# Patient Record
Sex: FEMALE | Race: WHITE | Employment: OTHER | URBAN - METROPOLITAN AREA
[De-identification: names, ages, dates, MRNs, and addresses within clinical notes are randomized per-mention and may not be internally consistent; named-entity substitution may affect disease eponyms.]

---

## 2017-01-09 PROBLEM — I73.9 PAD (PERIPHERAL ARTERY DISEASE) (HCC): Status: ACTIVE | Noted: 2017-01-09

## 2017-02-14 ENCOUNTER — TELEPHONE (OUTPATIENT)
Dept: INTERNAL MEDICINE CLINIC | Facility: CLINIC | Age: 82
End: 2017-02-14

## 2017-02-14 NOTE — TELEPHONE ENCOUNTER
Pt called for refills and when I checked with nurse when checking chart it looked like    Pt may have refills at pharmacy - pt was advised to call pharmacy to see if she had refills left, since she had not yet done that     But Pt was upset with me and hun

## 2017-02-20 ENCOUNTER — TELEPHONE (OUTPATIENT)
Dept: INTERNAL MEDICINE CLINIC | Facility: CLINIC | Age: 82
End: 2017-02-20

## 2017-02-20 DIAGNOSIS — L29.9 ITCHING: Primary | ICD-10-CM

## 2017-02-20 NOTE — TELEPHONE ENCOUNTER
Pt states she uses [facility] [employer] order. Other refills were sent to local pharm. Pt takes Vit D 2000 units. I informed pt that she can pick this up otc. Pt verbalized understanding. Pt states she is taking Hydroxyzine 25mg, 2-3 tablets BID prn.      To

## 2017-02-20 NOTE — TELEPHONE ENCOUNTER
Pt. Is requesting refills she needs new Rx's Cilostazol 100 mg, Hydroxyzine 25 mg pt. Wants it increased because she is still itching, Vitamin D, Simvastatin 10 mg, Temazepam 15 mg,   Ph. # [phone]     Pt.  Want's Rx mailed to her house (address [facility]

## 2017-02-21 RX ORDER — CILOSTAZOL 100 MG/1
100 TABLET ORAL 2 TIMES DAILY
Qty: 180 TABLET | Refills: 3 | Status: SHIPPED | OUTPATIENT
Start: 2017-02-21 | End: 2017-02-22

## 2017-02-21 RX ORDER — TEMAZEPAM 15 MG/1
15 CAPSULE ORAL NIGHTLY PRN
Refills: 0
Start: 2017-02-21

## 2017-02-21 RX ORDER — SIMVASTATIN 10 MG
10 TABLET ORAL NIGHTLY
Qty: 90 TABLET | Refills: 3 | Status: SHIPPED | OUTPATIENT
Start: 2017-02-21 | End: 2017-02-22

## 2017-02-21 RX ORDER — HYDROXYZINE HYDROCHLORIDE 25 MG/1
25 TABLET, FILM COATED ORAL 3 TIMES DAILY PRN
Qty: 30 TABLET | Refills: 3 | Status: SHIPPED | OUTPATIENT
Start: 2017-02-21 | End: 2017-02-22

## 2017-02-21 NOTE — TELEPHONE ENCOUNTER
Pt. Called back stating she received a call from her local [facility] telling her she had scripts that were ready for . She wanted to be sure we knew that her scripts should go to [facility] mail order.   She is going to call her local [facility] and find out what they

## 2017-02-22 ENCOUNTER — OFFICE VISIT (OUTPATIENT)
Dept: INTERNAL MEDICINE CLINIC | Facility: CLINIC | Age: 82
End: 2017-02-22

## 2017-02-22 VITALS
DIASTOLIC BLOOD PRESSURE: 72 MMHG | TEMPERATURE: 97 F | OXYGEN SATURATION: 93 % | SYSTOLIC BLOOD PRESSURE: 108 MMHG | BODY MASS INDEX: 22.63 KG/M2 | HEART RATE: 100 BPM | WEIGHT: 123 LBS | HEIGHT: 62 IN

## 2017-02-22 DIAGNOSIS — F51.01 PRIMARY INSOMNIA: ICD-10-CM

## 2017-02-22 DIAGNOSIS — I73.9 PERIPHERAL ARTERIAL DISEASE (HCC): ICD-10-CM

## 2017-02-22 DIAGNOSIS — E55.9 VITAMIN D DEFICIENCY: ICD-10-CM

## 2017-02-22 DIAGNOSIS — L29.9 ITCHING: Primary | ICD-10-CM

## 2017-02-22 PROCEDURE — 99213 OFFICE O/P EST LOW 20 MIN: CPT | Performed by: INTERNAL MEDICINE

## 2017-02-22 PROCEDURE — G0463 HOSPITAL OUTPT CLINIC VISIT: HCPCS | Performed by: INTERNAL MEDICINE

## 2017-02-22 RX ORDER — TEMAZEPAM 15 MG/1
15 CAPSULE ORAL NIGHTLY PRN
Qty: 30 CAPSULE | Refills: 3 | Status: ON HOLD
Start: 2017-02-22 | End: 2017-03-03

## 2017-02-22 RX ORDER — CILOSTAZOL 100 MG/1
100 TABLET ORAL 2 TIMES DAILY
Qty: 120 TABLET | Refills: 3 | Status: SHIPPED | OUTPATIENT
Start: 2017-02-22 | End: 2018-03-02

## 2017-02-22 RX ORDER — MULTIVIT-MIN/IRON/FOLIC ACID/K 18-600-40
1 CAPSULE ORAL DAILY
COMMUNITY
End: 2017-02-22

## 2017-02-22 RX ORDER — MULTIVIT-MIN/IRON/FOLIC ACID/K 18-600-40
1 CAPSULE ORAL DAILY
Qty: 90 CAPSULE | Refills: 3 | Status: SHIPPED | OUTPATIENT
Start: 2017-02-22 | End: 2018-05-09 | Stop reason: DRUGHIGH

## 2017-02-22 RX ORDER — SIMVASTATIN 10 MG
10 TABLET ORAL NIGHTLY
Qty: 90 TABLET | Refills: 3 | Status: SHIPPED | OUTPATIENT
Start: 2017-02-22 | End: 2018-02-16

## 2017-02-22 RX ORDER — HYDROXYZINE HYDROCHLORIDE 25 MG/1
25 TABLET, FILM COATED ORAL 3 TIMES DAILY PRN
Qty: 30 TABLET | Refills: 3 | Status: SHIPPED | OUTPATIENT
Start: 2017-02-22 | End: 2018-12-05

## 2017-02-22 NOTE — PROGRESS NOTES
Kristen Figueroa is a 80year old female. Patient presents with:  Medication Request  Urinary: Foul odor       HPI:   Kristen Figueroa is a 80year old female who presents for: a follow up visit    Reports feeling well.  Does report a foul odor to her urin Comment L2-S1 decompressive laminectomy with bilat foraminotomies.  Dr. Ulysees Mcardle      Family History   Problem Relation Age of Onset   • Heart Disorder Father    • Other[Other] [OTHER] Mother      colon cancer   • Cancer Mother      breast c tablet; Refill: 3    3. Vitamin D deficiency  Vitamin D supplementation encouraged. Encouraged PT  - Cholecalciferol (VITAMIN D) 2000 units Oral Cap; Take 1 capsule (2,000 Units total) by mouth daily. Dispense: 90 capsule; Refill: 3    4.  Primary insomnia

## 2017-02-23 ENCOUNTER — LAB ENCOUNTER (OUTPATIENT)
Dept: LAB | Facility: HOSPITAL | Age: 82
End: 2017-02-23
Attending: INTERNAL MEDICINE
Payer: MEDICARE

## 2017-02-23 DIAGNOSIS — R35.0 URINE FREQUENCY: Primary | ICD-10-CM

## 2017-02-23 LAB
BILIRUB UR QL: NEGATIVE
COLOR UR: YELLOW
GLUCOSE UR-MCNC: NEGATIVE MG/DL
HGB UR QL STRIP.AUTO: NEGATIVE
NITRITE UR QL STRIP.AUTO: NEGATIVE
PH UR: 5 [PH] (ref 5–8)
PROT UR-MCNC: 30 MG/DL
RBC #/AREA URNS AUTO: 17 /HPF
SP GR UR STRIP: 1.02 (ref 1–1.03)
UROBILINOGEN UR STRIP-ACNC: <2
VIT C UR-MCNC: 40 MG/DL
WBC #/AREA URNS AUTO: 339 /HPF

## 2017-02-23 PROCEDURE — 87077 CULTURE AEROBIC IDENTIFY: CPT

## 2017-02-23 PROCEDURE — 87186 SC STD MICRODIL/AGAR DIL: CPT

## 2017-02-23 PROCEDURE — 81001 URINALYSIS AUTO W/SCOPE: CPT

## 2017-02-23 PROCEDURE — 87086 URINE CULTURE/COLONY COUNT: CPT

## 2017-02-23 RX ORDER — NITROFURANTOIN 25; 75 MG/1; MG/1
100 CAPSULE ORAL 2 TIMES DAILY
Qty: 14 CAPSULE | Refills: 0 | Status: ON HOLD | OUTPATIENT
Start: 2017-02-23 | End: 2017-03-03

## 2017-02-23 RX ORDER — NITROFURANTOIN 25; 75 MG/1; MG/1
100 CAPSULE ORAL 2 TIMES DAILY
Qty: 14 CAPSULE | Refills: 0 | Status: SHIPPED | OUTPATIENT
Start: 2017-02-23 | End: 2017-02-23

## 2017-02-23 NOTE — TELEPHONE ENCOUNTER
Patient called back, states that she spoke to her son and they do want the Rx at [facility] because it is more convenient for her son to pick-up. [facility] and [facility] sent there and then called the Rx into [facility].

## 2017-02-23 NOTE — TELEPHONE ENCOUNTER
Please call patient and patient's son to notify that urine test shows infection. I would like her to take macrobid twice a day for 7 days (i have pended the Rx, please ask where they would like this sent).

## 2017-02-23 NOTE — TELEPHONE ENCOUNTER
[facility] I did speak to patients son Neo Medina and let him know that patients urine test shows infection and that  is prescribing macrobid for patient to take twice daily for 7 days.  Son asked that I send the prescription to the

## 2017-02-27 ENCOUNTER — TELEPHONE (OUTPATIENT)
Dept: INTERNAL MEDICINE CLINIC | Facility: CLINIC | Age: 82
End: 2017-02-27

## 2017-02-27 RX ORDER — CIPROFLOXACIN 250 MG/1
250 TABLET, FILM COATED ORAL 2 TIMES DAILY
Qty: 10 TABLET | Refills: 0 | Status: ON HOLD | OUTPATIENT
Start: 2017-02-27 | End: 2017-03-03

## 2017-02-27 RX ORDER — CIPROFLOXACIN 250 MG/1
250 TABLET, FILM COATED ORAL 2 TIMES DAILY
Qty: 10 TABLET | Refills: 0 | Status: SHIPPED | OUTPATIENT
Start: 2017-02-27 | End: 2017-02-27

## 2017-03-01 ENCOUNTER — APPOINTMENT (OUTPATIENT)
Dept: CT IMAGING | Facility: HOSPITAL | Age: 82
DRG: 189 | End: 2017-03-01
Attending: EMERGENCY MEDICINE
Payer: MEDICARE

## 2017-03-01 ENCOUNTER — APPOINTMENT (OUTPATIENT)
Dept: GENERAL RADIOLOGY | Facility: HOSPITAL | Age: 82
DRG: 189 | End: 2017-03-01
Attending: EMERGENCY MEDICINE
Payer: MEDICARE

## 2017-03-01 ENCOUNTER — HOSPITAL ENCOUNTER (INPATIENT)
Facility: HOSPITAL | Age: 82
LOS: 1 days | Discharge: HOME HEALTH | DRG: 189 | End: 2017-03-03
Attending: EMERGENCY MEDICINE | Admitting: INTERNAL MEDICINE
Payer: MEDICARE

## 2017-03-01 DIAGNOSIS — R06.00 DYSPNEA, UNSPECIFIED TYPE: ICD-10-CM

## 2017-03-01 DIAGNOSIS — R09.02 HYPOXIA: Primary | ICD-10-CM

## 2017-03-01 DIAGNOSIS — F51.01 PRIMARY INSOMNIA: ICD-10-CM

## 2017-03-01 LAB
ANION GAP SERPL CALC-SCNC: 10 MMOL/L (ref 0–18)
BASOPHILS # BLD: 0.1 K/UL (ref 0–0.2)
BASOPHILS NFR BLD: 1 %
BUN SERPL-MCNC: 11 MG/DL (ref 8–20)
BUN/CREAT SERPL: 10.8 (ref 10–20)
CALCIUM SERPL-MCNC: 9.4 MG/DL (ref 8.5–10.5)
CHLORIDE SERPL-SCNC: 108 MMOL/L (ref 95–110)
CO2 SERPL-SCNC: 25 MMOL/L (ref 22–32)
CREAT SERPL-MCNC: 1.02 MG/DL (ref 0.5–1.5)
D DIMER PPP FEU-MCNC: 0.84 MCG/ML (ref ?–0.82)
EOSINOPHIL # BLD: 0.6 K/UL (ref 0–0.7)
EOSINOPHIL NFR BLD: 7 %
ERYTHROCYTE [DISTWIDTH] IN BLOOD BY AUTOMATED COUNT: 13.5 % (ref 11–15)
GLUCOSE SERPL-MCNC: 122 MG/DL (ref 70–99)
HCT VFR BLD AUTO: 48.5 % (ref 35–48)
HGB BLD-MCNC: 16.1 G/DL (ref 12–16)
LYMPHOCYTES # BLD: 0.6 K/UL (ref 1–4)
LYMPHOCYTES NFR BLD: 6 %
MCH RBC QN AUTO: 31.8 PG (ref 27–32)
MCHC RBC AUTO-ENTMCNC: 33.2 G/DL (ref 32–37)
MCV RBC AUTO: 95.9 FL (ref 80–100)
MONOCYTES # BLD: 0.5 K/UL (ref 0–1)
MONOCYTES NFR BLD: 6 %
NEUTROPHILS # BLD AUTO: 7.8 K/UL (ref 1.8–7.7)
NEUTROPHILS NFR BLD: 81 %
OSMOLALITY UR CALC.SUM OF ELEC: 297 MOSM/KG (ref 275–295)
PLATELET # BLD AUTO: 137 K/UL (ref 140–400)
PMV BLD AUTO: 10.1 FL (ref 7.4–10.3)
POTASSIUM SERPL-SCNC: 3.7 MMOL/L (ref 3.3–5.1)
RBC # BLD AUTO: 5.06 M/UL (ref 3.7–5.4)
SODIUM SERPL-SCNC: 143 MMOL/L (ref 136–144)
TROPONIN I SERPL-MCNC: 0 NG/ML (ref ?–0.03)
WBC # BLD AUTO: 9.7 K/UL (ref 4–11)

## 2017-03-01 PROCEDURE — 71020 XR CHEST PA + LAT CHEST (CPT=71020): CPT

## 2017-03-01 PROCEDURE — 71260 CT THORAX DX C+: CPT

## 2017-03-02 PROBLEM — R09.02 HYPOXIA: Status: ACTIVE | Noted: 2017-03-02

## 2017-03-02 PROBLEM — R06.00 DYSPNEA, UNSPECIFIED TYPE: Status: ACTIVE | Noted: 2017-03-02

## 2017-03-02 LAB
ADENOVIRUS PCR:: NEGATIVE
B PERT DNA SPEC QL NAA+PROBE: NEGATIVE
C PNEUM DNA SPEC QL NAA+PROBE: NEGATIVE
CORONAVIRUS 229E PCR:: NEGATIVE
CORONAVIRUS HKU1 PCR:: NEGATIVE
CORONAVIRUS NL63 PCR:: NEGATIVE
CORONAVIRUS OC43 PCR:: NEGATIVE
FLUAV H1 2009 PAND RNA SPEC QL NAA+PROBE: NEGATIVE
FLUAV H1 RNA SPEC QL NAA+PROBE: NEGATIVE
FLUAV H3 RNA SPEC QL NAA+PROBE: NEGATIVE
FLUAV RNA SPEC QL NAA+PROBE: NEGATIVE
FLUBV RNA SPEC QL NAA+PROBE: NEGATIVE
METAPNEUMOVIRUS PCR:: NEGATIVE
MYCOPLASMA PNEUMONIA PCR:: NEGATIVE
PARAINFLUENZA 1 PCR:: NEGATIVE
PARAINFLUENZA 2 PCR:: NEGATIVE
PARAINFLUENZA 3 PCR:: NEGATIVE
PARAINFLUENZA 4 PCR:: NEGATIVE
POTASSIUM SERPL-SCNC: 3.7 MMOL/L (ref 3.3–5.1)
RESPIRATORY PANEL NEG:: NEGATIVE
RHINOVIRUS/ENTERO PCR:: NEGATIVE
RSV RNA SPEC QL NAA+PROBE: NEGATIVE

## 2017-03-02 PROCEDURE — 99222 1ST HOSP IP/OBS MODERATE 55: CPT | Performed by: INTERNAL MEDICINE

## 2017-03-02 RX ORDER — TEMAZEPAM 15 MG/1
15 CAPSULE ORAL NIGHTLY PRN
Status: DISCONTINUED | OUTPATIENT
Start: 2017-03-02 | End: 2017-03-03

## 2017-03-02 RX ORDER — HEPARIN SODIUM 5000 [USP'U]/ML
5000 INJECTION, SOLUTION INTRAVENOUS; SUBCUTANEOUS EVERY 12 HOURS SCHEDULED
Status: DISCONTINUED | OUTPATIENT
Start: 2017-03-02 | End: 2017-03-03

## 2017-03-02 RX ORDER — ATORVASTATIN CALCIUM 10 MG/1
5 TABLET, FILM COATED ORAL NIGHTLY
Status: DISCONTINUED | OUTPATIENT
Start: 2017-03-02 | End: 2017-03-03

## 2017-03-02 RX ORDER — ASPIRIN 81 MG/1
81 TABLET, CHEWABLE ORAL DAILY
Status: DISCONTINUED | OUTPATIENT
Start: 2017-03-02 | End: 2017-03-03

## 2017-03-02 RX ORDER — HYDROXYZINE HYDROCHLORIDE 25 MG/1
25 TABLET, FILM COATED ORAL 3 TIMES DAILY PRN
Status: DISCONTINUED | OUTPATIENT
Start: 2017-03-02 | End: 2017-03-03

## 2017-03-02 RX ORDER — SODIUM CHLORIDE 9 MG/ML
INJECTION, SOLUTION INTRAVENOUS CONTINUOUS
Status: DISCONTINUED | OUTPATIENT
Start: 2017-03-02 | End: 2017-03-02

## 2017-03-02 RX ORDER — POTASSIUM CHLORIDE 20 MEQ/1
40 TABLET, EXTENDED RELEASE ORAL ONCE
Status: COMPLETED | OUTPATIENT
Start: 2017-03-02 | End: 2017-03-02

## 2017-03-02 RX ORDER — CILOSTAZOL 100 MG/1
100 TABLET ORAL 2 TIMES DAILY
Status: DISCONTINUED | OUTPATIENT
Start: 2017-03-02 | End: 2017-03-03

## 2017-03-02 RX ORDER — IPRATROPIUM BROMIDE AND ALBUTEROL SULFATE 2.5; .5 MG/3ML; MG/3ML
3 SOLUTION RESPIRATORY (INHALATION) EVERY 6 HOURS PRN
Status: DISCONTINUED | OUTPATIENT
Start: 2017-03-02 | End: 2017-03-03

## 2017-03-02 NOTE — H&P
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  The patient is followed by Dr. Claudetta Sexton. 4.   Dyslipidemia. Continue statin 5 mg daily. 5.   Osteoporosis. Continue vitamin D daily. 6.   FEN. The patient is not to have SCDs due to her peripheral arterial disease.

## 2017-03-02 NOTE — DISCHARGE PLANNING
SAL met with the pt for initial assessment. Pt had recently been living at [facility] independent apartment in [address] [place] [address] to be near the son for a short time. She intends to return to [place] upon discharge.  She does not have a plane ticket yet, but will fly out

## 2017-03-02 NOTE — PHYSICAL THERAPY NOTE
PHYSICAL THERAPY EVALUATION - INPATIENT     Room Number: 551/551-A  Evaluation Date: 3/2/2017  Type of Evaluation: Initial  Physician Order: PT Eval and Treat    Presenting Problem: hypoxia  Reason for Therapy: Mobility Dysfunction and Discharge Planni ASSESSMENT    Lower extremity ROM is within functional limits BLE WNL    Lower extremity strength is within functional limits BLE WNL    BALANCE  Static Sitting: Good  Dynamic Sitting: Good  Static Standing: Good  Dynamic Standing: Fair +    ACTIVITY Austin Wang approved participation in physical therapy. Patient currently independent in bed mobility. Patient is able to sit to/from stand with rolling walker with SBA. Patient is able to ambulate 10ft with rolling walker with SBA. Patient able to toilet with SBA.  Pa

## 2017-03-02 NOTE — PLAN OF CARE
Patients O2 sat on room air 94% eart Rate 99 while at rest, when ambulating 94% on room air heart rate 103 and 96% on 2 L while ambulating heart rate 96.

## 2017-03-02 NOTE — ED PROVIDER NOTES
Patient Seen in: [facility] AND [facility] Emergency Department    History   Patient presents with:  Fever Sepsis (infectious)    Stated Complaint: sob, fever    HPI    51-year-old female with history of arthritis, lupus, hyperlipidemia, prior deep vein thrombos nightly as needed for Sleep.   cilostazol 100 MG Oral Tab,  Take 1 tablet (100 mg total) by mouth 2 (two) times daily. Misc.  Devices ([facility]) Does not apply Misc,  Rolling walker       Family History   Problem Relation Age of Onset   • noted.  Psychiatric: patient is oriented X 3, there is no agitation    DIFFERENTIAL DIAGNOSIS: After history and physical exam differential diagnosis was considered for pneumonia, less likely congestive heart failure or other cardiac issue        ED Course embolism. Normal caliber thoracic aorta. 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 Mild emphysema in the upper lobes.  No focal consolidation or pleural effus

## 2017-03-02 NOTE — PROGRESS NOTES
simvastatin (ZOCOR) tab 10 mg  is Non-Formulary Medication &  Auto-Substituted to Atorvastatin 5 mg daily Per P&T PROTOCOL

## 2017-03-02 NOTE — PLAN OF CARE
Patient/Family Goals    • Patient/Family Long Term Goal Progressing        Pt admitted to room 551. Pt originally from [place]. Here at [facility] w complaints of chest pain, SOB. D dimer 0.84, Chest CT negative. Medications reconciled with Dr. Iona Aase.  Heparin f

## 2017-03-02 NOTE — DISCHARGE PLANNING
YARELI received for discussion of rehab vs home health. Pt previously refused the discussion of home health or rehab. Per MD suggestion, SW placed call to the son Berenice Antonio who states that the pt's apartment at [facility] will end the lease in Mid-April.  She is abl

## 2017-03-03 VITALS
DIASTOLIC BLOOD PRESSURE: 72 MMHG | SYSTOLIC BLOOD PRESSURE: 124 MMHG | HEART RATE: 96 BPM | OXYGEN SATURATION: 95 % | WEIGHT: 135 LBS | TEMPERATURE: 98 F | RESPIRATION RATE: 18 BRPM | BODY MASS INDEX: 25 KG/M2

## 2017-03-03 LAB — POTASSIUM SERPL-SCNC: 4.2 MMOL/L (ref 3.3–5.1)

## 2017-03-03 PROCEDURE — 99239 HOSP IP/OBS DSCHRG MGMT >30: CPT | Performed by: INTERNAL MEDICINE

## 2017-03-03 RX ORDER — TEMAZEPAM 15 MG/1
15 CAPSULE ORAL NIGHTLY PRN
Qty: 30 CAPSULE | Refills: 0 | Status: SHIPPED | OUTPATIENT
Start: 2017-03-03 | End: 2017-05-30

## 2017-03-03 RX ORDER — AZITHROMYCIN 250 MG/1
TABLET, FILM COATED ORAL
Qty: 3 TABLET | Refills: 0 | Status: SHIPPED | OUTPATIENT
Start: 2017-03-03 | End: 2017-03-08

## 2017-03-03 RX ORDER — 0.9 % SODIUM CHLORIDE 0.9 %
VIAL (ML) INJECTION
Status: DISCONTINUED
Start: 2017-03-03 | End: 2017-03-03

## 2017-03-03 RX ORDER — ALBUTEROL SULFATE 90 UG/1
1 AEROSOL, METERED RESPIRATORY (INHALATION) EVERY 4 HOURS PRN
Qty: 1 INHALER | Refills: 0 | Status: SHIPPED | OUTPATIENT
Start: 2017-03-03 | End: 2017-05-30 | Stop reason: ALTCHOICE

## 2017-03-03 RX ORDER — AZITHROMYCIN 250 MG/1
500 TABLET, FILM COATED ORAL
Status: DISCONTINUED | OUTPATIENT
Start: 2017-03-03 | End: 2017-03-03

## 2017-03-03 NOTE — PROGRESS NOTES
[facility]    Progress Note    Yash Corey Patient Status:  Inpatient    1934 MRN [id number]   Location [facility] 5SW/SE Attending Madeleine Lynch DO   Hosp Day # 2 PCP Romi Quintanilla DO       SUBJECTIVE:      OBJECTIVE:  B right lung base. 4. Atelectasis or scar inferior lingula. 5. Mild centrilobular emphysema. 6. 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  7. Mild nonspecific right hilar and mediastinal lymph node enlargement-

## 2017-03-03 NOTE — PLAN OF CARE
Patient/Family Goals    • Patient/Family Long Term Goal Progressing    • Patient/Family Short Term Goal Progressing          Patient's 02 sat >94% on room air. Patient ambulated halls using walker without dyspnea on exertion.  IV fluids discontinued per pat

## 2017-03-03 NOTE — PROGRESS NOTES
Zithromax (azithromycin) 250 mg PO q24h was ordered for Adryan Edwards by Dr. Alivia Meadows. Body mass index is 24.69 kg/(m^2).   Wt Readings from Last 6 Encounters:  03/03/17 : 135 lb  02/22/17 : 123 lb  01/09/17 : 130 lb  10/28/16 : 130 lb  12/02/15 : 128 lb

## 2017-03-03 NOTE — DISCHARGE PLANNING
RN states dc order has been obtained.  Pt has been accepted at [facility] and orders with Face to Face encounter will be sent to finalize the referral. Pt's son requested a Medicar to be arranged to get the pt back to [facility] today 3/3 at 11:45am. Pt is

## 2017-03-06 ENCOUNTER — TELEPHONE (OUTPATIENT)
Dept: INTERNAL MEDICINE CLINIC | Facility: CLINIC | Age: 82
End: 2017-03-06

## 2017-03-06 ENCOUNTER — PATIENT OUTREACH (OUTPATIENT)
Dept: INTERNAL MEDICINE CLINIC | Facility: CLINIC | Age: 82
End: 2017-03-06

## 2017-03-06 DIAGNOSIS — R19.7 DIARRHEA, UNSPECIFIED TYPE: Primary | ICD-10-CM

## 2017-03-06 NOTE — PROGRESS NOTES
LMTCB message left on answering machine for pt to call us back to discuss transition of care  Initial Post Discharge Follow Up   Discharge Date: 3/3/17  Contact Date: 3/6/2017    Consent Verification:  Assessment Completed With: Patient  HIPAA Verified?   Tatiana Finn Do you ever stop taking your medicine because you feel worse after taking it? Yes    8. Which issues keep you from taking your medicine as prescribed?    as a nurse I am knowledgable     9.  Does the doctor who prescribed the medicine know about the side e

## 2017-03-06 NOTE — TELEPHONE ENCOUNTER
I called pt for her TCM follow up at which time she told me she has had terrible diarrhea since discharge. 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 Would like your suggestion on new antibiotic.  I did make her a f/u appointment yet this week as she

## 2017-03-06 NOTE — TELEPHONE ENCOUNTER
Per Dr. Jagdish Dias, if pt has not finished abx, pt can stop abx. Order C.diff. Relayed Dr. Ab Mitchell message to pt--verbalized understanding. Pt has hosp f/u appt with Dr. Jagdish Dias on Wednesday.  Pt states she cannot drive to  stool collection kit sooner and will pick

## 2017-03-08 ENCOUNTER — OFFICE VISIT (OUTPATIENT)
Dept: INTERNAL MEDICINE CLINIC | Facility: CLINIC | Age: 82
End: 2017-03-08

## 2017-03-08 VITALS
TEMPERATURE: 97 F | DIASTOLIC BLOOD PRESSURE: 78 MMHG | SYSTOLIC BLOOD PRESSURE: 126 MMHG | OXYGEN SATURATION: 92 % | HEIGHT: 62 IN | HEART RATE: 84 BPM | RESPIRATION RATE: 18 BRPM

## 2017-03-08 DIAGNOSIS — J44.9 CHRONIC OBSTRUCTIVE PULMONARY DISEASE, UNSPECIFIED COPD TYPE (HCC): Primary | ICD-10-CM

## 2017-03-08 DIAGNOSIS — N39.46 MIXED STRESS AND URGE URINARY INCONTINENCE: ICD-10-CM

## 2017-03-08 PROCEDURE — 99496 TRANSJ CARE MGMT HIGH F2F 7D: CPT | Performed by: INTERNAL MEDICINE

## 2017-03-08 NOTE — PROGRESS NOTES
HPI:    Jennifer Stoll is a 80year old female here today for hospital follow up.    She was discharged from Inpatient hospital, [facility]  to Assisted living   Admission Date: 3/1/17   Discharge Date: 3/3/17  Hospital Discharge Diagnosis: Acut without pain or cyanosis. She was discharge home with azithromycin and albuterol inhaler prn. She is advised not to allow BP measurement in the L arm. She is doing well. Still has a mild cough but denies sob or wheezing.      Also reports increased urinary her mother. She  reports that she quit smoking about 3 years ago. She has never used smokeless tobacco. She reports that she drinks alcohol. She reports that she does not use illicit drugs.      ROS:   GENERAL: weight stable, energy stable, no sweating  H Peripheral vascular disease  Continue aspirin and pletal. Continue ambulation with physical therapy. To follow up with Dr. Chava Marin.      Meds & Refills for this Visit:  No prescriptions requested or ordered in this encounter       Imaging & Consults:  OP REF

## 2017-03-21 PROBLEM — I77.1 SUBCLAVIAN ARTERY STENOSIS, LEFT (HCC): Status: ACTIVE | Noted: 2017-03-21

## 2017-03-21 PROBLEM — M48.07 SPINAL STENOSIS OF LUMBOSACRAL REGION: Status: ACTIVE | Noted: 2017-03-21

## 2017-03-30 RX ORDER — TEMAZEPAM 15 MG/1
15 CAPSULE ORAL NIGHTLY PRN
Qty: 30 CAPSULE | Refills: 2 | Status: SHIPPED
Start: 2017-03-30 | End: 2017-05-30

## 2017-04-28 ENCOUNTER — TELEPHONE (OUTPATIENT)
Dept: INTERNAL MEDICINE CLINIC | Facility: CLINIC | Age: 82
End: 2017-04-28

## 2017-04-28 NOTE — TELEPHONE ENCOUNTER
Pt requesting refill for Temazepam 15MG  Pt is out of medication  Put uses [facility] for this [facility]ill   Tasked to [employer]

## 2017-04-28 NOTE — TELEPHONE ENCOUNTER
Spoke with patient's son, he will drop off form and I will fill it out. Discussed that she should no longer be allowed to drive.

## 2017-04-28 NOTE — TELEPHONE ENCOUNTER
Per med module Temazepam Rx indicates Faxed on 3/30/17 with 2 additional refills but I cannot find an encounter/message linked to that being done. Not sure is Rx was faxed.   please advise

## 2017-04-28 NOTE — TELEPHONE ENCOUNTER
Pt requesting handicap placard application  It is very hard for her to get around with walker  Please call with any questions  Tasked to nursing

## 2017-05-30 ENCOUNTER — TELEPHONE (OUTPATIENT)
Dept: INTERNAL MEDICINE CLINIC | Facility: CLINIC | Age: 82
End: 2017-05-30

## 2017-05-30 ENCOUNTER — HOSPITAL ENCOUNTER (OUTPATIENT)
Dept: ULTRASOUND IMAGING | Facility: HOSPITAL | Age: 82
Discharge: HOME | End: 2017-05-30
Attending: INTERNAL MEDICINE | Admitting: INTERNAL MEDICINE
Payer: MEDICARE

## 2017-05-30 ENCOUNTER — OFFICE VISIT (OUTPATIENT)
Dept: INTERNAL MEDICINE CLINIC | Facility: CLINIC | Age: 82
End: 2017-05-30

## 2017-05-30 VITALS
DIASTOLIC BLOOD PRESSURE: 80 MMHG | BODY MASS INDEX: 25 KG/M2 | WEIGHT: 135 LBS | TEMPERATURE: 98 F | HEART RATE: 90 BPM | SYSTOLIC BLOOD PRESSURE: 148 MMHG | OXYGEN SATURATION: 96 %

## 2017-05-30 DIAGNOSIS — J44.9 CHRONIC OBSTRUCTIVE PULMONARY DISEASE, UNSPECIFIED COPD TYPE (HCC): ICD-10-CM

## 2017-05-30 DIAGNOSIS — M79.89 LEG SWELLING: Primary | ICD-10-CM

## 2017-05-30 DIAGNOSIS — M79.89 LEG SWELLING: ICD-10-CM

## 2017-05-30 PROCEDURE — G0463 HOSPITAL OUTPT CLINIC VISIT: HCPCS | Performed by: INTERNAL MEDICINE

## 2017-05-30 PROCEDURE — 93010 ELECTROCARDIOGRAM REPORT: CPT | Performed by: INTERNAL MEDICINE

## 2017-05-30 PROCEDURE — 93970 EXTREMITY STUDY: CPT | Performed by: INTERNAL MEDICINE

## 2017-05-30 PROCEDURE — 93005 ELECTROCARDIOGRAM TRACING: CPT | Performed by: INTERNAL MEDICINE

## 2017-05-30 PROCEDURE — 99214 OFFICE O/P EST MOD 30 MIN: CPT | Performed by: INTERNAL MEDICINE

## 2017-05-30 NOTE — TELEPHONE ENCOUNTER
Call transferred from . Patient called to report bilateral leg swelling. Patient reports L worse than R. Denies pain. States the swelling is new and she has never had this before except when she was pregnant. Patient sees Dr. Mahnaz Longoria for PAD.  With

## 2017-05-30 NOTE — PROGRESS NOTES
Elba Mack is a 80year old female who presents for     Here with her son Jessica Cedeno.     bilateral leg swelling. Pt notes her legs are swelling from knees down for 4-5 days. Later says her thighs are also swollen. Not painful. No CP or SOB.  No SOB wit Doesn't smoke currently   Cardiac: No chest pain   Gastrointestinal: says her abd wall has swelling too. No abd pain. Has hx PAD. callled her vascular doctor today who recom come here as swelling not related to PAD.   : feels she is voiding less capsule (2,000 Units total) by mouth daily. Disp: 90 capsule Rfl: 3   aspirin 81 MG Oral Tab Take 1 tablet (81 mg total) by mouth daily. Disp: 90 tablet Rfl: 3   Misc.  Devices ([facility]) Does not apply Misc Rolling walker Disp: 1 each Rfl:

## 2017-05-31 ENCOUNTER — LAB ENCOUNTER (OUTPATIENT)
Dept: LAB | Facility: HOSPITAL | Age: 82
End: 2017-05-31
Attending: INTERNAL MEDICINE
Payer: MEDICARE

## 2017-05-31 ENCOUNTER — TELEPHONE (OUTPATIENT)
Dept: INTERNAL MEDICINE CLINIC | Facility: CLINIC | Age: 82
End: 2017-05-31

## 2017-05-31 DIAGNOSIS — M79.89 LEG SWELLING: ICD-10-CM

## 2017-05-31 PROCEDURE — 80048 BASIC METABOLIC PNL TOTAL CA: CPT

## 2017-05-31 PROCEDURE — 36415 COLL VENOUS BLD VENIPUNCTURE: CPT

## 2017-05-31 PROCEDURE — 85025 COMPLETE CBC W/AUTO DIFF WBC: CPT

## 2017-05-31 PROCEDURE — 83880 ASSAY OF NATRIURETIC PEPTIDE: CPT

## 2017-05-31 NOTE — TELEPHONE ENCOUNTER
To nursing,   please tell patient's son Dory Rojas cell phone [phone]--OSE done 5/31/17–CBC BMP and BNP–results are okay. No findings to support congestive heart or kidney failure. Follow-up with Dr. Jesi Mckeon in the office this week. Thanks.   Routed to nurs

## 2017-05-31 NOTE — TELEPHONE ENCOUNTER
I called and spoke to son Lindsay January phone [phone]. I discussed venous Doppler both legs today shows no DVT. 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 (Patient has known PAD).   EKG results done today when I saw her in the office--result is

## 2017-06-01 ENCOUNTER — OFFICE VISIT (OUTPATIENT)
Dept: INTERNAL MEDICINE CLINIC | Facility: CLINIC | Age: 82
End: 2017-06-01

## 2017-06-01 VITALS
HEART RATE: 96 BPM | BODY MASS INDEX: 24.84 KG/M2 | DIASTOLIC BLOOD PRESSURE: 80 MMHG | TEMPERATURE: 98 F | OXYGEN SATURATION: 96 % | WEIGHT: 135 LBS | SYSTOLIC BLOOD PRESSURE: 148 MMHG | HEIGHT: 62 IN

## 2017-06-01 DIAGNOSIS — G47.00 INSOMNIA, UNSPECIFIED TYPE: ICD-10-CM

## 2017-06-01 DIAGNOSIS — F32.A DEPRESSION, UNSPECIFIED DEPRESSION TYPE: ICD-10-CM

## 2017-06-01 DIAGNOSIS — I73.9 PAD (PERIPHERAL ARTERY DISEASE) (HCC): ICD-10-CM

## 2017-06-01 DIAGNOSIS — R60.0 BILATERAL LOWER EXTREMITY EDEMA: Primary | ICD-10-CM

## 2017-06-01 PROCEDURE — 99213 OFFICE O/P EST LOW 20 MIN: CPT | Performed by: INTERNAL MEDICINE

## 2017-06-01 PROCEDURE — G0463 HOSPITAL OUTPT CLINIC VISIT: HCPCS | Performed by: INTERNAL MEDICINE

## 2017-06-01 NOTE — TELEPHONE ENCOUNTER
Dr. Alli Moreno' message relayed to pt's son, Viviana Oro, who verbalized understanding. Appt made for today at 2:20PM with Dr. Devonte Vega

## 2017-06-01 NOTE — PROGRESS NOTES
Jennifer Stoll is a 80year old female. Patient presents with:   Follow - Up: BLE edema x 7 days, edema better today, fatigue increasing       HPI:   Jennifer Stoll is a 80year old female who presents for: follow up on BLE edema    She was seen by  ([facility] [address] [address] 3/1/14     right leg   • Compression fracture of L1 lumbar vertebra (HCC)    • Lumbar spinal stenosis    • Visual impairment           Past Surgical History    APPENDECTOMY      HYSTERECTOMY  1970    BACK SURGERY      Comment L3-S2 at [facility] so we can get a stress test.     3) depression  Discussed trying antidepressants but patient would like to hold off at this time. She will try to find some part time volunteering or work. 4) insomnia  Try melatonin.  Trazodone was making her confused/out

## 2017-06-02 PROBLEM — I73.9 CLAUDICATION IN PERIPHERAL VASCULAR DISEASE (HCC): Status: ACTIVE | Noted: 2017-06-02

## 2017-06-08 ENCOUNTER — HOSPITAL ENCOUNTER (OUTPATIENT)
Dept: CT IMAGING | Facility: HOSPITAL | Age: 82
Discharge: HOME | End: 2017-06-08
Attending: SURGERY
Payer: MEDICARE

## 2017-06-08 DIAGNOSIS — I73.9 CLAUDICATION IN PERIPHERAL VASCULAR DISEASE (HCC): ICD-10-CM

## 2017-06-08 PROCEDURE — 82565 ASSAY OF CREATININE: CPT

## 2017-06-08 PROCEDURE — 75635 CT ANGIO ABDOMINAL ARTERIES: CPT | Performed by: SURGERY

## 2017-06-26 NOTE — PROGRESS NOTES
Called pt and discussed the results of the CT angiogram of the abd/pelvis/lower extremities. This showed no iliac disease and bilateral popliteal artery occlusions.   I mentioned to the patient that I strongly feel that her treatment should focus more on a

## 2017-07-27 ENCOUNTER — APPOINTMENT (OUTPATIENT)
Dept: LAB | Age: 82
End: 2017-07-27
Attending: INTERNAL MEDICINE
Payer: MEDICARE

## 2017-07-27 ENCOUNTER — OFFICE VISIT (OUTPATIENT)
Dept: INTERNAL MEDICINE CLINIC | Facility: CLINIC | Age: 82
End: 2017-07-27

## 2017-07-27 VITALS
WEIGHT: 125 LBS | DIASTOLIC BLOOD PRESSURE: 78 MMHG | TEMPERATURE: 98 F | OXYGEN SATURATION: 96 % | SYSTOLIC BLOOD PRESSURE: 110 MMHG | BODY MASS INDEX: 23 KG/M2 | HEIGHT: 62 IN | HEART RATE: 98 BPM

## 2017-07-27 DIAGNOSIS — R53.83 FATIGUE, UNSPECIFIED TYPE: ICD-10-CM

## 2017-07-27 DIAGNOSIS — I73.9 PAD (PERIPHERAL ARTERY DISEASE) (HCC): Primary | ICD-10-CM

## 2017-07-27 DIAGNOSIS — M51.9 LUMBAR DISC DISEASE: ICD-10-CM

## 2017-07-27 LAB — TSH SERPL-ACNC: 1.3 UIU/ML (ref 0.45–5.33)

## 2017-07-27 PROCEDURE — G0463 HOSPITAL OUTPT CLINIC VISIT: HCPCS | Performed by: INTERNAL MEDICINE

## 2017-07-27 PROCEDURE — 36415 COLL VENOUS BLD VENIPUNCTURE: CPT

## 2017-07-27 PROCEDURE — 99213 OFFICE O/P EST LOW 20 MIN: CPT | Performed by: INTERNAL MEDICINE

## 2017-07-27 PROCEDURE — 84443 ASSAY THYROID STIM HORMONE: CPT

## 2017-07-27 RX ORDER — NEOMYCIN SULFATE, POLYMYXIN B SULFATE, HYDROCORTISONE 3.5; 10000; 1 MG/ML; [USP'U]/ML; MG/ML
SOLUTION/ DROPS AURICULAR (OTIC)
Refills: 1 | COMMUNITY
Start: 2017-06-05 | End: 2018-05-09 | Stop reason: ALTCHOICE

## 2017-07-27 NOTE — PROGRESS NOTES
Arline Ram is a 80year old female. Patient presents with:  Checkup: Would like referral for PT pain in BLE       HPI:   Arline Rma is a 80year old female who presents for: follow up claudication    Long history of back pain and BLE claudicat [facility])    • DVT (deep venous thrombosis) ([facility] [address] 3/1/14    right leg   • Hyperlipidemia    • Lumbar spinal stenosis    • Lupus (HCC)    • Macular degeneration    • Osteoporosis    • Osteoporosis screening 03-   • Visual impairment       Past [facility] PT, however patient refused services intermittently.  We will try out patient PT at [facility] which is close distance for her and see if she improves; if not, we can consider second opinions with vascular and orthopedics.   - PHYSICAL THERAPY EXTERNA

## 2017-07-28 ENCOUNTER — TELEPHONE (OUTPATIENT)
Dept: INTERNAL MEDICINE CLINIC | Facility: CLINIC | Age: 82
End: 2017-07-28

## 2017-07-28 NOTE — TELEPHONE ENCOUNTER
Left message on patients voicemail stating thyroid test was normal. Patient to call back office for any further questions.

## 2017-08-02 ENCOUNTER — TELEPHONE (OUTPATIENT)
Dept: INTERNAL MEDICINE CLINIC | Facility: CLINIC | Age: 82
End: 2017-08-02

## 2017-08-02 NOTE — TELEPHONE ENCOUNTER
Pt is calling in regards to her physical therapy order     It has the wrong procedure code on it     plz call pt and discuss  [phone]

## 2017-08-02 NOTE — TELEPHONE ENCOUNTER
Please advise - called patient who states that on her order for Physical Therapy the procedure code is wrong - it only should have 5 numbers - to DR. Femi Pastor

## 2017-08-03 NOTE — TELEPHONE ENCOUNTER
Advised patient that the # she is looking at is not a CPT code. Patient states she was trying to call medicare to get it approved. Advised patient to kishan with [facility] and they can check with Medicare to see if PT is covered.

## 2017-10-20 ENCOUNTER — TELEPHONE (OUTPATIENT)
Dept: INTERNAL MEDICINE CLINIC | Facility: CLINIC | Age: 82
End: 2017-10-20

## 2017-10-20 NOTE — TELEPHONE ENCOUNTER
Spoke with patient's son and he states that patient has not been sleeping well for a while. She tends to sleep more during the day because of this. Son feels that sleep deprivation is causing confusion. Son also reports dizziness.  He says patient does not

## 2017-10-20 NOTE — TELEPHONE ENCOUNTER
Scheduled appt for pt to be seen on Monday/Oct 23 with Dr William Castillo  Pt has been unable to sleep/also experiencing some dizzyness      Pt's son, Pasquale Wild, requesting call back from nurse today [phone]

## 2017-10-20 NOTE — TELEPHONE ENCOUNTER
Relayed MD's message to son---verbalized understanding  Son states pt has appt with Urologist later next week. Son reiterated Jami's message below regarding dizziness, weakness, difficulty walking. Pt eats egg beaters and broth on a daily basis.  Son Melissa Francis

## 2017-10-20 NOTE — TELEPHONE ENCOUNTER
Would like her to be evaluated; dizziness, confusion could be lack of sleep but would want to make sure no infection (urinary, respiratory). Make sure BPs are ok.

## 2017-10-23 ENCOUNTER — LAB ENCOUNTER (OUTPATIENT)
Dept: LAB | Age: 82
End: 2017-10-23
Attending: INTERNAL MEDICINE
Payer: MEDICARE

## 2017-10-23 DIAGNOSIS — G62.9 PERIPHERAL POLYNEUROPATHY: ICD-10-CM

## 2017-10-23 DIAGNOSIS — R53.83 FATIGUE, UNSPECIFIED TYPE: ICD-10-CM

## 2017-10-23 DIAGNOSIS — D64.9 ANEMIA, UNSPECIFIED TYPE: ICD-10-CM

## 2017-10-23 PROCEDURE — 82607 VITAMIN B-12: CPT

## 2017-10-23 PROCEDURE — 85025 COMPLETE CBC W/AUTO DIFF WBC: CPT

## 2017-10-23 PROCEDURE — 84466 ASSAY OF TRANSFERRIN: CPT

## 2017-10-23 PROCEDURE — 36415 COLL VENOUS BLD VENIPUNCTURE: CPT

## 2017-10-23 PROCEDURE — 83540 ASSAY OF IRON: CPT

## 2017-10-24 ENCOUNTER — TELEPHONE (OUTPATIENT)
Dept: INTERNAL MEDICINE CLINIC | Facility: CLINIC | Age: 82
End: 2017-10-24

## 2017-10-24 NOTE — PROGRESS NOTES
Monica Leos is a 80year old female. Patient presents with:  Dizziness: on and off dizziness for past few weeks. Not sleeping well.       HPI:   Monica Leos is a 80year old female who presents for: several issues    1) reports feeling \"foggy\" units Oral Cap Take 1 capsule (2,000 Units total) by mouth daily. Disp: 90 capsule Rfl: 3   aspirin 81 MG Oral Tab Take 1 tablet (81 mg total) by mouth daily.  Disp: 90 tablet Rfl: 3      Past Medical History:   Diagnosis Date   • Arthritis    • Compression ttp of both heels. ASSESSMENT AND PLAN:     1. Anxiety  Start elavil 10mg nightly. Call with an update in 2 weeks. - Amitriptyline HCl 10 MG Oral Tab; Take 1 tablet (10 mg total) by mouth nightly. Dispense: 30 tablet; Refill: 2    2.  Peripheral po

## 2017-10-24 NOTE — TELEPHONE ENCOUNTER
Please notify patient's son that her blood tests were normal. Also that I sent in a prescription for a gel that she can use on both her heels to help with the heel pain.

## 2017-11-07 ENCOUNTER — TELEPHONE (OUTPATIENT)
Dept: INTERNAL MEDICINE CLINIC | Facility: CLINIC | Age: 82
End: 2017-11-07

## 2017-11-07 NOTE — TELEPHONE ENCOUNTER
Spoke with patient's son, Sagar Montiel. He reports patient has been in PT x 2-3 mos, and her mobility has declined in that time. Reports he brought patient to his [employer] for a senior event today and had to put her in a WC d/t leg weakness.  Reports her legs \

## 2017-11-07 NOTE — TELEPHONE ENCOUNTER
Pt's son Fitz Nix called  Would like to speak with on call doctor today regarding his mom's walking which has gotten worse    Please call Fitz Nix at [phone]

## 2017-11-08 ENCOUNTER — TELEPHONE (OUTPATIENT)
Dept: INTERNAL MEDICINE CLINIC | Facility: CLINIC | Age: 82
End: 2017-11-08

## 2017-11-08 ENCOUNTER — OFFICE VISIT (OUTPATIENT)
Dept: INTERNAL MEDICINE CLINIC | Facility: CLINIC | Age: 82
End: 2017-11-08

## 2017-11-08 VITALS — SYSTOLIC BLOOD PRESSURE: 122 MMHG | DIASTOLIC BLOOD PRESSURE: 70 MMHG | HEART RATE: 72 BPM | TEMPERATURE: 98 F

## 2017-11-08 DIAGNOSIS — N28.9 KIDNEY LESION, NATIVE, LEFT: Primary | ICD-10-CM

## 2017-11-08 PROCEDURE — 99213 OFFICE O/P EST LOW 20 MIN: CPT | Performed by: INTERNAL MEDICINE

## 2017-11-08 PROCEDURE — G0463 HOSPITAL OUTPT CLINIC VISIT: HCPCS | Performed by: INTERNAL MEDICINE

## 2017-11-08 RX ORDER — METHYLPREDNISOLONE 4 MG/1
TABLET ORAL
Qty: 1 KIT | Refills: 0 | Status: SHIPPED | OUTPATIENT
Start: 2017-11-08 | End: 2017-12-01 | Stop reason: ALTCHOICE

## 2017-11-08 RX ORDER — AMITRIPTYLINE HYDROCHLORIDE 10 MG/1
10 TABLET, FILM COATED ORAL NIGHTLY
Qty: 30 TABLET | Refills: 0 | Status: SHIPPED | OUTPATIENT
Start: 2017-11-08 | End: 2018-02-02

## 2017-11-08 RX ORDER — PREDNISONE 20 MG/1
40 TABLET ORAL DAILY
Qty: 10 TABLET | Refills: 0 | Status: SHIPPED | OUTPATIENT
Start: 2017-11-08 | End: 2017-11-08

## 2017-11-08 NOTE — TELEPHONE ENCOUNTER
Dr. Christopher Duffy,    Would you be able to get this patient in soon for possible EMG? She has chronic back pain/arthritis, also has PAD, but came in with about 1 week of painless weakness in the L quadriceps.  I gave her medrol dose pack, and she is already doing

## 2017-11-08 NOTE — TELEPHONE ENCOUNTER
[phone]  Need clarification on 2 Rx sent in today for predisone and medrol.  Pt was just seen today  To clinical

## 2017-11-08 NOTE — TELEPHONE ENCOUNTER
As FYI - called pharmacy who states they got 2 RX for medrol dose pack and Prednisone 20 mg - saw that Prednisone so told pharmacist that it was cancelled -verbalized understanding  is this correct? To DR. Stef Badillo

## 2017-11-08 NOTE — PROGRESS NOTES
Merline Daughters is a 80year old female. Patient presents with:  Checkup: leg weakness       HPI:   Merline Daughters is a 80year old female who presents for: leg weakness    Her son noticed weakness starting 10/29 at [employer] where patient required a whee total) by mouth daily. Disp: 90 tablet Rfl: 3   Amitriptyline HCl 10 MG Oral Tab Take 1 tablet (10 mg total) by mouth nightly.  Disp: 30 tablet Rfl: 2      Past Medical History:   Diagnosis Date   • Arthritis    • Compression fracture of L1 lumbar vertebra RLE. Unsteadiness, and is grabbing for the wall. Neg romberg's sign. Neg pronator drift. Normal rapid hand and heel to shin bilaterally. ASSESSMENT AND PLAN:     1.  Quadriceps weakness  Patient does have appt with Dr. Ely Salcedo in December; he was contact

## 2017-11-29 NOTE — TELEPHONE ENCOUNTER
Called patient, could not leave message. I did speak to her son Rajendra-need to proceed with seeing neurology.

## 2017-11-29 NOTE — TELEPHONE ENCOUNTER
Pt is asking if she can speak to Dr. William Castillo in regards to what doctor spoke to her about and just wants to go over some things with dr. Chantelle Aguero call pt at

## 2017-12-01 ENCOUNTER — OFFICE VISIT (OUTPATIENT)
Dept: NEUROLOGY | Facility: CLINIC | Age: 82
End: 2017-12-01

## 2017-12-01 ENCOUNTER — TELEPHONE (OUTPATIENT)
Dept: NEUROLOGY | Facility: CLINIC | Age: 82
End: 2017-12-01

## 2017-12-01 VITALS
BODY MASS INDEX: 23 KG/M2 | HEART RATE: 84 BPM | RESPIRATION RATE: 16 BRPM | SYSTOLIC BLOOD PRESSURE: 116 MMHG | HEIGHT: 62 IN | WEIGHT: 125 LBS | DIASTOLIC BLOOD PRESSURE: 80 MMHG

## 2017-12-01 DIAGNOSIS — R41.89 COGNITIVE IMPAIRMENT: ICD-10-CM

## 2017-12-01 DIAGNOSIS — R29.898 LEFT LEG WEAKNESS: Primary | ICD-10-CM

## 2017-12-01 PROCEDURE — 99214 OFFICE O/P EST MOD 30 MIN: CPT | Performed by: OTHER

## 2017-12-01 NOTE — TELEPHONE ENCOUNTER
L/m advisingPt.  insurance was verified and MRI brain wo is a covered benefit and does not require authorization. Can proceed with scheduling appt.

## 2017-12-03 ENCOUNTER — APPOINTMENT (OUTPATIENT)
Dept: GENERAL RADIOLOGY | Facility: HOSPITAL | Age: 82
End: 2017-12-03
Attending: EMERGENCY MEDICINE
Payer: MEDICARE

## 2017-12-03 ENCOUNTER — HOSPITAL ENCOUNTER (EMERGENCY)
Facility: HOSPITAL | Age: 82
Discharge: HOME | End: 2017-12-03
Attending: EMERGENCY MEDICINE
Payer: MEDICARE

## 2017-12-03 VITALS
OXYGEN SATURATION: 96 % | TEMPERATURE: 98 F | HEART RATE: 85 BPM | WEIGHT: 125 LBS | BODY MASS INDEX: 23 KG/M2 | SYSTOLIC BLOOD PRESSURE: 142 MMHG | RESPIRATION RATE: 18 BRPM | DIASTOLIC BLOOD PRESSURE: 76 MMHG

## 2017-12-03 DIAGNOSIS — S90.31XA CONTUSION OF RIGHT FOOT, INITIAL ENCOUNTER: Primary | ICD-10-CM

## 2017-12-03 PROCEDURE — 99283 EMERGENCY DEPT VISIT LOW MDM: CPT

## 2017-12-03 PROCEDURE — 73630 X-RAY EXAM OF FOOT: CPT | Performed by: EMERGENCY MEDICINE

## 2017-12-03 RX ORDER — IBUPROFEN 600 MG/1
600 TABLET ORAL ONCE
Status: DISCONTINUED | OUTPATIENT
Start: 2017-12-03 | End: 2017-12-03

## 2017-12-03 RX ORDER — HYDROCODONE BITARTRATE AND ACETAMINOPHEN 5; 325 MG/1; MG/1
1 TABLET ORAL ONCE
Status: COMPLETED | OUTPATIENT
Start: 2017-12-03 | End: 2017-12-03

## 2017-12-03 RX ORDER — IBUPROFEN 400 MG/1
400 TABLET ORAL ONCE
Status: COMPLETED | OUTPATIENT
Start: 2017-12-03 | End: 2017-12-03

## 2017-12-03 NOTE — ED NOTES
Patient states she dropped something on her right foot but does not remember what, this happened yesterday. Today she is unable to bear weight on it. States pain is a 10/10 and has not taken anything for pain today.

## 2017-12-03 NOTE — ED INITIAL ASSESSMENT (HPI)
Triage:  Patient dropped something heavy on her R foot yesterday. Now having pain. States she is concerned she broke her foot.

## 2017-12-03 NOTE — PROGRESS NOTES
Neurology Outpatient Consult Note    Jeferson Barba : 1934   Referring Physician: Dr. Bri Herr  HPI:     Jeferson Barba is a 80year old female who is being seen in neurologic evaluation.     Patient is being seen in evaluation for several co daily. Disp:  Rfl:    Amitriptyline HCl 10 MG Oral Tab Take 1 tablet (10 mg total) by mouth nightly. Disp: 30 tablet Rfl: 0   Oxybutynin Chloride ER 5 MG Oral Tablet 24 Hr Take 5 mg by mouth daily.    Disp:  Rfl:    Diclofenac Sodium 1 % Transdermal Gel Larisa cancer   • Cancer Mother      breast cancer   • Colon Cancer Other      Aunt had colon cancer   • Cancer Sister      breast   • Colon Cancer Sister 68      Social History:  Social History    Marital status:               Spouse name: legs  Reflexes: DTRs 2+ in bilateral biceps, brachioradialis, 1+ in patella  Coordination / gait: no finger-nose-finger dysmetria, markedly impaired gait with stride length and decreased foot clearance; patient walks with a walker    IMAGING / STUDIES:  re

## 2017-12-04 NOTE — ED PROVIDER NOTES
Patient Seen in: [facility] Emergency Department    History   Patient presents with: Foot Pain      HPI    Patient presents after dropping something on to her right foot yesterday, she thinks it was a can of Coke.   Now presents due to pain and sw Yes    Comment:Soda        ROS  Pertinent Positives: Arthralgia  All other organ systems are reviewed and are negative. Constitutional and vital signs reviewed.       Social History and Family History elements reviewed from today, pertinent positives to contusion, foot fracture    Medical Record Review: I personally reviewed available prior medical records for any recent pertinent discharge summaries, testing, and procedures and reviewed those reports. Complicating Factors:  The patient already has has

## 2018-01-17 ENCOUNTER — HOSPITAL ENCOUNTER (OUTPATIENT)
Dept: MRI IMAGING | Facility: HOSPITAL | Age: 83
Discharge: HOME | End: 2018-01-17
Attending: Other
Payer: MEDICARE

## 2018-01-17 ENCOUNTER — HOSPITAL ENCOUNTER (OUTPATIENT)
Dept: ULTRASOUND IMAGING | Facility: HOSPITAL | Age: 83
Discharge: HOME | End: 2018-01-17
Attending: INTERNAL MEDICINE
Payer: MEDICARE

## 2018-01-17 ENCOUNTER — TELEPHONE (OUTPATIENT)
Dept: NEUROLOGY | Facility: CLINIC | Age: 83
End: 2018-01-17

## 2018-01-17 DIAGNOSIS — R41.89 COGNITIVE IMPAIRMENT: ICD-10-CM

## 2018-01-17 DIAGNOSIS — N28.9 KIDNEY LESION, NATIVE, LEFT: ICD-10-CM

## 2018-01-17 DIAGNOSIS — N28.89 LEFT RENAL MASS: Primary | ICD-10-CM

## 2018-01-17 PROCEDURE — 70551 MRI BRAIN STEM W/O DYE: CPT | Performed by: OTHER

## 2018-01-17 PROCEDURE — 76770 US EXAM ABDO BACK WALL COMP: CPT | Performed by: INTERNAL MEDICINE

## 2018-01-19 NOTE — TELEPHONE ENCOUNTER
Spoke with patient's son regarding MRI brain and US kidney results. He will bring patient to office to discuss these results.

## 2018-01-31 ENCOUNTER — HOSPITAL ENCOUNTER (OUTPATIENT)
Dept: MRI IMAGING | Facility: HOSPITAL | Age: 83
Discharge: HOME | End: 2018-01-31
Attending: INTERNAL MEDICINE
Payer: MEDICARE

## 2018-01-31 DIAGNOSIS — N28.89 LEFT RENAL MASS: ICD-10-CM

## 2018-01-31 LAB — CREAT BLD-MCNC: 1 MG/DL (ref 0.5–1.5)

## 2018-01-31 PROCEDURE — 74183 MRI ABD W/O CNTR FLWD CNTR: CPT | Performed by: INTERNAL MEDICINE

## 2018-01-31 PROCEDURE — A9575 INJ GADOTERATE MEGLUMI 0.1ML: HCPCS | Performed by: INTERNAL MEDICINE

## 2018-01-31 PROCEDURE — 82565 ASSAY OF CREATININE: CPT

## 2018-02-02 ENCOUNTER — TELEPHONE (OUTPATIENT)
Dept: INTERNAL MEDICINE CLINIC | Facility: CLINIC | Age: 83
End: 2018-02-02

## 2018-02-02 ENCOUNTER — OFFICE VISIT (OUTPATIENT)
Dept: INTERNAL MEDICINE CLINIC | Facility: CLINIC | Age: 83
End: 2018-02-02

## 2018-02-02 VITALS
SYSTOLIC BLOOD PRESSURE: 130 MMHG | OXYGEN SATURATION: 96 % | DIASTOLIC BLOOD PRESSURE: 80 MMHG | WEIGHT: 138 LBS | BODY MASS INDEX: 25 KG/M2 | HEART RATE: 104 BPM | TEMPERATURE: 98 F

## 2018-02-02 DIAGNOSIS — N28.9 RENAL LESION: Primary | ICD-10-CM

## 2018-02-02 PROCEDURE — G0463 HOSPITAL OUTPT CLINIC VISIT: HCPCS | Performed by: INTERNAL MEDICINE

## 2018-02-02 PROCEDURE — 99214 OFFICE O/P EST MOD 30 MIN: CPT | Performed by: INTERNAL MEDICINE

## 2018-02-02 NOTE — PROGRESS NOTES
Noni Orta is a 80year old female. Patient presents with:  Results: Pt is here to discuss MRI results      HPI:   Noni Orta is a 80year old female who presents for: follow up MRI results.      Ms. Danny Groves had a renal US showing anechoic cyst Prescriptions:  Apoaequorin (PREVAGEN OR) Take 1 tablet by mouth daily. Disp:  Rfl:    BIOTIN OR Take 1 tablet by mouth daily. Disp:  Rfl:    Amitriptyline HCl 10 MG Oral Tab Take 1 tablet (10 mg total) by mouth nightly.  Disp: 30 tablet Rfl: 0   Oxybutynin Josr Royal Mother      colon cancer   • Cancer Mother      breast cancer   • Colon Cancer Other      Aunt had colon cancer   • Cancer Sister      breast   • Colon Cancer Sister 68      Social History:   Smoking status: Former Smoker

## 2018-02-02 NOTE — TELEPHONE ENCOUNTER
Patient was told to schedule with [facility]. They are asking for paperwork showing the reason to be faxed to:  [phone]. Patient has appt.  2/15 with Dr. Destinee Rivera

## 2018-02-02 NOTE — TELEPHONE ENCOUNTER
Please advise on what to fax to  18 [facility] Cardiologist thanks - to DR. Ama Hendricks

## 2018-02-05 ENCOUNTER — TELEPHONE (OUTPATIENT)
Dept: INTERNAL MEDICINE CLINIC | Facility: CLINIC | Age: 83
End: 2018-02-05

## 2018-02-05 NOTE — TELEPHONE ENCOUNTER
Pt's son said that that Dr. Juana Armendariz will not be able to see pt within 2 weeks.       Tasked to Nursing

## 2018-02-05 NOTE — TELEPHONE ENCOUNTER
Please notify patient's son that I spoke with Dr. Conner [place] office; he should expect to receive a phone call from them to schedule today or tomorrow. Please call on Wednesday if he still has not received a response.

## 2018-02-06 ENCOUNTER — TELEPHONE (OUTPATIENT)
Dept: INTERNAL MEDICINE CLINIC | Facility: CLINIC | Age: 83
End: 2018-02-06

## 2018-02-06 RX ORDER — ALENDRONATE SODIUM 70 MG/1
70 TABLET ORAL WEEKLY
Qty: 12 TABLET | Refills: 3 | Status: SHIPPED | OUTPATIENT
Start: 2018-02-06 | End: 2018-05-09

## 2018-02-06 RX ORDER — AMITRIPTYLINE HYDROCHLORIDE 10 MG/1
10 TABLET, FILM COATED ORAL NIGHTLY
Qty: 30 TABLET | Refills: 3 | Status: SHIPPED | OUTPATIENT
Start: 2018-02-06 | End: 2018-02-13

## 2018-02-06 NOTE — TELEPHONE ENCOUNTER
Please notify patient and her son Bettina Han that we completed 1 year off of fosamax and with her osteoporosis, I would like her to resume fosamax. I sent in a new prescription.      Also ask if he was able to get an appt with Dr. Chapincito Ramos or one of her colle

## 2018-02-07 NOTE — TELEPHONE ENCOUNTER
As FYI to DR. RAUSCH - called patients son and relayed DR. RAUSCH message - verbalized understanding . Patient has corey 2/8 with one of DR. Cage Hinton colleagues

## 2018-02-08 ENCOUNTER — LAB ENCOUNTER (OUTPATIENT)
Dept: LAB | Facility: HOSPITAL | Age: 83
End: 2018-02-08
Attending: UROLOGY
Payer: MEDICARE

## 2018-02-08 ENCOUNTER — HOSPITAL ENCOUNTER (OUTPATIENT)
Dept: GENERAL RADIOLOGY | Facility: HOSPITAL | Age: 83
Discharge: HOME | End: 2018-02-08
Attending: UROLOGY
Payer: MEDICARE

## 2018-02-08 DIAGNOSIS — N28.89 URETERAL FISTULA: ICD-10-CM

## 2018-02-08 DIAGNOSIS — N28.89 URETERAL FISTULA: Primary | ICD-10-CM

## 2018-02-08 LAB
ALBUMIN SERPL BCP-MCNC: 4 G/DL (ref 3.5–4.8)
ALBUMIN/GLOB SERPL: 1.5 {RATIO} (ref 1–2)
ALP SERPL-CCNC: 50 U/L (ref 32–100)
ALT SERPL-CCNC: 29 U/L (ref 14–54)
ANION GAP SERPL CALC-SCNC: 10 MMOL/L (ref 0–18)
AST SERPL-CCNC: 30 U/L (ref 15–41)
BASOPHILS # BLD: 0.1 K/UL (ref 0–0.2)
BASOPHILS NFR BLD: 1 %
BILIRUB SERPL-MCNC: 0.6 MG/DL (ref 0.3–1.2)
BUN SERPL-MCNC: 22 MG/DL (ref 8–20)
BUN/CREAT SERPL: 19.1 (ref 10–20)
CALCIUM SERPL-MCNC: 10.2 MG/DL (ref 8.5–10.5)
CHLORIDE SERPL-SCNC: 103 MMOL/L (ref 95–110)
CO2 SERPL-SCNC: 27 MMOL/L (ref 22–32)
CREAT SERPL-MCNC: 1.15 MG/DL (ref 0.5–1.5)
EOSINOPHIL # BLD: 0.2 K/UL (ref 0–0.7)
EOSINOPHIL NFR BLD: 3 %
ERYTHROCYTE [DISTWIDTH] IN BLOOD BY AUTOMATED COUNT: 13.8 % (ref 11–15)
GLOBULIN PLAS-MCNC: 2.7 G/DL (ref 2.5–3.7)
GLUCOSE SERPL-MCNC: 98 MG/DL (ref 70–99)
HCT VFR BLD AUTO: 45.1 % (ref 35–48)
HGB BLD-MCNC: 15.2 G/DL (ref 12–16)
LYMPHOCYTES # BLD: 1.4 K/UL (ref 1–4)
LYMPHOCYTES NFR BLD: 20 %
MCH RBC QN AUTO: 32.5 PG (ref 27–32)
MCHC RBC AUTO-ENTMCNC: 33.7 G/DL (ref 32–37)
MCV RBC AUTO: 96.5 FL (ref 80–100)
MONOCYTES # BLD: 0.5 K/UL (ref 0–1)
MONOCYTES NFR BLD: 7 %
NEUTROPHILS # BLD AUTO: 4.8 K/UL (ref 1.8–7.7)
NEUTROPHILS NFR BLD: 68 %
OSMOLALITY UR CALC.SUM OF ELEC: 293 MOSM/KG (ref 275–295)
PATIENT FASTING: YES
PLATELET # BLD AUTO: 156 K/UL (ref 140–400)
PMV BLD AUTO: 9.3 FL (ref 7.4–10.3)
POTASSIUM SERPL-SCNC: 4.7 MMOL/L (ref 3.3–5.1)
PROT SERPL-MCNC: 6.7 G/DL (ref 5.9–8.4)
RBC # BLD AUTO: 4.67 M/UL (ref 3.7–5.4)
SODIUM SERPL-SCNC: 140 MMOL/L (ref 136–144)
WBC # BLD AUTO: 7 K/UL (ref 4–11)

## 2018-02-08 PROCEDURE — 36415 COLL VENOUS BLD VENIPUNCTURE: CPT

## 2018-02-08 PROCEDURE — 85025 COMPLETE CBC W/AUTO DIFF WBC: CPT

## 2018-02-08 PROCEDURE — 71046 X-RAY EXAM CHEST 2 VIEWS: CPT | Performed by: UROLOGY

## 2018-02-08 PROCEDURE — 80053 COMPREHEN METABOLIC PANEL: CPT

## 2018-02-13 RX ORDER — AMITRIPTYLINE HYDROCHLORIDE 10 MG/1
10 TABLET, FILM COATED ORAL NIGHTLY
Qty: 90 TABLET | Refills: 1 | Status: SHIPPED | OUTPATIENT
Start: 2018-02-13 | End: 2018-02-14

## 2018-02-14 ENCOUNTER — TELEPHONE (OUTPATIENT)
Dept: INTERNAL MEDICINE CLINIC | Facility: CLINIC | Age: 83
End: 2018-02-14

## 2018-02-14 DIAGNOSIS — E78.5 DYSLIPIDEMIA: Primary | ICD-10-CM

## 2018-02-14 RX ORDER — AMITRIPTYLINE HYDROCHLORIDE 10 MG/1
10 TABLET, FILM COATED ORAL NIGHTLY
Qty: 7 TABLET | Refills: 0 | Status: SHIPPED | OUTPATIENT
Start: 2018-02-14 | End: 2018-05-09

## 2018-02-14 RX ORDER — AMITRIPTYLINE HYDROCHLORIDE 10 MG/1
10 TABLET, FILM COATED ORAL NIGHTLY
Qty: 90 TABLET | Refills: 1 | Status: SHIPPED | OUTPATIENT
Start: 2018-02-14 | End: 2018-02-14

## 2018-02-14 NOTE — TELEPHONE ENCOUNTER
Pt requesting refill for:  Amitriptyline HCL 10MG, if qty 90 w/3 refills [facility]  if only 30 day can be filled pt [facility] [place]  This has been helping patient   Pt is low on medication  Tasked to [employer]

## 2018-02-14 NOTE — TELEPHONE ENCOUNTER
Please notify patient that I placed cholesterol order-she needs to fast for about 10 hours before doing this test.

## 2018-02-14 NOTE — TELEPHONE ENCOUNTER
Please call pt with most recent cholesterol readings from last two labs  She likes to modify diet based on results  Tasked to nursing

## 2018-02-14 NOTE — TELEPHONE ENCOUNTER
Called patient. Notified her that Dr William Castillo had refilled this medication for her yesterday to [facility] in [place] for 90 tablets with 1 refill. Patient asked me to send it [facility].  I notified her that I would do that and cancel the prescripti

## 2018-02-14 NOTE — TELEPHONE ENCOUNTER
Pt looking for her last 2 chol results    Pt last lipid level was 11-18 2016--results given   Was surprised that it was that long ago and would like to have a chol order so she can get one done this week

## 2018-02-15 ENCOUNTER — PRIOR ORIGINAL RECORDS (OUTPATIENT)
Dept: OTHER | Age: 83
End: 2018-02-15

## 2018-02-15 ENCOUNTER — APPOINTMENT (OUTPATIENT)
Dept: LAB | Age: 83
End: 2018-02-15
Attending: INTERNAL MEDICINE
Payer: MEDICARE

## 2018-02-15 ENCOUNTER — TELEPHONE (OUTPATIENT)
Dept: INTERNAL MEDICINE CLINIC | Facility: CLINIC | Age: 83
End: 2018-02-15

## 2018-02-15 DIAGNOSIS — I73.9 PERIPHERAL ARTERIAL DISEASE (HCC): ICD-10-CM

## 2018-02-15 DIAGNOSIS — I73.9 CLAUDICATION IN PERIPHERAL VASCULAR DISEASE (HCC): ICD-10-CM

## 2018-02-15 DIAGNOSIS — E78.5 DYSLIPIDEMIA: ICD-10-CM

## 2018-02-15 LAB
BUN SERPL-MCNC: 20 MG/DL (ref 8–20)
CHOLEST SERPL-MCNC: 235 MG/DL (ref 110–200)
CREAT SERPL-MCNC: 1.01 MG/DL (ref 0.5–1.5)
HDLC SERPL-MCNC: 101 MG/DL
LDLC SERPL CALC-MCNC: 112 MG/DL (ref 0–99)
NONHDLC SERPL-MCNC: 134 MG/DL
TRIGL SERPL-MCNC: 108 MG/DL (ref 1–149)

## 2018-02-15 PROCEDURE — 82565 ASSAY OF CREATININE: CPT

## 2018-02-15 PROCEDURE — 80061 LIPID PANEL: CPT

## 2018-02-15 PROCEDURE — 36415 COLL VENOUS BLD VENIPUNCTURE: CPT

## 2018-02-15 PROCEDURE — 84520 ASSAY OF UREA NITROGEN: CPT

## 2018-02-15 NOTE — TELEPHONE ENCOUNTER
Pt. Is calling to see why she received a Rx for Alendronate through her mail order pt. States she hasn't taken that medication in two years ph.  # [phone]   Routed to clinical

## 2018-02-15 NOTE — TELEPHONE ENCOUNTER
LMTCB    See 2/6/18 encounter. Dr. Jv Lafleur sent it in because she wanted patient to resume medication d/t osteoporosis.

## 2018-02-16 RX ORDER — SIMVASTATIN 20 MG
20 TABLET ORAL NIGHTLY
Qty: 90 TABLET | Refills: 3 | Status: SHIPPED | OUTPATIENT
Start: 2018-02-16 | End: 2018-12-05

## 2018-02-16 NOTE — TELEPHONE ENCOUNTER
Spoke with patient's son Caden Anderson about cholesterol. HDL is great, LDL up from 101 to 112. Will increase zocor to 20mg daily. Sent rx for 1 year supply. He will make sure mom is taking fosamax weekly.

## 2018-02-21 RX ORDER — SIMVASTATIN 10 MG
TABLET ORAL
Qty: 90 TABLET | Refills: 3 | OUTPATIENT
Start: 2018-02-21

## 2018-03-02 ENCOUNTER — TELEPHONE (OUTPATIENT)
Dept: INTERNAL MEDICINE CLINIC | Facility: CLINIC | Age: 83
End: 2018-03-02

## 2018-03-02 DIAGNOSIS — I73.9 PERIPHERAL ARTERIAL DISEASE (HCC): ICD-10-CM

## 2018-03-02 RX ORDER — CILOSTAZOL 100 MG/1
100 TABLET ORAL 2 TIMES DAILY
Qty: 180 TABLET | Refills: 3 | Status: SHIPPED | OUTPATIENT
Start: 2018-03-02 | End: 2018-12-05

## 2018-03-02 NOTE — TELEPHONE ENCOUNTER
[facility] mail order requesting refill for Tilostavol 100 mg.  Franky 30 [phone]  Routed to Rx.

## 2018-04-30 ENCOUNTER — TELEPHONE (OUTPATIENT)
Dept: INTERNAL MEDICINE CLINIC | Facility: CLINIC | Age: 83
End: 2018-04-30

## 2018-05-01 ENCOUNTER — TELEPHONE (OUTPATIENT)
Dept: INTERNAL MEDICINE CLINIC | Facility: CLINIC | Age: 83
End: 2018-05-01

## 2018-05-01 NOTE — TELEPHONE ENCOUNTER
Called patient and relayed Dr Palacios New message to her. She verbalized understanding and was grateful for the message.

## 2018-05-01 NOTE — TELEPHONE ENCOUNTER
Physician admission orders faxed to [facility]. Fax confirmation received. Sent to billing.

## 2018-05-04 ENCOUNTER — TELEPHONE (OUTPATIENT)
Dept: INTERNAL MEDICINE CLINIC | Facility: CLINIC | Age: 83
End: 2018-05-04

## 2018-05-04 NOTE — TELEPHONE ENCOUNTER
Fay from [facility] called to speak with Dr. Fabien Holloway. She needs to verify 1 of the medications and also has a question about a diagnosis. Radha Garland can be reached at [phone].

## 2018-05-04 NOTE — TELEPHONE ENCOUNTER
Spoke with Marcial Christian; mom is a [facility] now. Will do pt/ot there. He has noticed that she is slowing down with her ability to walk. Feels like she is dragging her feet.      She odes have multiple compression fractures; consider seeing Dr. Kim Jeter to see

## 2018-05-09 ENCOUNTER — TELEPHONE (OUTPATIENT)
Dept: INTERNAL MEDICINE CLINIC | Facility: CLINIC | Age: 83
End: 2018-05-09

## 2018-05-09 ENCOUNTER — OFFICE VISIT (OUTPATIENT)
Dept: INTERNAL MEDICINE CLINIC | Facility: CLINIC | Age: 83
End: 2018-05-09

## 2018-05-09 VITALS
TEMPERATURE: 98 F | WEIGHT: 140 LBS | OXYGEN SATURATION: 94 % | SYSTOLIC BLOOD PRESSURE: 152 MMHG | HEIGHT: 62 IN | HEART RATE: 92 BPM | DIASTOLIC BLOOD PRESSURE: 90 MMHG | BODY MASS INDEX: 25.76 KG/M2

## 2018-05-09 DIAGNOSIS — R60.0 LOWER EXTREMITY EDEMA: Primary | ICD-10-CM

## 2018-05-09 DIAGNOSIS — G31.84 MILD COGNITIVE IMPAIRMENT WITH MEMORY LOSS: ICD-10-CM

## 2018-05-09 PROCEDURE — 99214 OFFICE O/P EST MOD 30 MIN: CPT | Performed by: INTERNAL MEDICINE

## 2018-05-09 PROCEDURE — G0463 HOSPITAL OUTPT CLINIC VISIT: HCPCS | Performed by: INTERNAL MEDICINE

## 2018-05-09 RX ORDER — MULTIVIT-MIN/IRON FUM/FOLIC AC 7.5 MG-4
1 TABLET ORAL DAILY
COMMUNITY
End: 2018-12-05

## 2018-05-09 RX ORDER — FUROSEMIDE 20 MG/1
20 TABLET ORAL DAILY
Qty: 30 TABLET | Refills: 0 | Status: SHIPPED | OUTPATIENT
Start: 2018-05-09 | End: 2018-06-19

## 2018-05-09 RX ORDER — FUROSEMIDE 20 MG/1
20 TABLET ORAL DAILY
Qty: 30 TABLET | Refills: 0 | Status: SHIPPED | OUTPATIENT
Start: 2018-05-09 | End: 2018-06-21

## 2018-05-09 RX ORDER — AMITRIPTYLINE HYDROCHLORIDE 10 MG/1
10 TABLET, FILM COATED ORAL NIGHTLY
Qty: 90 TABLET | Refills: 3 | Status: SHIPPED | OUTPATIENT
Start: 2018-05-09 | End: 2018-12-05

## 2018-05-09 RX ORDER — ALENDRONATE SODIUM 70 MG/1
70 TABLET ORAL WEEKLY
Qty: 12 TABLET | Refills: 3 | Status: SHIPPED | OUTPATIENT
Start: 2018-05-09 | End: 2018-12-05

## 2018-05-09 RX ORDER — VIT A/VIT C/VIT E/ZINC/COPPER 7160-113
1 TABLET, DELAYED RELEASE (ENTERIC COATED) ORAL DAILY
COMMUNITY
End: 2018-12-05

## 2018-05-09 NOTE — TELEPHONE ENCOUNTER
Ok please call US to make sure appt is on Monday. Please notify patient's son that I sent in a prescription for the water pill (furosemide) to the [facility] on [address] as well as the mail order. She can take it once a day in the morning.    Encourage her to Aviir

## 2018-05-09 NOTE — TELEPHONE ENCOUNTER
Pt is asking if  can schedule her ultrasound of leg for this upcoming Mon. Her son is her caregiver and that's the only day he can take her.

## 2018-05-09 NOTE — TELEPHONE ENCOUNTER
LMTCB with US   - need to make sure patient has corey on 5/14   LMTCB with son -see DR. MIRACLE pino

## 2018-05-09 NOTE — PROGRESS NOTES
Clau Cole is a 80year old female. Patient presents with:  Edema: to feet and ankles bilaterally. onset: a couples days ago.  Pain with walking for long period of time, h/o claudication  Medication Request: pended refill request      HPI:   [place] Apoaequorin (PREVAGEN OR) Take 1 tablet by mouth daily. Disp:  Rfl:    Oxybutynin Chloride ER 5 MG Oral Tablet 24 Hr Take 5 mg by mouth daily.    Disp:  Rfl:       Past Medical History:   Diagnosis Date   • Arthritis    • Compression fracture of L1 lumbar or supraclavicular LAD  LUNGS: clear to auscultation bilaterally, no wheezing or rhonchi  CARDIO: RRR, normal S1S2, no gallops or murmurs  EXT: b/l ankle and pedal edema; no calf tenderness, neg imelda's sign. +1 DP and PT pulses bilaterally.        ASSESSME

## 2018-05-09 NOTE — TELEPHONE ENCOUNTER
Left message on son's phone to discuss plan. If he would like me to change to a different day that would be fine, but would prefer that they get this done this week.

## 2018-05-09 NOTE — TELEPHONE ENCOUNTER
Relayed MD's message to patient---verbalized understanding  Son states US is scheduled May 14th at 715pm as confirmed in [address] appointments

## 2018-05-11 ENCOUNTER — TELEPHONE (OUTPATIENT)
Dept: INTERNAL MEDICINE CLINIC | Facility: CLINIC | Age: 83
End: 2018-05-11

## 2018-05-11 RX ORDER — POTASSIUM CHLORIDE 750 MG/1
10 TABLET, EXTENDED RELEASE ORAL DAILY
Qty: 30 TABLET | Refills: 1 | Status: SHIPPED | OUTPATIENT
Start: 2018-05-11 | End: 2018-06-19

## 2018-05-11 NOTE — TELEPHONE ENCOUNTER
Spoke with Paige Bush (nurse at [employer]); [phone]. She will arrange for venous dopplers to be done at [facility]. Please notify patient and patient's son that [address] will be done either today or Monday at patient's facility.      Please call a

## 2018-05-11 NOTE — TELEPHONE ENCOUNTER
Please call pt  Dr Alivia Meadows wanted her to schedule ultra sound, earliest pt can have done at [address] [facility]note 2[facility] is next Monday.   Pt found that she could have ultra sound at [address] at her residence center   This can be arranged with her nurse Keron Cummins  at her residence/

## 2018-05-11 NOTE — TELEPHONE ENCOUNTER
Attempted to reach patient. Relayed Dr Chitra Patel message on VM per hipaa. Attempted to reach son. [address] [facility] scheduling [phone] and spoke to Son. She cancelled U/S at [address] [place] [address] for Monday.

## 2018-05-11 NOTE — TELEPHONE ENCOUNTER
Son Rossy Amrita called back. I relayed Dr Ameena Oliver message to him. He verbalized understanding and was grateful for the message.

## 2018-05-11 NOTE — TELEPHONE ENCOUNTER
Please call pt, was she to receive RX for Potassium?   Not sure if that was intended after her appt the other day  Please call to advise  Pt would use [facility] for this RX  Tasked to nursing

## 2018-05-15 ENCOUNTER — TELEPHONE (OUTPATIENT)
Dept: INTERNAL MEDICINE CLINIC | Facility: CLINIC | Age: 83
End: 2018-05-15

## 2018-05-15 NOTE — TELEPHONE ENCOUNTER
Please notify patient and son that patient's venous doppler was negative for blood clot. How is the swelling in the legs?

## 2018-05-15 NOTE — TELEPHONE ENCOUNTER
To Dr. Екатерина Sanderson - your message relayed to pt who verbalized understanding. Pt states swelling has gone way down, not swollen at all. Pt states son is at [employer] and she will relay this message to him.

## 2018-05-16 ENCOUNTER — MED REC SCAN ONLY (OUTPATIENT)
Dept: INTERNAL MEDICINE CLINIC | Facility: CLINIC | Age: 83
End: 2018-05-16

## 2018-05-16 ENCOUNTER — TELEPHONE (OUTPATIENT)
Dept: INTERNAL MEDICINE CLINIC | Facility: CLINIC | Age: 83
End: 2018-05-16

## 2018-05-16 NOTE — TELEPHONE ENCOUNTER
Received Home Health Certification and Plan of Care from [facility] along with PT eval and treat. Dr Rowena Ponce reviewed and signed. Faxed back to [facility] [phone] with confirmation received. Forms placed in scanning.

## 2018-05-25 ENCOUNTER — TELEPHONE (OUTPATIENT)
Dept: INTERNAL MEDICINE CLINIC | Facility: CLINIC | Age: 83
End: 2018-05-25

## 2018-05-25 NOTE — TELEPHONE ENCOUNTER
Received fax from [facility] for for bilateral venous doppler signed by Dr Jesi Mckeon and faxed back to [phone] with confirmation received. Signed orders sent to scanning.

## 2018-06-19 DIAGNOSIS — Z51.81 MEDICATION MONITORING ENCOUNTER: Primary | ICD-10-CM

## 2018-06-19 RX ORDER — POTASSIUM CHLORIDE 750 MG/1
10 TABLET, EXTENDED RELEASE ORAL DAILY
Qty: 30 TABLET | Refills: 0 | Status: SHIPPED | OUTPATIENT
Start: 2018-06-19 | End: 2018-09-24

## 2018-06-19 RX ORDER — FUROSEMIDE 20 MG/1
20 TABLET ORAL DAILY
Qty: 30 TABLET | Refills: 0 | Status: SHIPPED | OUTPATIENT
Start: 2018-06-19 | End: 2019-05-31

## 2018-06-19 NOTE — TELEPHONE ENCOUNTER
Patient has extreme swollen ankles - left is worse - can not see ankle anymore. Been going on for 3 days. Patient wants a refill for:      Lasix 10mg - daily      Potassium Supplement 20 MEQ - daily    She took her last pill this morning.       Call lima

## 2018-06-19 NOTE — TELEPHONE ENCOUNTER
To Dr. Lynne Elena - see below LMTCB - refill above pended meds? See 5/15/18 encounter - bilateral venous dopplers negative.

## 2018-06-19 NOTE — TELEPHONE ENCOUNTER
To Dr. Maribell Rodríguez - Pt states ankles are much worse - this AM could not put shoes on. Pt has had legs elevated - lasix is the only thing that brings legs down. Pt unable to come to office - Would like Rx sent [facility] - friend will  for pt.   Katey Mcdaniel

## 2018-06-22 RX ORDER — FUROSEMIDE 20 MG/1
TABLET ORAL
Qty: 90 TABLET | Refills: 3 | Status: ON HOLD | OUTPATIENT
Start: 2018-06-22 | End: 2018-06-30

## 2018-06-29 ENCOUNTER — APPOINTMENT (OUTPATIENT)
Dept: CT IMAGING | Facility: HOSPITAL | Age: 83
DRG: 191 | End: 2018-06-29
Attending: EMERGENCY MEDICINE
Payer: MEDICARE

## 2018-06-29 ENCOUNTER — HOSPITAL ENCOUNTER (INPATIENT)
Facility: HOSPITAL | Age: 83
LOS: 2 days | Discharge: HOME | DRG: 191 | End: 2018-07-02
Attending: EMERGENCY MEDICINE | Admitting: INTERNAL MEDICINE
Payer: MEDICARE

## 2018-06-29 ENCOUNTER — APPOINTMENT (OUTPATIENT)
Dept: GENERAL RADIOLOGY | Facility: HOSPITAL | Age: 83
DRG: 191 | End: 2018-06-29
Attending: EMERGENCY MEDICINE
Payer: MEDICARE

## 2018-06-29 ENCOUNTER — PRIOR ORIGINAL RECORDS (OUTPATIENT)
Dept: OTHER | Age: 83
End: 2018-06-29

## 2018-06-29 DIAGNOSIS — R06.89 RESPIRATORY INSUFFICIENCY: ICD-10-CM

## 2018-06-29 DIAGNOSIS — R09.02 HYPOXIA: Primary | ICD-10-CM

## 2018-06-29 LAB
ANION GAP SERPL CALC-SCNC: 10 MMOL/L (ref 0–18)
BASE EXCESS BLD CALC-SCNC: 1.3 MMOL/L (ref ?–2)
BASOPHILS # BLD: 0 K/UL (ref 0–0.2)
BASOPHILS NFR BLD: 0 %
BNP SERPL-MCNC: 26 PG/ML (ref 0–100)
BUN SERPL-MCNC: 18 MG/DL (ref 8–20)
BUN/CREAT SERPL: 19.1 (ref 10–20)
CALCIUM SERPL-MCNC: 9.1 MG/DL (ref 8.5–10.5)
CHLORIDE SERPL-SCNC: 101 MMOL/L (ref 95–110)
CO2 SERPL-SCNC: 25 MMOL/L (ref 22–32)
CREAT SERPL-MCNC: 0.94 MG/DL (ref 0.5–1.5)
D DIMER PPP FEU-MCNC: 1.71 MCG/ML (ref ?–0.84)
EOSINOPHIL # BLD: 0.9 K/UL (ref 0–0.7)
EOSINOPHIL NFR BLD: 8 %
ERYTHROCYTE [DISTWIDTH] IN BLOOD BY AUTOMATED COUNT: 13.9 % (ref 11–15)
GLUCOSE SERPL-MCNC: 126 MG/DL (ref 70–99)
HCO3 BLDV-SCNC: 27.3 MEQ/L (ref 22–26)
HCT VFR BLD AUTO: 42 % (ref 35–48)
HGB BLD-MCNC: 13.9 G/DL (ref 12–16)
LYMPHOCYTES # BLD: 1.5 K/UL (ref 1–4)
LYMPHOCYTES NFR BLD: 14 %
MAGNESIUM SERPL-MCNC: 1.9 MG/DL (ref 1.8–2.5)
MCH RBC QN AUTO: 31.7 PG (ref 27–32)
MCHC RBC AUTO-ENTMCNC: 33.1 G/DL (ref 32–37)
MCV RBC AUTO: 95.7 FL (ref 80–100)
MONOCYTES # BLD: 0.5 K/UL (ref 0–1)
MONOCYTES NFR BLD: 5 %
NEUTROPHILS # BLD AUTO: 7.9 K/UL (ref 1.8–7.7)
NEUTROPHILS NFR BLD: 71 %
NEUTS BAND NFR BLD: 2 %
O2 CT BLD-SCNC: 8.1 VOL% (ref 15–23)
OSMOLALITY UR CALC.SUM OF ELEC: 285 MOSM/KG (ref 275–295)
OXYGEN LITERS/MINUTE: 2 L/MIN
PCO2 BLDV: 51 MM HG (ref 38–50)
PH BLDV: 7.35 [PH] (ref 7.32–7.43)
PLATELET # BLD AUTO: 141 K/UL (ref 140–400)
PMV BLD AUTO: 8.7 FL (ref 7.4–10.3)
PO2 BLDV: 29 MM HG (ref 35–40)
PO2 SATN ADJ TO 0.5 BLD: 33 MMHG (ref 24–28)
POTASSIUM SERPL-SCNC: 4.9 MMOL/L (ref 3.3–5.1)
PROCALCITONIN SERPL-MCNC: 0.09 NG/ML (ref ?–0.11)
PUNCTURE CHARGE: NO
RBC # BLD AUTO: 4.39 M/UL (ref 3.7–5.4)
SAO2 % BLDV: 41.4 % (ref 60–85)
SODIUM SERPL-SCNC: 136 MMOL/L (ref 136–144)
TROPONIN I SERPL-MCNC: 0 NG/ML (ref ?–0.03)
WBC # BLD AUTO: 10.8 K/UL (ref 4–11)

## 2018-06-29 PROCEDURE — 71045 X-RAY EXAM CHEST 1 VIEW: CPT | Performed by: EMERGENCY MEDICINE

## 2018-06-29 RX ORDER — METHYLPREDNISOLONE SODIUM SUCCINATE 40 MG/ML
40 INJECTION, POWDER, LYOPHILIZED, FOR SOLUTION INTRAMUSCULAR; INTRAVENOUS ONCE
Status: COMPLETED | OUTPATIENT
Start: 2018-06-29 | End: 2018-06-29

## 2018-06-29 RX ORDER — IPRATROPIUM BROMIDE AND ALBUTEROL SULFATE 2.5; .5 MG/3ML; MG/3ML
3 SOLUTION RESPIRATORY (INHALATION) ONCE
Status: COMPLETED | OUTPATIENT
Start: 2018-06-29 | End: 2018-06-29

## 2018-06-30 ENCOUNTER — APPOINTMENT (OUTPATIENT)
Dept: CT IMAGING | Facility: HOSPITAL | Age: 83
DRG: 191 | End: 2018-06-30
Attending: EMERGENCY MEDICINE
Payer: MEDICARE

## 2018-06-30 PROBLEM — R06.89 RESPIRATORY INSUFFICIENCY: Status: ACTIVE | Noted: 2018-06-30

## 2018-06-30 PROBLEM — J44.1 COPD EXACERBATION (HCC): Status: ACTIVE | Noted: 2018-06-30

## 2018-06-30 LAB
ADENOVIRUS PCR:: NEGATIVE
B PERT DNA SPEC QL NAA+PROBE: NEGATIVE
BILIRUB UR QL: NEGATIVE
C PNEUM DNA SPEC QL NAA+PROBE: NEGATIVE
CLARITY UR: CLEAR
COLOR UR: YELLOW
CORONAVIRUS 229E PCR:: NEGATIVE
CORONAVIRUS HKU1 PCR:: NEGATIVE
CORONAVIRUS NL63 PCR:: NEGATIVE
CORONAVIRUS OC43 PCR:: NEGATIVE
FLUAV RNA SPEC QL NAA+PROBE: NEGATIVE
FLUBV RNA SPEC QL NAA+PROBE: NEGATIVE
GLUCOSE UR-MCNC: NEGATIVE MG/DL
HGB UR QL STRIP.AUTO: NEGATIVE
KETONES UR-MCNC: NEGATIVE MG/DL
LACTATE SERPL-SCNC: 2 MMOL/L (ref 0.5–2.2)
LEUKOCYTE ESTERASE UR QL STRIP.AUTO: NEGATIVE
METAPNEUMOVIRUS PCR:: NEGATIVE
MYCOPLASMA PNEUMONIA PCR:: NEGATIVE
NITRITE UR QL STRIP.AUTO: NEGATIVE
PARAINFLUENZA 1 PCR:: NEGATIVE
PARAINFLUENZA 2 PCR:: NEGATIVE
PARAINFLUENZA 3 PCR:: NEGATIVE
PARAINFLUENZA 4 PCR:: NEGATIVE
PH UR: 5 [PH] (ref 5–8)
PROT UR-MCNC: NEGATIVE MG/DL
RHINOVIRUS/ENTERO PCR:: NEGATIVE
RSV RNA SPEC QL NAA+PROBE: NEGATIVE
UROBILINOGEN UR STRIP-ACNC: <2
VIT C UR-MCNC: NEGATIVE MG/DL

## 2018-06-30 PROCEDURE — 99223 1ST HOSP IP/OBS HIGH 75: CPT | Performed by: INTERNAL MEDICINE

## 2018-06-30 PROCEDURE — 71260 CT THORAX DX C+: CPT | Performed by: EMERGENCY MEDICINE

## 2018-06-30 RX ORDER — ALENDRONATE SODIUM 70 MG/1
70 TABLET ORAL WEEKLY
Status: DISCONTINUED | OUTPATIENT
Start: 2018-07-05 | End: 2018-07-02

## 2018-06-30 RX ORDER — SIMVASTATIN 20 MG
20 TABLET ORAL NIGHTLY
Status: DISCONTINUED | OUTPATIENT
Start: 2018-06-30 | End: 2018-06-30

## 2018-06-30 RX ORDER — ASPIRIN 81 MG/1
81 TABLET ORAL DAILY
Status: DISCONTINUED | OUTPATIENT
Start: 2018-06-30 | End: 2018-07-02

## 2018-06-30 RX ORDER — 0.9 % SODIUM CHLORIDE 0.9 %
VIAL (ML) INJECTION
Status: COMPLETED
Start: 2018-06-30 | End: 2018-06-30

## 2018-06-30 RX ORDER — OYSTER SHELL CALCIUM WITH VITAMIN D 500; 200 MG/1; [IU]/1
1 TABLET, FILM COATED ORAL 2 TIMES DAILY
Status: DISCONTINUED | OUTPATIENT
Start: 2018-06-30 | End: 2018-07-02

## 2018-06-30 RX ORDER — ACETAMINOPHEN 325 MG/1
650 TABLET ORAL EVERY 6 HOURS PRN
Status: DISCONTINUED | OUTPATIENT
Start: 2018-06-30 | End: 2018-07-02

## 2018-06-30 RX ORDER — VITS A,C,E/LUTEIN/MINERALS 300MCG-200
1 TABLET ORAL DAILY
Status: DISCONTINUED | OUTPATIENT
Start: 2018-06-30 | End: 2018-07-02

## 2018-06-30 RX ORDER — ATORVASTATIN CALCIUM 10 MG/1
10 TABLET, FILM COATED ORAL NIGHTLY
Status: DISCONTINUED | OUTPATIENT
Start: 2018-06-30 | End: 2018-07-02

## 2018-06-30 RX ORDER — CILOSTAZOL 100 MG/1
100 TABLET ORAL 2 TIMES DAILY
Status: DISCONTINUED | OUTPATIENT
Start: 2018-06-30 | End: 2018-07-02

## 2018-06-30 RX ORDER — IPRATROPIUM BROMIDE AND ALBUTEROL SULFATE 2.5; .5 MG/3ML; MG/3ML
3 SOLUTION RESPIRATORY (INHALATION) EVERY 6 HOURS
Status: DISCONTINUED | OUTPATIENT
Start: 2018-06-30 | End: 2018-07-01

## 2018-06-30 RX ORDER — FUROSEMIDE 20 MG/1
20 TABLET ORAL DAILY
Status: DISCONTINUED | OUTPATIENT
Start: 2018-06-30 | End: 2018-07-02

## 2018-06-30 RX ORDER — MULTIPLE VITAMINS W/ MINERALS TAB 9MG-400MCG
1 TAB ORAL DAILY
Status: DISCONTINUED | OUTPATIENT
Start: 2018-06-30 | End: 2018-07-02

## 2018-06-30 RX ORDER — OXYBUTYNIN CHLORIDE 5 MG/1
5 TABLET, EXTENDED RELEASE ORAL DAILY
Status: DISCONTINUED | OUTPATIENT
Start: 2018-06-30 | End: 2018-07-02

## 2018-06-30 RX ORDER — POTASSIUM CHLORIDE 750 MG/1
10 TABLET, EXTENDED RELEASE ORAL DAILY
Status: DISCONTINUED | OUTPATIENT
Start: 2018-06-30 | End: 2018-07-02

## 2018-06-30 RX ORDER — METHYLPREDNISOLONE SODIUM SUCCINATE 40 MG/ML
40 INJECTION, POWDER, LYOPHILIZED, FOR SOLUTION INTRAMUSCULAR; INTRAVENOUS EVERY 12 HOURS
Status: DISCONTINUED | OUTPATIENT
Start: 2018-06-30 | End: 2018-07-02

## 2018-06-30 RX ORDER — AMITRIPTYLINE HYDROCHLORIDE 10 MG/1
10 TABLET, FILM COATED ORAL NIGHTLY
Status: DISCONTINUED | OUTPATIENT
Start: 2018-06-30 | End: 2018-07-02

## 2018-06-30 RX ORDER — HEPARIN SODIUM 5000 [USP'U]/ML
5000 INJECTION, SOLUTION INTRAVENOUS; SUBCUTANEOUS EVERY 12 HOURS SCHEDULED
Status: DISCONTINUED | OUTPATIENT
Start: 2018-06-30 | End: 2018-07-02

## 2018-06-30 NOTE — H&P
[address] Patient Status:  Inpatient    1934 MRN [id number]   Location [facility] 5SW/SE Attending Kiera Linder MD   Hosp Day # 0 PCP Ifeanyi Henriquez DO     Date:  2018  Date vascular disease - the patient states she started Pletal in 2014; follows vascular surgeon Dr Misty Carrera [facility]; CTA 6/2017 shows moderate stenosis in distal R common femoral artery, complete occlusion of both popliteal arteries.   12. Status post L2-S1 decompress (Tab) 70 MG Oral Take 1 tablet (70 mg total) by mouth once a week. Amitriptyline HCl (Tab) 10 MG Oral Take 1 tablet (10 mg total) by mouth nightly. Apoaequorin  Oral Take 1 tablet by mouth daily.       Aspirin (Tab) 81 MG Oral Take 1 tablet (81 mg Cerebrovascular accident. The patient has a brother with coronary artery disease,   another brother with coronary artery disease status post CABG. The   patient has a sister with history of breast cancer and an aunt with a history of colon cancer. light touch and proprioception intact  Skin: no rash or ulcerations            Laboratory Data:       Lab Results  Component Value Date    06/29/2018    06/29/2018   K 4.9 06/29/2018    06/29/2018   CO2 25 06/29/2018   BUN 18 06/29/2018 COPD  CT chest shows mild centrilobular COPD; she has significant smoking history, though she quit in 2013; has 45 pack-year history; CXR shows COPD with interstitial scarring/fibrosis. Bibasilar atelectasis or scarring. Old granulomatous disease.   D- dime 2.2x1. 8x1.6cm, suspicious for malignancy. Evaluated by urologist, Dr Falguni Gaitan. Surgery has been deferred per patient.     Left Subclavian stenosis  CT chest shows occluded L subclavian artery, unchanged from  3/2017    History of DVT  She had a DVT in 2014,

## 2018-06-30 NOTE — PLAN OF CARE
Problem: Patient/Family Goals  Goal: Patient/Family Long Term Goal  Patient's Long Term Goal: Discharge back to home    Interventions:  - Administer meds as ordered  - Monitor lab works  - See additional Care Plan goals for specific interventions    Outcom injury  - Provide assistive devices as appropriate  - Consider OT/PT consult to assist with strengthening/mobility  - Encourage toileting schedule   Outcome: Progressing      Problem: DISCHARGE PLANNING  Goal: Discharge to home or other facility with appro

## 2018-06-30 NOTE — ED PROVIDER NOTES
Patient Seen in: [facility] AND [facility] Emergency Department    History   Patient presents with:  Dyspnea EDA SOB (respiratory)    Stated Complaint: EDA     HPI    81 yo F with PMH renal CA, (?) RLE DVT, SLE, HTN, HL, PVD on ASA/cilostazol presenting with one d tablet by mouth daily. Amitriptyline HCl 10 MG Oral Tab,  Take 1 tablet (10 mg total) by mouth nightly. Alendronate Sodium 70 MG Oral Tab,  Take 1 tablet (70 mg total) by mouth once a week.    cilostazol 100 MG Oral Tab,  Take 1 tablet (100 mg total) by otherwise stated in HPI.     Physical Exam   ED Triage Vitals [06/29/18 2132]  BP: (!) 168/110  Pulse: 105  Resp: 24  Temp: 100 °F (37.8 °C)  Temp src: Temporal  SpO2: 94 %  O2 Device: Nasal cannula    Current:BP (!) 168/110   Pulse 105   Temp 100 °F (37.8 -----------         ------                     CBC W/ DIFFERENTIAL[[id number]]          Abnormal            Final result                 Please view results for these tests on the individual orders.    LACTIC ACID, PLASMA   URINALYSIS WITH Interpretation: Pulse Readings from Last 1 Encounters:  06/29/18 : 105  , sinus,      Evaluation for borderline fever associated with nonproductive cough/pleurisy in patient with (?) DVT and pleurisy/wheezing and dry crackles noted.  Will obtain labs/VBG an

## 2018-06-30 NOTE — ED INITIAL ASSESSMENT (HPI)
Pt reports to ED via [facility] with complaints of SOB, left sided lung pain, and cough starting today. Pt on room air satting 89-91%. Pt denies fever or resp hist. Pt just was diagnosed with R kidney cancer.

## 2018-07-01 LAB
ANION GAP SERPL CALC-SCNC: 4 MMOL/L (ref 0–18)
BASOPHILS # BLD: 0 K/UL (ref 0–0.2)
BASOPHILS NFR BLD: 0 %
BUN SERPL-MCNC: 16 MG/DL (ref 8–20)
BUN/CREAT SERPL: 17.6 (ref 10–20)
CALCIUM SERPL-MCNC: 9 MG/DL (ref 8.5–10.5)
CHLORIDE SERPL-SCNC: 108 MMOL/L (ref 95–110)
CO2 SERPL-SCNC: 26 MMOL/L (ref 22–32)
CREAT SERPL-MCNC: 0.91 MG/DL (ref 0.5–1.5)
EOSINOPHIL # BLD: 0 K/UL (ref 0–0.7)
EOSINOPHIL NFR BLD: 0 %
ERYTHROCYTE [DISTWIDTH] IN BLOOD BY AUTOMATED COUNT: 13.6 % (ref 11–15)
GLUCOSE SERPL-MCNC: 136 MG/DL (ref 70–99)
HCT VFR BLD AUTO: 36.7 % (ref 35–48)
HGB BLD-MCNC: 12.1 G/DL (ref 12–16)
LYMPHOCYTES # BLD: 0.8 K/UL (ref 1–4)
LYMPHOCYTES NFR BLD: 7 %
MCH RBC QN AUTO: 31.6 PG (ref 27–32)
MCHC RBC AUTO-ENTMCNC: 33.1 G/DL (ref 32–37)
MCV RBC AUTO: 95.6 FL (ref 80–100)
MONOCYTES # BLD: 0.4 K/UL (ref 0–1)
MONOCYTES NFR BLD: 3 %
NEUTROPHILS # BLD AUTO: 11.1 K/UL (ref 1.8–7.7)
NEUTROPHILS NFR BLD: 90 %
OSMOLALITY UR CALC.SUM OF ELEC: 289 MOSM/KG (ref 275–295)
PLATELET # BLD AUTO: 170 K/UL (ref 140–400)
PMV BLD AUTO: 8.8 FL (ref 7.4–10.3)
POTASSIUM SERPL-SCNC: 4.3 MMOL/L (ref 3.3–5.1)
RBC # BLD AUTO: 3.84 M/UL (ref 3.7–5.4)
SODIUM SERPL-SCNC: 138 MMOL/L (ref 136–144)
WBC # BLD AUTO: 12.4 K/UL (ref 4–11)

## 2018-07-01 PROCEDURE — 99232 SBSQ HOSP IP/OBS MODERATE 35: CPT | Performed by: INTERNAL MEDICINE

## 2018-07-01 RX ORDER — IPRATROPIUM BROMIDE AND ALBUTEROL SULFATE 2.5; .5 MG/3ML; MG/3ML
3 SOLUTION RESPIRATORY (INHALATION)
Status: DISCONTINUED | OUTPATIENT
Start: 2018-07-01 | End: 2018-07-02

## 2018-07-01 RX ORDER — 0.9 % SODIUM CHLORIDE 0.9 %
VIAL (ML) INJECTION
Status: COMPLETED
Start: 2018-07-01 | End: 2018-07-01

## 2018-07-01 RX ORDER — IPRATROPIUM BROMIDE AND ALBUTEROL SULFATE 2.5; .5 MG/3ML; MG/3ML
3 SOLUTION RESPIRATORY (INHALATION) EVERY 6 HOURS PRN
Status: DISCONTINUED | OUTPATIENT
Start: 2018-07-01 | End: 2018-07-02

## 2018-07-01 NOTE — PROGRESS NOTES
[facility]    Progress Note    Merline Daughters Patient Status:  Inpatient    1934 MRN [id number]   Location [facility] 5SW/SE Attending Ta Leigh MD   Hosp Day # 1 PCP Jessica Justin DO         Assessment and Plan: 2014.      Renal mass   renal US in Jan 2018 showing anechoic cyst measuring 1.8 x 1.5 x 1.5cm cyst at the upper pole of the R kidney and 1.1x0.9x1.1cm lesion with intermediate echogenicity at the upper pole of the L kidney.   Subsequent MRI abdomen in Jan Medications:  ipratropium-albuterol (DUONEB) nebulizer solution 3 mL 3 mL Nebulization TID   ipratropium-albuterol (DUONEB) nebulizer solution 3 mL 3 mL Nebulization Q6H PRN   multivitamin with minerals (ADULT) tab 1 tablet 1 tablet Oral Daily   [START ON input(s): URINE, CULTI, BLDSMR in the last 168 hours. Imaging:  Xr Chest Ap Portable  (cpt=71045)    Result Date: 6/30/2018  CONCLUSION:  1. COPD with interstitial scarring/fibrosis. Bibasilar atelectasis or scarring. Old granulomatous disease.  2. No

## 2018-07-02 ENCOUNTER — TELEPHONE (OUTPATIENT)
Dept: INTERNAL MEDICINE CLINIC | Facility: CLINIC | Age: 83
End: 2018-07-02

## 2018-07-02 ENCOUNTER — TELEPHONE (OUTPATIENT)
Dept: MEDSURG UNIT | Facility: HOSPITAL | Age: 83
End: 2018-07-02

## 2018-07-02 VITALS
SYSTOLIC BLOOD PRESSURE: 125 MMHG | WEIGHT: 135 LBS | DIASTOLIC BLOOD PRESSURE: 62 MMHG | OXYGEN SATURATION: 96 % | HEIGHT: 62 IN | TEMPERATURE: 97 F | RESPIRATION RATE: 18 BRPM | BODY MASS INDEX: 24.84 KG/M2 | HEART RATE: 108 BPM

## 2018-07-02 LAB
ANION GAP SERPL CALC-SCNC: 10 MMOL/L (ref 0–18)
BASOPHILS # BLD: 0 K/UL (ref 0–0.2)
BASOPHILS NFR BLD: 0 %
BUN SERPL-MCNC: 24 MG/DL (ref 8–20)
BUN/CREAT SERPL: 24.2 (ref 10–20)
CALCIUM SERPL-MCNC: 9.4 MG/DL (ref 8.5–10.5)
CHLORIDE SERPL-SCNC: 105 MMOL/L (ref 95–110)
CO2 SERPL-SCNC: 25 MMOL/L (ref 22–32)
CREAT SERPL-MCNC: 0.99 MG/DL (ref 0.5–1.5)
EOSINOPHIL # BLD: 0 K/UL (ref 0–0.7)
EOSINOPHIL NFR BLD: 0 %
ERYTHROCYTE [DISTWIDTH] IN BLOOD BY AUTOMATED COUNT: 13.9 % (ref 11–15)
GLUCOSE SERPL-MCNC: 127 MG/DL (ref 70–99)
HCT VFR BLD AUTO: 36.8 % (ref 35–48)
HGB BLD-MCNC: 12.3 G/DL (ref 12–16)
LYMPHOCYTES # BLD: 0.8 K/UL (ref 1–4)
LYMPHOCYTES NFR BLD: 7 %
MCH RBC QN AUTO: 32.1 PG (ref 27–32)
MCHC RBC AUTO-ENTMCNC: 33.5 G/DL (ref 32–37)
MCV RBC AUTO: 95.7 FL (ref 80–100)
MONOCYTES # BLD: 0.4 K/UL (ref 0–1)
MONOCYTES NFR BLD: 4 %
NEUTROPHILS # BLD AUTO: 10.3 K/UL (ref 1.8–7.7)
NEUTROPHILS NFR BLD: 89 %
OSMOLALITY UR CALC.SUM OF ELEC: 296 MOSM/KG (ref 275–295)
PLATELET # BLD AUTO: 178 K/UL (ref 140–400)
PMV BLD AUTO: 8.2 FL (ref 7.4–10.3)
POTASSIUM SERPL-SCNC: 4.6 MMOL/L (ref 3.3–5.1)
RBC # BLD AUTO: 3.84 M/UL (ref 3.7–5.4)
SODIUM SERPL-SCNC: 140 MMOL/L (ref 136–144)
WBC # BLD AUTO: 11.6 K/UL (ref 4–11)

## 2018-07-02 PROCEDURE — 99238 HOSP IP/OBS DSCHRG MGMT 30/<: CPT | Performed by: INTERNAL MEDICINE

## 2018-07-02 RX ORDER — PREDNISONE 20 MG/1
TABLET ORAL
Qty: 3 TABLET | Refills: 0 | Status: SHIPPED | OUTPATIENT
Start: 2018-07-02 | End: 2018-07-03

## 2018-07-02 NOTE — RESPIRATORY THERAPY NOTE
Completed COPD eduction and MDI education. Went over breathing techniques. Very interested in attending Pulmonary Rehab when she is able.

## 2018-07-02 NOTE — PROGRESS NOTES
[place] - [facility]    Progress Note    Kusum Peer Patient Status:  Inpatient    1934 MRN [id number]   Location [facility] 5SW/SE Attending Michael Burrows MD   Hosp Day # 2 PCP Laurie Soto DO       SUBJECTIVE:  States she Jeny Larson MD on 6/30/2018 at 5:53     Approved by (CST): Jeny Larson MD on 6/30/2018 at 5:57          Ct Chest Pain/pe (iv Only) Em    Result Date: 6/30/2018  CONCLUSION:  1.  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     Osteoporosis   the patient had bone scan with T-score of minus 3.1; on alendronate 70 mg po qWeek      Multiple vertebra

## 2018-07-02 NOTE — PLAN OF CARE
DISCHARGE PLANNING    • Discharge to home or other facility with appropriate resources Completed        Patient Centered Care    • Patient preferences are identified and integrated in the patient's plan of care Completed        Patient/Family Goals    • Pa

## 2018-07-03 ENCOUNTER — TELEPHONE (OUTPATIENT)
Dept: MEDSURG UNIT | Facility: HOSPITAL | Age: 83
End: 2018-07-03

## 2018-07-03 ENCOUNTER — PATIENT OUTREACH (OUTPATIENT)
Dept: CASE MANAGEMENT | Age: 83
End: 2018-07-03

## 2018-07-03 ENCOUNTER — TELEPHONE (OUTPATIENT)
Dept: INTERNAL MEDICINE CLINIC | Facility: CLINIC | Age: 83
End: 2018-07-03

## 2018-07-03 RX ORDER — PREDNISONE 20 MG/1
TABLET ORAL
Qty: 3 TABLET | Refills: 0 | Status: SHIPPED | OUTPATIENT
Start: 2018-07-03 | End: 2018-07-09 | Stop reason: ALTCHOICE

## 2018-07-03 NOTE — TELEPHONE ENCOUNTER
Yesterday a script for Prednisone was called in this should have gone to [facility], on [address], please resend   Tasked to nursing high

## 2018-07-03 NOTE — PROGRESS NOTES
Haseeb Gomez [phone] for post hospital follow up, [facility] contact information provided. TCM book by date 7/16/2018.

## 2018-07-03 NOTE — TELEPHONE ENCOUNTER
Called patients son per hipaa and relayed message that RX was sent to local pharmacy - verbalized understanding .  RX sent

## 2018-07-05 NOTE — DISCHARGE SUMMARY
[facility] AND [facility]  Discharge Summary    Yash Corey Patient Status:  Inpatient    1934 MRN [id number]   Location [facility] 5SW/SE Attending No att. providers found   Hosp Day # 2 PCP Romi Quintanilla DO     Date of Admission: 2018    D congestion. She denies angina or chest pain. She denies leg swelling. She was brought to ED by paramedics for further evaluation. CXR shows COPD with interstitial scarring/fibrosis. Bibasilar atelectasis or scarring. Old granulomatous disease.   D- dimer patient had bone scan with T-score of minus 3.1; on alendronate 70 mg po qWeek      Multiple vertebral compression fractures  Status post T8 vertebroplasty in 2005.     Carotid artery stenosis  carotid Doppler 12/2015 showing mild bilateral carotid disease mouth daily. , Normal, Disp-30 tablet, R-0    Cholecalciferol (VITAMIN D) 1000 units Oral Tab  Take 1 tablet by mouth daily. , Historical    !! Multiple Vitamins-Minerals (ICAPS AREDS FORMULA) Oral Tab  Take 1 tablet by mouth daily. , Historical    !!  Multipl

## 2018-07-05 NOTE — PROGRESS NOTES
S/w patient, HIPAA verified. Patient declined to answer any questions stating that she will go over everything with her Pulmonologist and Dr. Michelle Pierre. Declined to complete Med reconciliation as well.  Instructed her to contact the office should there be any ne

## 2018-07-06 ENCOUNTER — TELEPHONE (OUTPATIENT)
Dept: CARDIOLOGY UNIT | Facility: HOSPITAL | Age: 83
End: 2018-07-06

## 2018-07-09 ENCOUNTER — OFFICE VISIT (OUTPATIENT)
Dept: CARDIOLOGY CLINIC | Facility: HOSPITAL | Age: 83
End: 2018-07-09
Attending: CLINICAL NURSE SPECIALIST
Payer: MEDICARE

## 2018-07-09 ENCOUNTER — OFFICE VISIT (OUTPATIENT)
Dept: INTERNAL MEDICINE CLINIC | Facility: CLINIC | Age: 83
End: 2018-07-09

## 2018-07-09 VITALS
HEART RATE: 101 BPM | DIASTOLIC BLOOD PRESSURE: 67 MMHG | OXYGEN SATURATION: 94 % | SYSTOLIC BLOOD PRESSURE: 104 MMHG | WEIGHT: 141.69 LBS | BODY MASS INDEX: 26 KG/M2

## 2018-07-09 VITALS
BODY MASS INDEX: 26 KG/M2 | HEART RATE: 92 BPM | WEIGHT: 141 LBS | TEMPERATURE: 98 F | SYSTOLIC BLOOD PRESSURE: 106 MMHG | DIASTOLIC BLOOD PRESSURE: 58 MMHG

## 2018-07-09 DIAGNOSIS — N28.89 RENAL MASS: ICD-10-CM

## 2018-07-09 DIAGNOSIS — J44.1 COPD EXACERBATION (HCC): ICD-10-CM

## 2018-07-09 DIAGNOSIS — J44.9 CHRONIC OBSTRUCTIVE PULMONARY DISEASE, UNSPECIFIED COPD TYPE (HCC): Primary | ICD-10-CM

## 2018-07-09 DIAGNOSIS — I50.9 HEART FAILURE, UNSPECIFIED (HCC): Primary | ICD-10-CM

## 2018-07-09 LAB
BASOPHILS # BLD: 0.2 K/UL (ref 0–0.2)
BASOPHILS NFR BLD: 2 %
EOSINOPHIL # BLD: 0.4 K/UL (ref 0–0.7)
EOSINOPHIL NFR BLD: 4 %
ERYTHROCYTE [DISTWIDTH] IN BLOOD BY AUTOMATED COUNT: 14.1 % (ref 11–15)
HCT VFR BLD AUTO: 42.2 % (ref 35–48)
HGB BLD-MCNC: 14.1 G/DL (ref 12–16)
LYMPHOCYTES # BLD: 1.9 K/UL (ref 1–4)
LYMPHOCYTES NFR BLD: 21 %
MCH RBC QN AUTO: 32.1 PG (ref 27–32)
MCHC RBC AUTO-ENTMCNC: 33.5 G/DL (ref 32–37)
MCV RBC AUTO: 96 FL (ref 80–100)
MONOCYTES # BLD: 0.2 K/UL (ref 0–1)
MONOCYTES NFR BLD: 2 %
NEUTROPHILS # BLD AUTO: 6.3 K/UL (ref 1.8–7.7)
NEUTROPHILS NFR BLD: 65 %
NEUTS BAND NFR BLD: 6 %
PLATELET # BLD AUTO: 206 K/UL (ref 140–400)
PMV BLD AUTO: 7.6 FL (ref 7.4–10.3)
RBC # BLD AUTO: 4.4 M/UL (ref 3.7–5.4)
WBC # BLD AUTO: 8.9 K/UL (ref 4–11)

## 2018-07-09 PROCEDURE — 99495 TRANSJ CARE MGMT MOD F2F 14D: CPT | Performed by: INTERNAL MEDICINE

## 2018-07-09 PROCEDURE — 85025 COMPLETE CBC W/AUTO DIFF WBC: CPT | Performed by: CLINICAL NURSE SPECIALIST

## 2018-07-09 PROCEDURE — 85007 BL SMEAR W/DIFF WBC COUNT: CPT | Performed by: CLINICAL NURSE SPECIALIST

## 2018-07-09 PROCEDURE — 85027 COMPLETE CBC AUTOMATED: CPT | Performed by: CLINICAL NURSE SPECIALIST

## 2018-07-09 PROCEDURE — 99214 OFFICE O/P EST MOD 30 MIN: CPT | Performed by: CLINICAL NURSE SPECIALIST

## 2018-07-09 PROCEDURE — 99211 OFF/OP EST MAY X REQ PHY/QHP: CPT | Performed by: CLINICAL NURSE SPECIALIST

## 2018-07-09 PROCEDURE — 1111F DSCHRG MED/CURRENT MED MERGE: CPT | Performed by: INTERNAL MEDICINE

## 2018-07-09 RX ORDER — BUDESONIDE AND FORMOTEROL FUMARATE DIHYDRATE 80; 4.5 UG/1; UG/1
2 AEROSOL RESPIRATORY (INHALATION) 2 TIMES DAILY
Qty: 3 INHALER | Refills: 5 | Status: SHIPPED | OUTPATIENT
Start: 2018-07-09 | End: 2018-08-24

## 2018-07-09 NOTE — PROGRESS NOTES
HPI:    Gisell Ren is a 80year old female here today for hospital follow up.    She was discharged from Inpatient hospital, [facility]  to [address] [place] [address] Date: 6/29/18  Discharge Date:7/2/18  Hospital Discharge Diagnosis:    Low back pain first order subsegmental pulmonary artery branches, mucous plugging, particularly in the left lower lobe, diffuse bronchitis, and mild centrilobular emphysema.  She has been given Duonebs QID and prn, Solumedrol 40 mg IV q12hrs, and empiric ceftriaxone 1 g total) by mouth daily. No current facility-administered medications on file prior to visit. HISTORY: reconciled and reviewed with patient  She  has a past medical history of Arthritis; Cancer ([facility] [address]  Compression fracture of L1 lumbar vertebra ([facility] [address]. PERRLA, EOMI, conjunctiva are clear  NECK: supple, no adenopathy, no bruits  CHEST: no chest tenderness  LUNGS: clear to auscultation  CARDIO: RRR without murmur  GI: good BS's, no masses, HSM or tenderness  MUSCULOSKELETAL: back is not tender, FROM of the

## 2018-07-09 NOTE — PATIENT INSTRUCTIONS
Avoid irritant such as cleaning aerosols, cigarette smoke, and avoid prolonged time spent outside during humid days    Less than 2000 mg sodium/salt diet.  Common high sodium foods include frozen dinners, soups (not homemade), some cereal, vegetable juice,

## 2018-07-09 NOTE — PROGRESS NOTES
[address] Patient Status:  Outpatient    1934 MRN [id number]   [place], [place]         Christian Bassett is a 80year old female who presents to clinic for COPD.      Sari Morales enlarged, symmetric, no tenderness/mass/nodules  Lungs: clear to auscultation bilaterally  Chest wall: no tenderness  Heart: S1, S2 normal, no murmur, click, rub or gallop, regular rate and rhythm  Abdomen: soft, non-tender; bowel sounds normal; no masses,

## 2018-08-12 ENCOUNTER — HOSPITAL ENCOUNTER (OUTPATIENT)
Dept: MRI IMAGING | Facility: HOSPITAL | Age: 83
Discharge: HOME | End: 2018-08-12
Attending: INTERNAL MEDICINE
Payer: MEDICARE

## 2018-08-12 DIAGNOSIS — N28.89 RENAL MASS: ICD-10-CM

## 2018-08-12 PROCEDURE — A9576 INJ PROHANCE MULTIPACK: HCPCS | Performed by: INTERNAL MEDICINE

## 2018-08-12 PROCEDURE — 74183 MRI ABD W/O CNTR FLWD CNTR: CPT | Performed by: INTERNAL MEDICINE

## 2018-08-13 ENCOUNTER — HOSPITAL ENCOUNTER (OUTPATIENT)
Dept: RESPIRATORY THERAPY | Facility: HOSPITAL | Age: 83
Discharge: HOME | End: 2018-08-13
Attending: INTERNAL MEDICINE
Payer: MEDICARE

## 2018-08-13 DIAGNOSIS — J44.9 CHRONIC OBSTRUCTIVE PULMONARY DISEASE, UNSPECIFIED COPD TYPE (HCC): ICD-10-CM

## 2018-08-13 PROCEDURE — 94726 PLETHYSMOGRAPHY LUNG VOLUMES: CPT | Performed by: INTERNAL MEDICINE

## 2018-08-13 PROCEDURE — 94729 DIFFUSING CAPACITY: CPT | Performed by: INTERNAL MEDICINE

## 2018-08-13 PROCEDURE — 94060 EVALUATION OF WHEEZING: CPT | Performed by: INTERNAL MEDICINE

## 2018-08-21 NOTE — ADDENDUM NOTE
Encounter addended by: Xavier Vora DO on: 8/21/2018  2:50 PM<BR>    Actions taken: Sign clinical note, Charge Capture section accepted

## 2018-08-21 NOTE — PROCEDURES
Pulmonary Function Test     Arline Ram Patient Status:  Outpatient    1934 MRN [id number]   Date of Exam 18 PCP Chris Ortiz,            Spirometry   FEV1:1.31 77%  FVC:1.95 87%  FEV1/FVC:0.67        Flow Volume Loop   Obstruction seen

## 2018-08-22 ENCOUNTER — TELEPHONE (OUTPATIENT)
Dept: INTERNAL MEDICINE CLINIC | Facility: CLINIC | Age: 83
End: 2018-08-22

## 2018-08-22 DIAGNOSIS — J44.9 CHRONIC OBSTRUCTIVE PULMONARY DISEASE, UNSPECIFIED COPD TYPE (HCC): ICD-10-CM

## 2018-08-22 NOTE — TELEPHONE ENCOUNTER
Patient son Mavis Sofia calling for results of MRI Kidney 8/12 and Pulmonary test 8/13.     Mavis Sofia at [phone]  Routed to clinical.

## 2018-08-24 RX ORDER — BUDESONIDE AND FORMOTEROL FUMARATE DIHYDRATE 80; 4.5 UG/1; UG/1
2 AEROSOL RESPIRATORY (INHALATION) 2 TIMES DAILY
Qty: 3 INHALER | Refills: 5 | Status: SHIPPED | OUTPATIENT
Start: 2018-08-24 | End: 2019-05-10

## 2018-08-24 NOTE — TELEPHONE ENCOUNTER
Discussed PFT results and well as MRI results. Sent in a new prescription for symbicort. Faxed results to Dr. Marian Beach for review.

## 2018-09-10 ENCOUNTER — TELEPHONE (OUTPATIENT)
Dept: INTERNAL MEDICINE CLINIC | Facility: CLINIC | Age: 83
End: 2018-09-10

## 2018-09-10 NOTE — TELEPHONE ENCOUNTER
Pt. Called stating she is in desperate need of sleeping pills. She states the amitriptiline is not working. Please send to her mail order [facility] pharmacy. Pt. Can be reached at [phone].

## 2018-09-10 NOTE — TELEPHONE ENCOUNTER
Please call and confirm that Dr. Kelsy Orr received Mrs. Yolanda Dean MRI results regarding her kidney mass; if not please fax these results to his attention.

## 2018-09-11 NOTE — TELEPHONE ENCOUNTER
To Dr. Rissa Beach - your message relayed to pt who verbalized understanding. She will double the dose of amitryptylline and let us know results. Pt saw Dr. Annabella Herrera/urology - pt will have surgery with her at some point.   Dr. Erick Acosta/dermatologist corey

## 2018-09-12 NOTE — TELEPHONE ENCOUNTER
Called DR. Lina Baires office and spoke with Rosette Lubin who sates that he received MRI results - she will bring it to his attention

## 2018-09-18 ENCOUNTER — TELEPHONE (OUTPATIENT)
Dept: INTERNAL MEDICINE CLINIC | Facility: CLINIC | Age: 83
End: 2018-09-18

## 2018-09-18 NOTE — TELEPHONE ENCOUNTER
Patient said it was okay to wait till Wednesday for this. Patient is still having difficulty sleeping. She has increased the dosage on her Amitripylline as instucted.     Today she didn't fall asleep till 9:00 AM this morning (for last night) & woke

## 2018-09-18 NOTE — TELEPHONE ENCOUNTER
Patient needs a refill on her Klor 10meq - daily - #90      Send to [address] [place] [address].

## 2018-09-20 RX ORDER — POTASSIUM CHLORIDE 750 MG/1
10 TABLET, EXTENDED RELEASE ORAL DAILY
Qty: 30 TABLET | Refills: 0 | OUTPATIENT
Start: 2018-09-20

## 2018-09-24 RX ORDER — TRAZODONE HYDROCHLORIDE 50 MG/1
50 TABLET ORAL NIGHTLY PRN
Qty: 30 TABLET | Refills: 1 | Status: SHIPPED | OUTPATIENT
Start: 2018-09-24 | End: 2018-11-24

## 2018-09-24 RX ORDER — POTASSIUM CHLORIDE 750 MG/1
10 TABLET, EXTENDED RELEASE ORAL DAILY
Qty: 90 TABLET | Refills: 3 | Status: SHIPPED | OUTPATIENT
Start: 2018-09-24 | End: 2019-07-30

## 2018-09-24 NOTE — TELEPHONE ENCOUNTER
I relayed Dr. Stu Mata message to patient and she verbalized understanding. She asked that nursing call her son because she does not drive and she cannot  the new script. I relayed the information to her son and he verbalized understanding.

## 2018-09-24 NOTE — TELEPHONE ENCOUNTER
Would stop the elavil. Would try trazodone instead [facility] Please have her let me know if this works.

## 2018-11-16 ENCOUNTER — TELEPHONE (OUTPATIENT)
Dept: INTERNAL MEDICINE CLINIC | Facility: CLINIC | Age: 83
End: 2018-11-16

## 2018-11-16 NOTE — TELEPHONE ENCOUNTER
Spoke to son Charles Teague who wasn't aware of this; he will let me know. Recommend follow up within the next month if I remain her PCP; otherwise will coordinate transfer of records.

## 2018-11-26 RX ORDER — TRAZODONE HYDROCHLORIDE 50 MG/1
TABLET ORAL
Qty: 30 TABLET | Refills: 1 | Status: SHIPPED | OUTPATIENT
Start: 2018-11-26 | End: 2018-12-05

## 2018-11-29 NOTE — TELEPHONE ENCOUNTER
To ----please call Hollie Young and set up Pre OP visit with Dr Kelvin Solis for next week    SHARITA Solis

## 2018-11-29 NOTE — TELEPHONE ENCOUNTER
Trina Mcdonnell is calling to inform Dr. Dia Torres that she will continue to be his mom's pcp. Pt. is scheduled to have surgery on 12/19 for her bladder to stop the leaking.  He wants to know if she needs to be seen for clearance paperwork will be faxed over from Dr. Izaiah Silva

## 2018-12-05 ENCOUNTER — OFFICE VISIT (OUTPATIENT)
Dept: INTERNAL MEDICINE CLINIC | Facility: CLINIC | Age: 83
End: 2018-12-05
Payer: MEDICARE

## 2018-12-05 VITALS
WEIGHT: 144 LBS | TEMPERATURE: 98 F | RESPIRATION RATE: 16 BRPM | OXYGEN SATURATION: 94 % | DIASTOLIC BLOOD PRESSURE: 56 MMHG | SYSTOLIC BLOOD PRESSURE: 124 MMHG | HEIGHT: 62 IN | BODY MASS INDEX: 26.5 KG/M2 | HEART RATE: 100 BPM

## 2018-12-05 DIAGNOSIS — E78.5 DYSLIPIDEMIA: ICD-10-CM

## 2018-12-05 DIAGNOSIS — I73.9 PERIPHERAL ARTERIAL DISEASE (HCC): ICD-10-CM

## 2018-12-05 DIAGNOSIS — R94.31 ABNORMAL EKG: ICD-10-CM

## 2018-12-05 DIAGNOSIS — Z23 NEED FOR VACCINATION: ICD-10-CM

## 2018-12-05 DIAGNOSIS — L29.9 ITCHING: ICD-10-CM

## 2018-12-05 DIAGNOSIS — Z01.818 PRE-OP EXAM: Primary | ICD-10-CM

## 2018-12-05 DIAGNOSIS — R05.9 COUGH: ICD-10-CM

## 2018-12-05 DIAGNOSIS — G31.84 MILD COGNITIVE IMPAIRMENT WITH MEMORY LOSS: ICD-10-CM

## 2018-12-05 DIAGNOSIS — N28.89 RENAL MASS: ICD-10-CM

## 2018-12-05 DIAGNOSIS — J44.9 CHRONIC OBSTRUCTIVE PULMONARY DISEASE, UNSPECIFIED COPD TYPE (HCC): ICD-10-CM

## 2018-12-05 PROCEDURE — 93010 ELECTROCARDIOGRAM REPORT: CPT | Performed by: INTERNAL MEDICINE

## 2018-12-05 PROCEDURE — G0463 HOSPITAL OUTPT CLINIC VISIT: HCPCS | Performed by: INTERNAL MEDICINE

## 2018-12-05 PROCEDURE — 99214 OFFICE O/P EST MOD 30 MIN: CPT | Performed by: INTERNAL MEDICINE

## 2018-12-05 PROCEDURE — 93005 ELECTROCARDIOGRAM TRACING: CPT | Performed by: INTERNAL MEDICINE

## 2018-12-05 RX ORDER — HYDROXYZINE HYDROCHLORIDE 25 MG/1
25 TABLET, FILM COATED ORAL 3 TIMES DAILY PRN
Qty: 30 TABLET | Refills: 3 | Status: SHIPPED | OUTPATIENT
Start: 2018-12-05 | End: 2019-02-22

## 2018-12-05 RX ORDER — CILOSTAZOL 100 MG/1
100 TABLET ORAL 2 TIMES DAILY
Qty: 180 TABLET | Refills: 3 | Status: ON HOLD | OUTPATIENT
Start: 2018-12-05 | End: 2019-05-02

## 2018-12-05 RX ORDER — SIMVASTATIN 20 MG
20 TABLET ORAL NIGHTLY
Qty: 90 TABLET | Refills: 3 | Status: SHIPPED | OUTPATIENT
Start: 2018-12-05 | End: 2019-03-27 | Stop reason: ALTCHOICE

## 2018-12-05 RX ORDER — ALENDRONATE SODIUM 70 MG/1
70 TABLET ORAL WEEKLY
Qty: 12 TABLET | Refills: 3 | Status: SHIPPED | OUTPATIENT
Start: 2018-12-05 | End: 2019-07-30

## 2018-12-05 NOTE — PROGRESS NOTES
Clau Cole is a 80year old female. Patient presents with:  Pre-Op Exam: 12/19/18 for bladder surgery      HPI:   Clau Cole is a 80year old female who presents for preoperative physical for midurethral sling and cystoscopy with Dr. Cheryl Ferguson kyphoplasty. She is on fosamax, vitamin D and calcium. Recently she has had more memory issues. She was evaluated by neurology. MRI brain shows chronic microvascular disease. She is a retired nurse, used to work at [employer].  She currently lives in a Diagnosis Date   • Arthritis    • Cancer [facility])     Kidney   • Compression fracture of L1 lumbar vertebra (HCC)    • DVT (deep venous thrombosis) ([facility] [address] 3/1/14    right leg   • Hyperlipidemia    • Lumbar spinal stenosis    • Lupus    • Macular degeneration midurethral sling with Dr. Spencer Lau on 12/19/18. EKG showing sinus rhythm with anterior T wave inversions which are new. I recommended patient be evaluated by cardiology for further risk stratification.  This was communicated to Dr. Spencer Lau, and for now proc shows chronic microvascular disease.              Jennie Malcolm DO

## 2018-12-11 ENCOUNTER — PRIOR ORIGINAL RECORDS (OUTPATIENT)
Dept: OTHER | Age: 83
End: 2018-12-11

## 2018-12-11 ENCOUNTER — HOSPITAL ENCOUNTER (OUTPATIENT)
Dept: GENERAL RADIOLOGY | Facility: HOSPITAL | Age: 83
Discharge: HOME | End: 2018-12-11
Attending: INTERNAL MEDICINE
Payer: MEDICARE

## 2018-12-11 ENCOUNTER — HOSPITAL ENCOUNTER (OUTPATIENT)
Dept: CV DIAGNOSTICS | Facility: HOSPITAL | Age: 83
Discharge: HOME | End: 2018-12-11
Attending: INTERNAL MEDICINE
Payer: MEDICARE

## 2018-12-11 ENCOUNTER — LAB ENCOUNTER (OUTPATIENT)
Dept: LAB | Facility: HOSPITAL | Age: 83
End: 2018-12-11
Attending: INTERNAL MEDICINE
Payer: MEDICARE

## 2018-12-11 DIAGNOSIS — R05.9 COUGH: ICD-10-CM

## 2018-12-11 DIAGNOSIS — Z01.818 PRE-OP EXAM: ICD-10-CM

## 2018-12-11 DIAGNOSIS — R94.31 ABNORMAL EKG: ICD-10-CM

## 2018-12-11 PROCEDURE — 93306 TTE W/DOPPLER COMPLETE: CPT | Performed by: INTERNAL MEDICINE

## 2018-12-11 PROCEDURE — 80053 COMPREHEN METABOLIC PANEL: CPT

## 2018-12-11 PROCEDURE — 36415 COLL VENOUS BLD VENIPUNCTURE: CPT

## 2018-12-11 PROCEDURE — 71046 X-RAY EXAM CHEST 2 VIEWS: CPT | Performed by: INTERNAL MEDICINE

## 2018-12-11 PROCEDURE — 85025 COMPLETE CBC W/AUTO DIFF WBC: CPT

## 2018-12-13 ENCOUNTER — TELEPHONE (OUTPATIENT)
Dept: INTERNAL MEDICINE CLINIC | Facility: CLINIC | Age: 83
End: 2018-12-13

## 2018-12-13 ENCOUNTER — APPOINTMENT (OUTPATIENT)
Dept: LAB | Facility: HOSPITAL | Age: 83
End: 2018-12-13
Attending: INTERNAL MEDICINE
Payer: MEDICARE

## 2018-12-13 DIAGNOSIS — Z01.818 PRE-OP EXAM: ICD-10-CM

## 2018-12-13 DIAGNOSIS — I73.9 PERIPHERAL ARTERIAL DISEASE (HCC): ICD-10-CM

## 2018-12-13 PROCEDURE — 93010 ELECTROCARDIOGRAM REPORT: CPT | Performed by: INTERNAL MEDICINE

## 2018-12-13 PROCEDURE — 93005 ELECTROCARDIOGRAM TRACING: CPT

## 2018-12-13 NOTE — TELEPHONE ENCOUNTER
Called patient's son Zafar Mota and relayed Dr Andrei Marte message to him. He states the reason they did the echo was because they were unable to get the ekg machine to work at [facility] office visit.  He states they tried three different EKG machines so Dr Frankie castro o

## 2018-12-13 NOTE — TELEPHONE ENCOUNTER
Please notify patient's son that we still need EKG (I did receive blood tests and echo results which were normal).

## 2018-12-13 NOTE — TELEPHONE ENCOUNTER
Called Rajendra and relayed Dr Nathan Daily message to him. He verbalized understanding. He will try to get Anton Stephanie back to the hospital to do the EKG as soon as he can.     SHARITA Mason

## 2018-12-13 NOTE — TELEPHONE ENCOUNTER
We still need an EKG-the 3 attempts were here in the office and I was hoping that the hospital might work better.

## 2018-12-14 ENCOUNTER — TELEPHONE (OUTPATIENT)
Dept: INTERNAL MEDICINE CLINIC | Facility: CLINIC | Age: 83
End: 2018-12-14

## 2018-12-14 NOTE — TELEPHONE ENCOUNTER
Spoke with Marilin Becker at Dr. Sharyle Bourbon office regarding my concerns; I have faxed a letter as such to Dr. Sharyle Bourbon office and left my cell phone in case Dr. Tory Edmonds would like to discuss.      I spoke with son Quinn Hu and have left a message for Luis Manuel Martinez to call me

## 2018-12-17 LAB
BILIRUBIN TOTAL: 0.7 MG/DL
BNP: 26 PMOL/L
BUN: 26 MG/DL
CALCIUM: 9.2 MG/DL
CHLORIDE: 105 MEQ/L
CHOLESTEROL, TOTAL: 235 MG/DL
CREATININE, SERUM: 1.15 MG/DL
GLUCOSE: 84 MG/DL
HDL CHOLESTEROL: 101 MG/DL
HEMATOCRIT: 45.4 %
HEMOGLOBIN: 15.2 G/DL
LDL CHOLESTEROL: 112 MG/DL
MAGNESIUM: 1.9 MG/DL
NON-HDL CHOLESTEROL: 134 MG/DL
PLATELETS: 163 K/UL
POTASSIUM, SERUM: 3.6 MEQ/L
PROTEIN, TOTAL: 6.6 G/DL
RED BLOOD COUNT: 4.75 X 10-6/U
SGOT (AST): 26 IU/L
SGPT (ALT): 16 IU/L
SODIUM: 138 MEQ/L
TRIGLYCERIDES: 108 MG/DL
WHITE BLOOD COUNT: 7.5 X 10-3/U

## 2018-12-18 ENCOUNTER — PRIOR ORIGINAL RECORDS (OUTPATIENT)
Dept: OTHER | Age: 83
End: 2018-12-18

## 2018-12-18 NOTE — TELEPHONE ENCOUNTER
To Dr. Mei Zavala - see below. Spoke with pt - she will call Dr. To Duty office to cancel surgery for tomorrow and ask her to call you to discuss.   Reviewed with pt that you have faxed letter to Dr. Fiona Bolaños as well which she will mention when she calls their

## 2018-12-18 NOTE — TELEPHONE ENCOUNTER
Pt had her EKG done today, Dr Rachid Leonard said she needs a Shayla Scan before she can have surgery  Pt is scheduled for surgery on 12/19, with Dr Latrell Lan  She would like the office to call Dr Latrell Lan and cancel her surgery, please call pt [phone]    Tasked to

## 2018-12-22 ENCOUNTER — HOSPITAL ENCOUNTER (OUTPATIENT)
Dept: NUCLEAR MEDICINE | Facility: HOSPITAL | Age: 83
End: 2018-12-22
Attending: INTERNAL MEDICINE
Payer: MEDICARE

## 2018-12-22 ENCOUNTER — HOSPITAL ENCOUNTER (OUTPATIENT)
Dept: CV DIAGNOSTICS | Facility: HOSPITAL | Age: 83
End: 2018-12-22
Attending: INTERNAL MEDICINE
Payer: MEDICARE

## 2018-12-24 ENCOUNTER — HOSPITAL ENCOUNTER (OUTPATIENT)
Dept: NUCLEAR MEDICINE | Facility: HOSPITAL | Age: 83
Discharge: HOME | End: 2018-12-24
Attending: INTERNAL MEDICINE
Payer: MEDICARE

## 2018-12-24 ENCOUNTER — HOSPITAL ENCOUNTER (OUTPATIENT)
Dept: CV DIAGNOSTICS | Facility: HOSPITAL | Age: 83
Discharge: HOME | End: 2018-12-24
Attending: INTERNAL MEDICINE
Payer: MEDICARE

## 2018-12-24 DIAGNOSIS — E78.00 HYPERCHOLESTEREMIA: ICD-10-CM

## 2018-12-24 DIAGNOSIS — Z01.810 PRE-OPERATIVE CARDIOVASCULAR EXAMINATION: ICD-10-CM

## 2018-12-24 DIAGNOSIS — R94.31 ABNORMAL EKG: ICD-10-CM

## 2018-12-24 PROCEDURE — A4216 STERILE WATER/SALINE, 10 ML: HCPCS

## 2018-12-24 PROCEDURE — 78452 HT MUSCLE IMAGE SPECT MULT: CPT | Performed by: INTERNAL MEDICINE

## 2018-12-24 PROCEDURE — 93016 CV STRESS TEST SUPVJ ONLY: CPT | Performed by: INTERNAL MEDICINE

## 2018-12-24 PROCEDURE — 93017 CV STRESS TEST TRACING ONLY: CPT | Performed by: INTERNAL MEDICINE

## 2018-12-24 PROCEDURE — 93018 CV STRESS TEST I&R ONLY: CPT | Performed by: INTERNAL MEDICINE

## 2018-12-24 RX ORDER — 0.9 % SODIUM CHLORIDE 0.9 %
VIAL (ML) INJECTION
Status: COMPLETED
Start: 2018-12-24 | End: 2018-12-24

## 2018-12-24 RX ADMIN — 0.9 % SODIUM CHLORIDE 10 ML: 0.9 % VIAL (ML) INJECTION at 09:43:00

## 2018-12-26 ENCOUNTER — MYAURORA ACCOUNT LINK (OUTPATIENT)
Dept: OTHER | Age: 83
End: 2018-12-26

## 2018-12-26 ENCOUNTER — PRIOR ORIGINAL RECORDS (OUTPATIENT)
Dept: OTHER | Age: 83
End: 2018-12-26

## 2018-12-27 ENCOUNTER — HOSPITAL ENCOUNTER (OUTPATIENT)
Dept: INTERVENTIONAL RADIOLOGY/VASCULAR | Facility: HOSPITAL | Age: 83
Discharge: HOME | End: 2018-12-27
Attending: INTERNAL MEDICINE | Admitting: INTERNAL MEDICINE
Payer: MEDICARE

## 2018-12-27 ENCOUNTER — PRIOR ORIGINAL RECORDS (OUTPATIENT)
Dept: OTHER | Age: 83
End: 2018-12-27

## 2018-12-27 VITALS
RESPIRATION RATE: 16 BRPM | WEIGHT: 140 LBS | BODY MASS INDEX: 25.76 KG/M2 | HEIGHT: 62 IN | OXYGEN SATURATION: 97 % | SYSTOLIC BLOOD PRESSURE: 120 MMHG | DIASTOLIC BLOOD PRESSURE: 69 MMHG | HEART RATE: 92 BPM

## 2018-12-27 DIAGNOSIS — R94.39 ABNORMAL NUCLEAR STRESS TEST: ICD-10-CM

## 2018-12-27 PROCEDURE — 93571 IV DOP VEL&/PRESS C FLO 1ST: CPT

## 2018-12-27 PROCEDURE — B2111ZZ FLUOROSCOPY OF MULTIPLE CORONARY ARTERIES USING LOW OSMOLAR CONTRAST: ICD-10-PCS | Performed by: INTERNAL MEDICINE

## 2018-12-27 PROCEDURE — 93458 L HRT ARTERY/VENTRICLE ANGIO: CPT

## 2018-12-27 PROCEDURE — 4A033BC MEASUREMENT OF ARTERIAL PRESSURE, CORONARY, PERCUTANEOUS APPROACH: ICD-10-PCS | Performed by: INTERNAL MEDICINE

## 2018-12-27 PROCEDURE — 4A023N7 MEASUREMENT OF CARDIAC SAMPLING AND PRESSURE, LEFT HEART, PERCUTANEOUS APPROACH: ICD-10-PCS | Performed by: INTERNAL MEDICINE

## 2018-12-27 PROCEDURE — 99152 MOD SED SAME PHYS/QHP 5/>YRS: CPT

## 2018-12-27 RX ORDER — MIDAZOLAM HYDROCHLORIDE 1 MG/ML
INJECTION INTRAMUSCULAR; INTRAVENOUS
Status: DISCONTINUED
Start: 2018-12-27 | End: 2018-12-27 | Stop reason: WASHOUT

## 2018-12-27 RX ORDER — SODIUM CHLORIDE 9 MG/ML
1 INJECTION, SOLUTION INTRAVENOUS CONTINUOUS
Status: DISCONTINUED | OUTPATIENT
Start: 2018-12-27 | End: 2018-12-27

## 2018-12-27 RX ORDER — MIDAZOLAM HYDROCHLORIDE 1 MG/ML
INJECTION INTRAMUSCULAR; INTRAVENOUS
Status: COMPLETED
Start: 2018-12-27 | End: 2018-12-27

## 2018-12-27 RX ORDER — SODIUM CHLORIDE 9 MG/ML
INJECTION, SOLUTION INTRAVENOUS
Status: COMPLETED
Start: 2018-12-27 | End: 2018-12-27

## 2018-12-27 RX ORDER — SODIUM CHLORIDE 9 MG/ML
INJECTION, SOLUTION INTRAVENOUS CONTINUOUS
Status: ACTIVE | OUTPATIENT
Start: 2018-12-27 | End: 2018-12-27

## 2018-12-27 RX ORDER — HEPARIN SODIUM 1000 [USP'U]/ML
INJECTION, SOLUTION INTRAVENOUS; SUBCUTANEOUS
Status: COMPLETED
Start: 2018-12-27 | End: 2018-12-27

## 2018-12-27 RX ORDER — LIDOCAINE HYDROCHLORIDE 20 MG/ML
INJECTION, SOLUTION EPIDURAL; INFILTRATION; INTRACAUDAL; PERINEURAL
Status: COMPLETED
Start: 2018-12-27 | End: 2018-12-27

## 2018-12-27 RX ADMIN — SODIUM CHLORIDE 3 ML/KG/HR: 9 INJECTION, SOLUTION INTRAVENOUS at 07:35:00

## 2018-12-27 RX ADMIN — SODIUM CHLORIDE: 9 INJECTION, SOLUTION INTRAVENOUS at 10:27:00

## 2018-12-27 NOTE — PROGRESS NOTES
Pt was able to drink, no nausea or vomiting. She ambulated after bedrest. Lt groin was soft and no hematoma. Discharge instructions given to pt and son.  She will follow up with Dr. Gulshan Diaz in 1 week

## 2018-12-27 NOTE — BRIEF PROCEDURE NOTE
[place] [facility]    Brief Cardiac Cath Note  Camila Arthur Patient Status:  Outpatient in a Bed    1934 MRN [id number]   Location [facility] Attending Anam Delgado, [address] [facility] Day # 0 PCP Tiara Rajput

## 2019-01-02 ENCOUNTER — TELEPHONE (OUTPATIENT)
Dept: INTERNAL MEDICINE CLINIC | Facility: CLINIC | Age: 84
End: 2019-01-02

## 2019-01-02 ENCOUNTER — PRIOR ORIGINAL RECORDS (OUTPATIENT)
Dept: OTHER | Age: 84
End: 2019-01-02

## 2019-01-02 NOTE — TELEPHONE ENCOUNTER
Pt. Would like to speak with Dr. Marlyn Levine regarding her clearance  Ph. # [phone]  Routed to clinical

## 2019-01-02 NOTE — TELEPHONE ENCOUNTER
Spoke with patient regarding clearance. Patient reported that she had cardiac testing done with Dr. Antonio Nesbitt, and that office told her they would get back to her with results by this Friday, 1/4/19.  Patient is requesting to know if Dr. Amari Canales has any additional

## 2019-01-04 NOTE — TELEPHONE ENCOUNTER
Please notify patient that her angiogram was ok; does show some coronary disease, but did not need any stent placement. I am faxing a note to Dr. Ofelia Sevilla office that we can proceed with surgery. She will need to call there and get scheduled.      Please a

## 2019-01-04 NOTE — TELEPHONE ENCOUNTER
Spoke to patient to notify her of MD message. Patient verbalized understanding of message and instructions. Also spoke with patient's son, Flavia Angeles (ok per HIPPA) to relay MD message and instructions.  Patient's son verbalized understanding of message and ins

## 2019-01-08 ENCOUNTER — PRIOR ORIGINAL RECORDS (OUTPATIENT)
Dept: OTHER | Age: 84
End: 2019-01-08

## 2019-01-16 ENCOUNTER — TELEPHONE (OUTPATIENT)
Dept: INTERNAL MEDICINE CLINIC | Facility: CLINIC | Age: 84
End: 2019-01-16

## 2019-01-16 NOTE — TELEPHONE ENCOUNTER
To  - they need signed and dated EKG, and it needs to be faxed to [phone] and [facility] at [phone]

## 2019-01-16 NOTE — TELEPHONE ENCOUNTER
Celeste Haider from Dr. Roxana Ortiz office called. She did receive the everything for the surgical clearance, however the EKG was not signed.   She is asking to refax the EKG with a signature and date to her at [phone] and also to Jeromy Zacarias at [phone]

## 2019-01-17 NOTE — TELEPHONE ENCOUNTER
Signed EKG faxed to Jeromy Zacarias and Lizette Montes at Dr. Angelic Puri office. Fax confirmation received for both locations.

## 2019-02-04 ENCOUNTER — HOSPITAL (OUTPATIENT)
Dept: OTHER | Age: 84
End: 2019-02-04

## 2019-02-05 ENCOUNTER — TELEPHONE (OUTPATIENT)
Dept: INTERNAL MEDICINE CLINIC | Facility: CLINIC | Age: 84
End: 2019-02-05

## 2019-02-05 ENCOUNTER — PRIOR ORIGINAL RECORDS (OUTPATIENT)
Dept: OTHER | Age: 84
End: 2019-02-05

## 2019-02-22 ENCOUNTER — TELEPHONE (OUTPATIENT)
Dept: INTERNAL MEDICINE CLINIC | Facility: CLINIC | Age: 84
End: 2019-02-22

## 2019-02-22 DIAGNOSIS — L29.9 ITCHING: ICD-10-CM

## 2019-02-22 RX ORDER — HYDROXYZINE HYDROCHLORIDE 25 MG/1
25 TABLET, FILM COATED ORAL 3 TIMES DAILY PRN
Qty: 30 TABLET | Refills: 3 | Status: SHIPPED | OUTPATIENT
Start: 2019-02-22 | End: 2019-03-08

## 2019-02-25 NOTE — TELEPHONE ENCOUNTER
[facility] pt have a new pharmacy pt need to contact her insurance for pharmacy information note in purple folder.

## 2019-02-28 VITALS
RESPIRATION RATE: 20 BRPM | SYSTOLIC BLOOD PRESSURE: 148 MMHG | BODY MASS INDEX: 26.31 KG/M2 | DIASTOLIC BLOOD PRESSURE: 80 MMHG | WEIGHT: 143 LBS | OXYGEN SATURATION: 92 % | HEART RATE: 96 BPM | HEIGHT: 62 IN

## 2019-02-28 VITALS
SYSTOLIC BLOOD PRESSURE: 138 MMHG | DIASTOLIC BLOOD PRESSURE: 72 MMHG | WEIGHT: 143 LBS | HEART RATE: 82 BPM | HEIGHT: 62 IN | BODY MASS INDEX: 26.31 KG/M2

## 2019-03-05 ENCOUNTER — TELEPHONE (OUTPATIENT)
Dept: CARDIOLOGY | Age: 84
End: 2019-03-05

## 2019-03-05 ENCOUNTER — HOSPITAL (OUTPATIENT)
Dept: OTHER | Age: 84
End: 2019-03-05

## 2019-03-05 ENCOUNTER — TELEPHONE (OUTPATIENT)
Dept: INTERNAL MEDICINE CLINIC | Facility: CLINIC | Age: 84
End: 2019-03-05

## 2019-03-05 NOTE — TELEPHONE ENCOUNTER
Dr Rafael Oneill called with message for Dr Tammi Melton. Pt had cardiac cath in Dec 2018  At most 60% stenosis. From cardiac standpoint is ok for surgery. Routed to Dr. Tammi Melton.

## 2019-03-05 NOTE — TELEPHONE ENCOUNTER
Spoke with Ananth Choudhary at [facility]. She reports confusion about Dr. Yen Espinoza clearance note. I relayed Dr. Bret Thomason documentation below.  Ananth Choudhary verbalized understanding, however, she states Dr. Yen Espinoza note states, Ping Ramirez

## 2019-03-06 ENCOUNTER — HOSPITAL (OUTPATIENT)
Dept: OTHER | Age: 84
End: 2019-03-06
Attending: OBSTETRICS & GYNECOLOGY

## 2019-03-06 ENCOUNTER — MED REC SCAN ONLY (OUTPATIENT)
Dept: INTERNAL MEDICINE CLINIC | Facility: CLINIC | Age: 84
End: 2019-03-06

## 2019-03-06 ENCOUNTER — HOSPITAL (OUTPATIENT)
Dept: OTHER | Age: 84
End: 2019-03-06

## 2019-03-06 NOTE — TELEPHONE ENCOUNTER
Medical clearance paperwork reviewed and signed by Dr. Jagdeep Bee. Faxed to Dr. Miranda Peterson ([phone]) as requested; fax confirmation received. Medical clearance paperwork sent to scanning.

## 2019-03-06 NOTE — PROGRESS NOTES
Medical clearance letter. Faxed to [phone] attn: Dr. Lorri Almonte; fax confirmation received. Copy to scanning.

## 2019-03-08 DIAGNOSIS — L29.9 ITCHING: ICD-10-CM

## 2019-03-08 RX ORDER — HYDROXYZINE HYDROCHLORIDE 25 MG/1
TABLET, FILM COATED ORAL
Qty: 90 TABLET | Refills: 0 | Status: SHIPPED | OUTPATIENT
Start: 2019-03-08 | End: 2019-06-06

## 2019-03-08 RX ORDER — AMITRIPTYLINE HYDROCHLORIDE 10 MG/1
TABLET, FILM COATED ORAL
Qty: 90 TABLET | OUTPATIENT
Start: 2019-03-08

## 2019-03-19 ENCOUNTER — TELEPHONE (OUTPATIENT)
Dept: INTERNAL MEDICINE CLINIC | Facility: CLINIC | Age: 84
End: 2019-03-19

## 2019-03-19 NOTE — TELEPHONE ENCOUNTER
She faxed over a referrall order for home health yesterday. Patient is requesting nursing & therapy, as she is trying to bounce back from a stent surgery that could not be done. Wanted to make sure we received it.

## 2019-03-25 NOTE — TELEPHONE ENCOUNTER
Aniyah Agarwal calling, she did get the fax. Now she is wondering if we can fax over the most recent office visit notes.      Fax # [phone]  Phone # [phone]

## 2019-03-27 ENCOUNTER — OFFICE VISIT (OUTPATIENT)
Dept: INTERNAL MEDICINE CLINIC | Facility: CLINIC | Age: 84
End: 2019-03-27
Payer: MEDICARE

## 2019-03-27 VITALS
SYSTOLIC BLOOD PRESSURE: 124 MMHG | WEIGHT: 140.19 LBS | OXYGEN SATURATION: 97 % | RESPIRATION RATE: 18 BRPM | BODY MASS INDEX: 25.8 KG/M2 | HEART RATE: 94 BPM | DIASTOLIC BLOOD PRESSURE: 64 MMHG | HEIGHT: 62 IN | TEMPERATURE: 97 F

## 2019-03-27 DIAGNOSIS — R53.81 PHYSICAL DECONDITIONING: Primary | ICD-10-CM

## 2019-03-27 DIAGNOSIS — I73.9 PAD (PERIPHERAL ARTERY DISEASE) (HCC): ICD-10-CM

## 2019-03-27 DIAGNOSIS — M48.062 SPINAL STENOSIS OF LUMBAR REGION WITH NEUROGENIC CLAUDICATION: ICD-10-CM

## 2019-03-27 PROCEDURE — G0463 HOSPITAL OUTPT CLINIC VISIT: HCPCS | Performed by: INTERNAL MEDICINE

## 2019-03-27 PROCEDURE — 99214 OFFICE O/P EST MOD 30 MIN: CPT | Performed by: INTERNAL MEDICINE

## 2019-03-27 RX ORDER — ATORVASTATIN CALCIUM 40 MG/1
40 TABLET, FILM COATED ORAL
Refills: 3 | COMMUNITY
Start: 2019-03-12 | End: 2019-05-28

## 2019-03-27 NOTE — PROGRESS NOTES
Elena Estrella is a 80year old female. Patient presents with: Follow - Up: 3 month follow up. Pt c/o chronic fatigue. Pt states she feels weak and that shes \"loosing her ability to walk. \" Requesting PT orders and has brought paperwork to [facility] mouth daily. Disp:  Rfl:    Calcium Carbonate-Vit D-Min (CALCIUM 1200 OR) Take 1,200 Units by mouth 2 (two) times daily. Disp:  Rfl:    aspirin 81 MG Oral Tab Take 1 tablet (81 mg total) by mouth daily.  Disp: 90 tablet Rfl: 3      Past Medical History: 25.64 kg/m²     GENERAL: well developed, well nourished, in no apparent distress  LUNGS: clear to auscultation bilaterally, no wheezing or rhonchi  CARDIO: RRR, normal S1S2, no gallops or murmurs  EXT: 3/5 strength in BLE.  Gait unsteady-improves with use o 2014, shortly after her laminectomy, and was treated for 6 months with Xarelto.      COPD  CT chest shows mild centrilobular COPD; she has significant smoking history, though she quit in 2013; has 45 pack-year history; improved on symbicort        Multiple

## 2019-03-28 ENCOUNTER — TELEPHONE (OUTPATIENT)
Dept: CARDIOLOGY | Age: 84
End: 2019-03-28

## 2019-03-28 NOTE — TELEPHONE ENCOUNTER
Face to face form filled out by Dr Miranda Villar and [facility] order faxed to [phone]. Fax confirmation received.    Face to face form to Stephanie Garcia

## 2019-04-01 ENCOUNTER — TELEPHONE (OUTPATIENT)
Dept: INTERNAL MEDICINE CLINIC | Facility: CLINIC | Age: 84
End: 2019-04-01

## 2019-04-01 NOTE — TELEPHONE ENCOUNTER
Missy Godoy [address] [place] [address] [phone] FNV#[phone] like the most current progress notes to be faxed to the. Please advise.

## 2019-04-03 ENCOUNTER — TELEPHONE (OUTPATIENT)
Dept: INTERNAL MEDICINE CLINIC | Facility: CLINIC | Age: 84
End: 2019-04-03

## 2019-04-03 NOTE — TELEPHONE ENCOUNTER
To Dr Екатерина Sanderson to please confirm----in patient's med list shows calcium 1200 mg daily---but is listed as historical med. Please confirm if this is correct for patient. Thank you.

## 2019-04-03 NOTE — TELEPHONE ENCOUNTER
Spoke to Blake with [employer] who reports that patient was taking a total of 2400mg of calcium supplementation daily. Relayed MD message and instructions to Blake.  Blake verbalized understanding of message and instruction, repeated back correct instructio

## 2019-04-03 NOTE — TELEPHONE ENCOUNTER
Fay from [employer]. Is calling pt. Was admitted for [facility]. Services yesterday and sh would like to confirm pt.'s calcium dose ph.  # [phone]  Routed to clinical

## 2019-04-04 RX ORDER — BUDESONIDE AND FORMOTEROL FUMARATE DIHYDRATE 160; 4.5 UG/1; UG/1
2 AEROSOL RESPIRATORY (INHALATION) DAILY
COMMUNITY

## 2019-04-04 RX ORDER — OXYBUTYNIN CHLORIDE 5 MG/1
5 TABLET, EXTENDED RELEASE ORAL 2 TIMES DAILY
COMMUNITY

## 2019-04-04 RX ORDER — CILOSTAZOL 100 MG/1
TABLET ORAL
COMMUNITY
End: 2019-05-08

## 2019-04-04 RX ORDER — FUROSEMIDE 20 MG/1
TABLET ORAL
COMMUNITY

## 2019-04-04 RX ORDER — ATORVASTATIN CALCIUM 40 MG/1
TABLET, FILM COATED ORAL
COMMUNITY
End: 2019-04-15 | Stop reason: SDUPTHER

## 2019-04-04 NOTE — TELEPHONE ENCOUNTER
Attempted to call [facility] 210.   She did not answer and I was unable to leave a  due to mailbox being full    Nursing to try again later

## 2019-04-10 RX ORDER — MULTIVIT-MIN/IRON FUM/FOLIC AC 7.5 MG-4
1 TABLET ORAL
COMMUNITY
End: 2019-05-08

## 2019-04-10 RX ORDER — SIMVASTATIN 20 MG
20 TABLET ORAL
COMMUNITY
End: 2019-05-08

## 2019-04-10 RX ORDER — ALENDRONATE SODIUM 70 MG/1
70 TABLET ORAL
COMMUNITY

## 2019-04-10 RX ORDER — AMITRIPTYLINE HYDROCHLORIDE 10 MG/1
10 TABLET, FILM COATED ORAL
COMMUNITY

## 2019-04-11 ENCOUNTER — TELEPHONE (OUTPATIENT)
Dept: INTERNAL MEDICINE CLINIC | Facility: CLINIC | Age: 84
End: 2019-04-11

## 2019-04-11 NOTE — TELEPHONE ENCOUNTER
[facility] Angela Vargas [phone]     Angela Vargas asking if  received the  Lipids results asking if there is any changes in the medication

## 2019-04-11 NOTE — TELEPHONE ENCOUNTER
To Dr. Brooks Loyd - see below - per Fay/[facility]:  Total= 232, triglycerides=71, UDL=114, DVR=081. Libby Vora aware you are out and will return next week.

## 2019-04-12 ENCOUNTER — OFFICE VISIT (OUTPATIENT)
Dept: CARDIOLOGY | Age: 84
End: 2019-04-12

## 2019-04-12 VITALS
HEART RATE: 88 BPM | WEIGHT: 135 LBS | HEIGHT: 62 IN | BODY MASS INDEX: 24.84 KG/M2 | DIASTOLIC BLOOD PRESSURE: 70 MMHG | SYSTOLIC BLOOD PRESSURE: 138 MMHG | OXYGEN SATURATION: 92 %

## 2019-04-12 DIAGNOSIS — I73.9 CLAUDICATION IN PERIPHERAL VASCULAR DISEASE (CMD): ICD-10-CM

## 2019-04-12 DIAGNOSIS — I10 HTN, GOAL BELOW 130/80: Primary | ICD-10-CM

## 2019-04-12 PROCEDURE — 99214 OFFICE O/P EST MOD 30 MIN: CPT | Performed by: INTERNAL MEDICINE

## 2019-04-12 RX ORDER — HYDROXYZINE HYDROCHLORIDE 25 MG/1
25 TABLET, FILM COATED ORAL PRN
COMMUNITY
Start: 2019-03-08

## 2019-04-12 RX ORDER — POTASSIUM CHLORIDE 750 MG/1
10 TABLET, EXTENDED RELEASE ORAL DAILY
COMMUNITY
Start: 2018-09-24

## 2019-04-12 SDOH — HEALTH STABILITY: PHYSICAL HEALTH: ON AVERAGE, HOW MANY MINUTES DO YOU ENGAGE IN EXERCISE AT THIS LEVEL?: 60 MIN

## 2019-04-12 SDOH — HEALTH STABILITY: MENTAL HEALTH
STRESS IS WHEN SOMEONE FEELS TENSE, NERVOUS, ANXIOUS, OR CAN'T SLEEP AT NIGHT BECAUSE THEIR MIND IS TROUBLED. HOW STRESSED ARE YOU?: ONLY A LITTLE

## 2019-04-12 SDOH — HEALTH STABILITY: PHYSICAL HEALTH: ON AVERAGE, HOW MANY DAYS PER WEEK DO YOU ENGAGE IN MODERATE TO STRENUOUS EXERCISE (LIKE A BRISK WALK)?: 2 DAYS

## 2019-04-15 RX ORDER — EZETIMIBE 10 MG/1
10 TABLET ORAL DAILY
Qty: 90 TABLET | Refills: 3 | Status: SHIPPED | OUTPATIENT
Start: 2019-04-15 | End: 2019-07-30

## 2019-04-15 RX ORDER — EZETIMIBE 10 MG/1
10 TABLET ORAL DAILY
Qty: 90 TABLET | Refills: 3 | Status: SHIPPED | OUTPATIENT
Start: 2019-04-15 | End: 2019-04-15

## 2019-04-15 RX ORDER — ATORVASTATIN CALCIUM 40 MG/1
40 TABLET, FILM COATED ORAL DAILY
Qty: 90 TABLET | Refills: 0 | Status: SHIPPED | OUTPATIENT
Start: 2019-04-15

## 2019-04-15 NOTE — TELEPHONE ENCOUNTER
Medication was [facility] to go to [facility] in [address] on [place].

## 2019-04-15 NOTE — TELEPHONE ENCOUNTER
Called Fay from [facility] and relayed message that RX was sent to local pharmacy - she should inform patient to cancel the mail order - verbalized understanding

## 2019-04-15 NOTE — TELEPHONE ENCOUNTER
Her cholesterol is not at goal; I would add a cholestesterol medication called zetia. Please notify Erica Stephenson, also please notify patient. With her significant PAD, we should get her LDL lower.

## 2019-04-15 NOTE — TELEPHONE ENCOUNTER
Called Fay from [facility] and relayed HARSHAD message - she verbalized understanding and will inform patient

## 2019-04-25 ENCOUNTER — TELEPHONE (OUTPATIENT)
Dept: INTERNAL MEDICINE CLINIC | Facility: CLINIC | Age: 84
End: 2019-04-25

## 2019-04-25 ENCOUNTER — HOSPITAL ENCOUNTER (OUTPATIENT)
Dept: GENERAL RADIOLOGY | Facility: HOSPITAL | Age: 84
Discharge: HOME | End: 2019-04-25
Attending: INTERNAL MEDICINE
Payer: MEDICARE

## 2019-04-25 ENCOUNTER — LAB ENCOUNTER (OUTPATIENT)
Dept: LAB | Facility: HOSPITAL | Age: 84
End: 2019-04-25
Attending: INTERNAL MEDICINE
Payer: MEDICARE

## 2019-04-25 DIAGNOSIS — I73.9 CLAUDICATION IN PERIPHERAL VASCULAR DISEASE (HCC): ICD-10-CM

## 2019-04-25 PROCEDURE — 80048 BASIC METABOLIC PNL TOTAL CA: CPT

## 2019-04-25 PROCEDURE — 71046 X-RAY EXAM CHEST 2 VIEWS: CPT | Performed by: INTERNAL MEDICINE

## 2019-04-25 PROCEDURE — 85025 COMPLETE CBC W/AUTO DIFF WBC: CPT

## 2019-04-25 PROCEDURE — 36415 COLL VENOUS BLD VENIPUNCTURE: CPT

## 2019-04-25 NOTE — TELEPHONE ENCOUNTER
FYI for Dr Mei Zavala from [facility]    Pt started Zetia last week & experienced some generalized weakness  Which as of now has resolved

## 2019-04-26 ENCOUNTER — TELEPHONE (OUTPATIENT)
Dept: CARDIOLOGY | Age: 84
End: 2019-04-26

## 2019-04-29 ENCOUNTER — HOSPITAL ENCOUNTER (OUTPATIENT)
Dept: INTERVENTIONAL RADIOLOGY/VASCULAR | Facility: HOSPITAL | Age: 84
Discharge: HOME | End: 2019-04-29
Attending: INTERNAL MEDICINE
Payer: MEDICARE

## 2019-04-29 ENCOUNTER — MED REC SCAN ONLY (OUTPATIENT)
Dept: INTERNAL MEDICINE CLINIC | Facility: CLINIC | Age: 84
End: 2019-04-29

## 2019-04-29 NOTE — PROGRESS NOTES
Physicians therapy referral/orders completed and faxed back to [facility]  @[phone], conformation received. Original sent to scan.

## 2019-05-01 ENCOUNTER — TELEPHONE (OUTPATIENT)
Dept: CARDIOLOGY | Age: 84
End: 2019-05-01

## 2019-05-01 RX ORDER — SODIUM CHLORIDE 9 MG/ML
INJECTION, SOLUTION INTRAVENOUS CONTINUOUS
Status: CANCELLED | OUTPATIENT
Start: 2019-05-02

## 2019-05-01 RX ORDER — ASPIRIN 81 MG/1
324 TABLET, CHEWABLE ORAL ONCE
Status: CANCELLED | OUTPATIENT
Start: 2019-05-01 | End: 2019-05-01

## 2019-05-02 ENCOUNTER — HOSPITAL ENCOUNTER (OUTPATIENT)
Dept: INTERVENTIONAL RADIOLOGY/VASCULAR | Facility: HOSPITAL | Age: 84
Discharge: HOME | End: 2019-05-02
Attending: INTERNAL MEDICINE | Admitting: INTERNAL MEDICINE
Payer: MEDICARE

## 2019-05-02 VITALS
OXYGEN SATURATION: 97 % | SYSTOLIC BLOOD PRESSURE: 130 MMHG | BODY MASS INDEX: 26 KG/M2 | RESPIRATION RATE: 19 BRPM | DIASTOLIC BLOOD PRESSURE: 74 MMHG | HEART RATE: 97 BPM | WEIGHT: 140 LBS

## 2019-05-02 DIAGNOSIS — I73.9 CLAUDICATION OF BOTH LOWER EXTREMITIES (HCC): ICD-10-CM

## 2019-05-02 PROCEDURE — 047N3ZZ DILATION OF LEFT POPLITEAL ARTERY, PERCUTANEOUS APPROACH: ICD-10-PCS | Performed by: INTERNAL MEDICINE

## 2019-05-02 PROCEDURE — 75710 ARTERY X-RAYS ARM/LEG: CPT

## 2019-05-02 PROCEDURE — 37226 HC TRANSLUM ANGIOPLASTY W STENT FEM POP UNILAT: CPT

## 2019-05-02 PROCEDURE — 047L3DZ DILATION OF LEFT FEMORAL ARTERY WITH INTRALUMINAL DEVICE, PERCUTANEOUS APPROACH: ICD-10-PCS | Performed by: INTERNAL MEDICINE

## 2019-05-02 PROCEDURE — 85347 COAGULATION TIME ACTIVATED: CPT

## 2019-05-02 PROCEDURE — 047U3ZZ DILATION OF LEFT PERONEAL ARTERY, PERCUTANEOUS APPROACH: ICD-10-PCS | Performed by: INTERNAL MEDICINE

## 2019-05-02 PROCEDURE — 99152 MOD SED SAME PHYS/QHP 5/>YRS: CPT

## 2019-05-02 PROCEDURE — 99153 MOD SED SAME PHYS/QHP EA: CPT

## 2019-05-02 PROCEDURE — 37228 HC TRANSLUMINAL ANGIO TIBIAL PERONEAL UNILAT INIT: CPT

## 2019-05-02 PROCEDURE — 75774 ARTERY X-RAY EACH VESSEL: CPT

## 2019-05-02 PROCEDURE — 75716 ARTERY X-RAYS ARMS/LEGS: CPT

## 2019-05-02 RX ORDER — SODIUM CHLORIDE 9 MG/ML
INJECTION, SOLUTION INTRAVENOUS
Status: COMPLETED
Start: 2019-05-02 | End: 2019-05-02

## 2019-05-02 RX ORDER — LIDOCAINE HYDROCHLORIDE 20 MG/ML
INJECTION, SOLUTION EPIDURAL; INFILTRATION; INTRACAUDAL; PERINEURAL
Status: COMPLETED
Start: 2019-05-02 | End: 2019-05-02

## 2019-05-02 RX ORDER — CLOPIDOGREL BISULFATE 75 MG/1
75 TABLET ORAL DAILY
Qty: 30 TABLET | Refills: 5 | Status: SHIPPED | OUTPATIENT
Start: 2019-05-02 | End: 2019-05-31

## 2019-05-02 RX ORDER — CLOPIDOGREL BISULFATE 75 MG/1
75 TABLET ORAL DAILY
Status: DISCONTINUED | OUTPATIENT
Start: 2019-05-03 | End: 2019-05-03

## 2019-05-02 RX ORDER — SODIUM CHLORIDE 9 MG/ML
75 INJECTION, SOLUTION INTRAVENOUS CONTINUOUS
Status: DISCONTINUED | OUTPATIENT
Start: 2019-05-02 | End: 2019-05-02

## 2019-05-02 RX ORDER — MIDAZOLAM HYDROCHLORIDE 1 MG/ML
INJECTION INTRAMUSCULAR; INTRAVENOUS
Status: COMPLETED
Start: 2019-05-02 | End: 2019-05-02

## 2019-05-02 RX ORDER — SODIUM CHLORIDE 0.9 % (FLUSH) 0.9 %
10 SYRINGE (ML) INJECTION AS NEEDED
Status: DISCONTINUED | OUTPATIENT
Start: 2019-05-02 | End: 2019-05-03

## 2019-05-02 RX ORDER — CLOPIDOGREL BISULFATE 75 MG/1
300 TABLET ORAL ONCE
Status: DISCONTINUED | OUTPATIENT
Start: 2019-05-02 | End: 2019-05-03

## 2019-05-02 RX ORDER — DIPHENHYDRAMINE HYDROCHLORIDE 50 MG/ML
INJECTION INTRAMUSCULAR; INTRAVENOUS
Status: COMPLETED
Start: 2019-05-02 | End: 2019-05-02

## 2019-05-02 RX ORDER — CLOPIDOGREL BISULFATE 75 MG/1
TABLET ORAL
Status: DISCONTINUED
Start: 2019-05-02 | End: 2019-05-02 | Stop reason: WASHOUT

## 2019-05-02 RX ORDER — CLOPIDOGREL BISULFATE 75 MG/1
TABLET ORAL
Status: COMPLETED
Start: 2019-05-02 | End: 2019-05-02

## 2019-05-02 RX ORDER — HEPARIN SODIUM 1000 [USP'U]/ML
INJECTION, SOLUTION INTRAVENOUS; SUBCUTANEOUS
Status: COMPLETED
Start: 2019-05-02 | End: 2019-05-02

## 2019-05-02 RX ORDER — SODIUM CHLORIDE 9 MG/ML
INJECTION, SOLUTION INTRAVENOUS
Status: DISCONTINUED
Start: 2019-05-02 | End: 2019-05-03

## 2019-05-02 NOTE — BRIEF PROCEDURE NOTE
[facility]    Brief Cath Note  Rosa Isela Collins Patient Status:  Outpatient in a Bed    1934 MRN [id number]   Location [facility] Attending Bere Dash MD   Hosp Day # 0 PCP Damaso Bustamante DO

## 2019-05-03 ENCOUNTER — TELEPHONE (OUTPATIENT)
Dept: CARDIOLOGY | Age: 84
End: 2019-05-03

## 2019-05-03 NOTE — PROGRESS NOTES
Shannon Poe  [id number]  5/2/2019    Pt is able to sit up and ambulate without difficulty. Pt voided and tolerated fluids/food. Pt's vital signs are stable. Procedural site remains dry and intact with good circulation, motion and sensation.  [place] [employer]

## 2019-05-06 ENCOUNTER — TELEPHONE (OUTPATIENT)
Dept: INTERNAL MEDICINE CLINIC | Facility: CLINIC | Age: 84
End: 2019-05-06

## 2019-05-06 ENCOUNTER — TELEPHONE (OUTPATIENT)
Dept: CARDIOLOGY | Age: 84
End: 2019-05-06

## 2019-05-06 NOTE — TELEPHONE ENCOUNTER
She should be taking zetia in addition to the lipitor to try to get her cholesterol down to avoid further plaque from building up. For the R groin pain-was she able to get through to Dr. Leonarda Goff?

## 2019-05-06 NOTE — TELEPHONE ENCOUNTER
Patient calling. Had procedure done with Dr. Jacinta Schwarz on 5/2. Has questions regarding her medications. Having pain on right side groin pain. Has been changing bandage and cleaning area as directed.   Tried calling Dr. Jacinta Schwarz office, can't go through to

## 2019-05-06 NOTE — TELEPHONE ENCOUNTER
Spoke with patient at her home number [phone]  I relayed Dr Ruth Miller message to her. She verbalized understanding. She will have her son go  the medication.   She was able to speak to a nurse at Dr Noah Deutsch office today and they were able to help h

## 2019-05-06 NOTE — TELEPHONE ENCOUNTER
Called Dr Cierra Boss office [phone]. I notified them that patient attempted to call but was unable to get through. Asked if one of their nurses could please call patient. She told me she would have a triage nurse call patient.     I called patient and n

## 2019-05-07 PROBLEM — Z98.62 S/P PERIPHERAL ARTERY ANGIOPLASTY: Status: ACTIVE | Noted: 2019-05-07

## 2019-05-07 PROBLEM — J44.9 COPD (CHRONIC OBSTRUCTIVE PULMONARY DISEASE) (CMD): Status: ACTIVE | Noted: 2018-12-18

## 2019-05-07 PROBLEM — I73.9 PERIPHERAL ARTERY DISEASE (CMD): Status: ACTIVE | Noted: 2019-05-07

## 2019-05-07 RX ORDER — CLOPIDOGREL BISULFATE 75 MG/1
75 TABLET ORAL DAILY
COMMUNITY
Start: 2019-05-02

## 2019-05-07 RX ORDER — EZETIMIBE 10 MG/1
10 TABLET ORAL DAILY
COMMUNITY
Start: 2019-04-15 | End: 2020-04-09

## 2019-05-08 ENCOUNTER — APPOINTMENT (OUTPATIENT)
Dept: CARDIOLOGY | Age: 84
End: 2019-05-08

## 2019-05-08 ENCOUNTER — OFFICE VISIT (OUTPATIENT)
Dept: CARDIOLOGY | Age: 84
End: 2019-05-08

## 2019-05-08 VITALS
HEIGHT: 62 IN | DIASTOLIC BLOOD PRESSURE: 64 MMHG | WEIGHT: 140 LBS | BODY MASS INDEX: 25.76 KG/M2 | SYSTOLIC BLOOD PRESSURE: 108 MMHG | HEART RATE: 68 BPM | OXYGEN SATURATION: 97 %

## 2019-05-08 DIAGNOSIS — I73.9 CLAUDICATION IN PERIPHERAL VASCULAR DISEASE (CMD): ICD-10-CM

## 2019-05-08 DIAGNOSIS — I73.9 PERIPHERAL ARTERY DISEASE (CMD): Primary | ICD-10-CM

## 2019-05-08 DIAGNOSIS — I10 HTN, GOAL BELOW 130/80: ICD-10-CM

## 2019-05-08 PROBLEM — I25.10 CORONARY ARTERY DISEASE INVOLVING NATIVE CORONARY ARTERY OF NATIVE HEART WITHOUT ANGINA PECTORIS: Status: ACTIVE | Noted: 2019-05-08

## 2019-05-08 PROCEDURE — 99214 OFFICE O/P EST MOD 30 MIN: CPT | Performed by: NURSE PRACTITIONER

## 2019-05-10 ENCOUNTER — TELEPHONE (OUTPATIENT)
Dept: INTERNAL MEDICINE CLINIC | Facility: CLINIC | Age: 84
End: 2019-05-10

## 2019-05-10 RX ORDER — BUDESONIDE AND FORMOTEROL FUMARATE DIHYDRATE 80; 4.5 UG/1; UG/1
2 AEROSOL RESPIRATORY (INHALATION) 2 TIMES DAILY
Qty: 3 INHALER | Refills: 3 | Status: SHIPPED | OUTPATIENT
Start: 2019-05-10 | End: 2019-05-20

## 2019-05-10 RX ORDER — AMITRIPTYLINE HYDROCHLORIDE 10 MG/1
10 TABLET, FILM COATED ORAL NIGHTLY
Qty: 90 TABLET | Refills: 3 | Status: SHIPPED | OUTPATIENT
Start: 2019-05-10 | End: 2019-05-20

## 2019-05-10 NOTE — TELEPHONE ENCOUNTER
Pt called to request refills for   Symbicort Inhaler 80/4.5   & Amitriptyline 10 mg    Please send to mail order pharmacy [facility]  Pt can be reached at [phone]

## 2019-05-10 NOTE — TELEPHONE ENCOUNTER
Neither medication on current medication list and not mentioned in last OV note 3/27/19. Routed to Dr. Alivia Meadows for review.

## 2019-05-20 ENCOUNTER — OFFICE VISIT (OUTPATIENT)
Dept: INTERNAL MEDICINE CLINIC | Facility: CLINIC | Age: 84
End: 2019-05-20
Payer: MEDICARE

## 2019-05-20 VITALS
HEIGHT: 62 IN | OXYGEN SATURATION: 98 % | DIASTOLIC BLOOD PRESSURE: 76 MMHG | TEMPERATURE: 98 F | BODY MASS INDEX: 24.84 KG/M2 | WEIGHT: 135 LBS | HEART RATE: 69 BPM | SYSTOLIC BLOOD PRESSURE: 122 MMHG

## 2019-05-20 DIAGNOSIS — I73.9 PAD (PERIPHERAL ARTERY DISEASE) (HCC): ICD-10-CM

## 2019-05-20 DIAGNOSIS — J44.9 CHRONIC OBSTRUCTIVE PULMONARY DISEASE, UNSPECIFIED COPD TYPE (HCC): ICD-10-CM

## 2019-05-20 DIAGNOSIS — N28.89 RENAL MASS: Primary | ICD-10-CM

## 2019-05-20 DIAGNOSIS — G31.84 MILD COGNITIVE IMPAIRMENT WITH MEMORY LOSS: ICD-10-CM

## 2019-05-20 PROCEDURE — G0463 HOSPITAL OUTPT CLINIC VISIT: HCPCS | Performed by: INTERNAL MEDICINE

## 2019-05-20 PROCEDURE — 99214 OFFICE O/P EST MOD 30 MIN: CPT | Performed by: INTERNAL MEDICINE

## 2019-05-20 RX ORDER — AMITRIPTYLINE HYDROCHLORIDE 10 MG/1
10 TABLET, FILM COATED ORAL NIGHTLY
Qty: 90 TABLET | Refills: 3 | Status: SHIPPED | OUTPATIENT
Start: 2019-05-20 | End: 2019-06-07

## 2019-05-20 RX ORDER — BUDESONIDE AND FORMOTEROL FUMARATE DIHYDRATE 80; 4.5 UG/1; UG/1
2 AEROSOL RESPIRATORY (INHALATION) 2 TIMES DAILY
Qty: 3 INHALER | Refills: 3 | Status: SHIPPED | OUTPATIENT
Start: 2019-05-20 | End: 2019-06-07

## 2019-05-20 NOTE — PROGRESS NOTES
Bry Limon is a 80year old female. Patient presents with:  Checkup: Pt states she is losing her mind, finding it hard to find words x 2-3 months .  Angioplasty 2 weeks ago w/ Dr. Nathaniel Marin   Medication Request: pended       HPI:   Wallace Morgan tablet Rfl: 0   Cholecalciferol (VITAMIN D) 1000 units Oral Tab Take 1 tablet by mouth daily. Disp:  Rfl:    Oxybutynin Chloride ER 5 MG Oral Tablet 24 Hr Take 10 mg by mouth daily.    Disp:  Rfl:    Calcium Carbonate-Vit D-Min (CALCIUM 1200 OR) Take 1,200 chest pain, pressure, or palpitations  EXT: denies calf pain  NEURO: reports memory impairment      EXAM:   /76 (BP Location: Right arm, Patient Position: Sitting, Cuff Size: adult)   Pulse 69   Temp 98.1 °F (36.7 °C) (Oral)   Ht 5' 2\" (1.575 m)   W DVT- She had a DVT in 2014, shortly after her laminectomy, and was treated for 6 months with Xarelto.       COPD  CT chest shows mild centrilobular COPD; she has significant smoking history, though she quit in 2013; has 45 pack-year history; improved on sym

## 2019-05-24 ENCOUNTER — OFFICE VISIT (OUTPATIENT)
Dept: CARDIOLOGY | Age: 84
End: 2019-05-24

## 2019-05-24 VITALS
OXYGEN SATURATION: 97 % | DIASTOLIC BLOOD PRESSURE: 76 MMHG | BODY MASS INDEX: 25.95 KG/M2 | HEART RATE: 86 BPM | SYSTOLIC BLOOD PRESSURE: 136 MMHG | WEIGHT: 141 LBS | HEIGHT: 62 IN

## 2019-05-24 DIAGNOSIS — I73.9 CLAUDICATION IN PERIPHERAL VASCULAR DISEASE (CMD): Primary | ICD-10-CM

## 2019-05-24 DIAGNOSIS — Z98.62 S/P PERIPHERAL ARTERY ANGIOPLASTY: ICD-10-CM

## 2019-05-24 PROCEDURE — 99214 OFFICE O/P EST MOD 30 MIN: CPT | Performed by: INTERNAL MEDICINE

## 2019-05-29 NOTE — TELEPHONE ENCOUNTER
Refill request received for atorvastatin 40mg daily. On pt's med list as External/ Patient, reported. Does not appear to have been prescribed through this office in quite some time.      Per Dr. Devika Gill last office visit note 5/20/19:  Hypercholesterolemia

## 2019-05-31 RX ORDER — FUROSEMIDE 20 MG/1
20 TABLET ORAL DAILY
Qty: 90 TABLET | Refills: 0 | Status: SHIPPED | OUTPATIENT
Start: 2019-05-31 | End: 2019-06-07

## 2019-05-31 RX ORDER — CLOPIDOGREL BISULFATE 75 MG/1
75 TABLET ORAL DAILY
Qty: 90 TABLET | Refills: 3 | Status: SHIPPED | OUTPATIENT
Start: 2019-05-31 | End: 2019-07-30

## 2019-05-31 RX ORDER — ATORVASTATIN CALCIUM 40 MG/1
40 TABLET, FILM COATED ORAL
Qty: 90 TABLET | Refills: 3 | Status: SHIPPED | OUTPATIENT
Start: 2019-05-31 | End: 2019-06-07

## 2019-06-04 ENCOUNTER — APPOINTMENT (OUTPATIENT)
Dept: CT IMAGING | Facility: HOSPITAL | Age: 84
End: 2019-06-04
Attending: EMERGENCY MEDICINE
Payer: MEDICARE

## 2019-06-04 ENCOUNTER — APPOINTMENT (OUTPATIENT)
Dept: GENERAL RADIOLOGY | Facility: HOSPITAL | Age: 84
End: 2019-06-04
Attending: EMERGENCY MEDICINE
Payer: MEDICARE

## 2019-06-04 ENCOUNTER — HOSPITAL ENCOUNTER (EMERGENCY)
Facility: HOSPITAL | Age: 84
Discharge: HOME | End: 2019-06-04
Attending: EMERGENCY MEDICINE
Payer: MEDICARE

## 2019-06-04 ENCOUNTER — TELEPHONE (OUTPATIENT)
Dept: INTERNAL MEDICINE CLINIC | Facility: CLINIC | Age: 84
End: 2019-06-04

## 2019-06-04 VITALS
BODY MASS INDEX: 26.31 KG/M2 | WEIGHT: 143 LBS | HEIGHT: 62 IN | SYSTOLIC BLOOD PRESSURE: 121 MMHG | DIASTOLIC BLOOD PRESSURE: 68 MMHG | OXYGEN SATURATION: 95 % | TEMPERATURE: 98 F | RESPIRATION RATE: 20 BRPM | HEART RATE: 88 BPM

## 2019-06-04 DIAGNOSIS — J44.1 COPD EXACERBATION (HCC): Primary | ICD-10-CM

## 2019-06-04 PROCEDURE — 94640 AIRWAY INHALATION TREATMENT: CPT

## 2019-06-04 PROCEDURE — 83880 ASSAY OF NATRIURETIC PEPTIDE: CPT | Performed by: EMERGENCY MEDICINE

## 2019-06-04 PROCEDURE — 80048 BASIC METABOLIC PNL TOTAL CA: CPT | Performed by: EMERGENCY MEDICINE

## 2019-06-04 PROCEDURE — 99285 EMERGENCY DEPT VISIT HI MDM: CPT

## 2019-06-04 PROCEDURE — 80048 BASIC METABOLIC PNL TOTAL CA: CPT

## 2019-06-04 PROCEDURE — 70450 CT HEAD/BRAIN W/O DYE: CPT | Performed by: EMERGENCY MEDICINE

## 2019-06-04 PROCEDURE — 96374 THER/PROPH/DIAG INJ IV PUSH: CPT

## 2019-06-04 PROCEDURE — 84484 ASSAY OF TROPONIN QUANT: CPT | Performed by: EMERGENCY MEDICINE

## 2019-06-04 PROCEDURE — 71046 X-RAY EXAM CHEST 2 VIEWS: CPT | Performed by: EMERGENCY MEDICINE

## 2019-06-04 PROCEDURE — 85025 COMPLETE CBC W/AUTO DIFF WBC: CPT | Performed by: EMERGENCY MEDICINE

## 2019-06-04 PROCEDURE — 93010 ELECTROCARDIOGRAM REPORT: CPT | Performed by: EMERGENCY MEDICINE

## 2019-06-04 PROCEDURE — 85025 COMPLETE CBC W/AUTO DIFF WBC: CPT

## 2019-06-04 PROCEDURE — 93005 ELECTROCARDIOGRAM TRACING: CPT

## 2019-06-04 RX ORDER — IPRATROPIUM BROMIDE AND ALBUTEROL SULFATE 2.5; .5 MG/3ML; MG/3ML
3 SOLUTION RESPIRATORY (INHALATION) ONCE
Status: COMPLETED | OUTPATIENT
Start: 2019-06-04 | End: 2019-06-04

## 2019-06-04 RX ORDER — ALBUTEROL SULFATE 90 UG/1
2 AEROSOL, METERED RESPIRATORY (INHALATION) EVERY 4 HOURS PRN
Qty: 1 INHALER | Refills: 0 | Status: SHIPPED | OUTPATIENT
Start: 2019-06-04 | End: 2019-06-14

## 2019-06-04 RX ORDER — INHALER, ASSIST DEVICES
SPACER (EA) MISCELLANEOUS
Qty: 1 EACH | Refills: 0 | Status: SHIPPED | OUTPATIENT
Start: 2019-06-04

## 2019-06-04 RX ORDER — BUDESONIDE AND FORMOTEROL FUMARATE DIHYDRATE 160; 4.5 UG/1; UG/1
2 AEROSOL RESPIRATORY (INHALATION) 2 TIMES DAILY
Qty: 1 INHALER | Refills: 0 | Status: SHIPPED | OUTPATIENT
Start: 2019-06-04 | End: 2019-06-07

## 2019-06-04 RX ORDER — ALBUTEROL SULFATE 2.5 MG/3ML
2.5 SOLUTION RESPIRATORY (INHALATION) ONCE
Status: COMPLETED | OUTPATIENT
Start: 2019-06-04 | End: 2019-06-04

## 2019-06-04 RX ORDER — METHYLPREDNISOLONE SODIUM SUCCINATE 125 MG/2ML
125 INJECTION, POWDER, LYOPHILIZED, FOR SOLUTION INTRAMUSCULAR; INTRAVENOUS ONCE
Status: COMPLETED | OUTPATIENT
Start: 2019-06-04 | End: 2019-06-04

## 2019-06-04 RX ORDER — PREDNISONE 20 MG/1
60 TABLET ORAL DAILY
Qty: 15 TABLET | Refills: 0 | Status: SHIPPED | OUTPATIENT
Start: 2019-06-04 | End: 2019-06-09

## 2019-06-04 NOTE — TELEPHONE ENCOUNTER
Pt is calling she is so sick she is coughing, she can't bring up any phleghm, difficulty breathing  Terrible headache she has never had a headache before  Pt would [facility] [place],  Please call pt [phone] or son Danielabdulkadir Spencer 6

## 2019-06-04 NOTE — ED PROVIDER NOTES
Patient Seen in: [facility] AND [facility] Emergency Department    History   Patient presents with:  Headache (neurologic)    Stated Complaint:     HPI    66-year-old female patient presents complaining of 3 days cough associated with shortness of breath.   Is no Systems    Positive for stated complaint:   Other systems are as noted in HPI. Constitutional and vital signs reviewed. All other systems reviewed and negative except as noted above.     Physical Exam     ED Triage Vitals [06/04/19 1757]   /72 Status                     ---------                               -----------         ------                     CBC W/ DIFFERENTIAL[[id number]]          Abnormal            Final result                 Please view results for these tests on the individual

## 2019-06-04 NOTE — ED INITIAL ASSESSMENT (HPI)
Pt with difficulty breathing, severe HA, and having hard time walking- normally uses walker, now barely can walk. Breathing normal and unlabored. Family would like to make sure pt does not have pneumonia.

## 2019-06-04 NOTE — TELEPHONE ENCOUNTER
To Dr. Amari Canales - see below - per son: sx for 3 days - advised ER, understanding verbalized. Nursing to f/u.

## 2019-06-05 NOTE — TELEPHONE ENCOUNTER
Spoke to pt son as pt number went to a fast busy when dialed  He said pt was discharged last night, he was very unhappy about that. Her oxygen saturation was 88.   When pt was due to be discharged they were recommending a cab  as no family was there at the

## 2019-06-05 NOTE — CM/SW NOTE
Called by DANIELLE Olivier to speak with patient's daughter-in-law as they are upset patient now not being admitted as patient's son was here at bedside with patient and Dr. Martini informed son patient would be being admitted, however pt's PCP did not feel patient ne

## 2019-06-05 NOTE — ED NOTES
Patient provided discharge instructions. Patient provided with instructions of how and when to follow up with healthcare providers. Patient provided instructions of when to seek emergency care. Paper prescriptions from ER physician provided to patient.  Mandy Lentz

## 2019-06-05 NOTE — TELEPHONE ENCOUNTER
Spoke with son; he explained what happened yesterday. He is concerned that mom is weak. He will check on her tonight and tomorrow and let me know how she is doing. I agree with the prednisone and the albuterol. Her cxr did not show evidence of pneumonia.

## 2019-06-06 ENCOUNTER — TELEPHONE (OUTPATIENT)
Dept: INTERNAL MEDICINE CLINIC | Facility: CLINIC | Age: 84
End: 2019-06-06

## 2019-06-06 DIAGNOSIS — L29.9 ITCHING: ICD-10-CM

## 2019-06-06 NOTE — TELEPHONE ENCOUNTER
Pt son Jessica Pinion called for Dr Pj Mason, pt is doing a bit better  Please call with any questions at [phone]  Tasked to Dr Pj Mason

## 2019-06-06 NOTE — TELEPHONE ENCOUNTER
[facility] [place] she need all these send ASAP pt stated she is having lots of issues with her mail order and she is out of almost everything

## 2019-06-07 RX ORDER — ATORVASTATIN CALCIUM 40 MG/1
40 TABLET, FILM COATED ORAL
Qty: 90 TABLET | Refills: 3 | Status: SHIPPED | OUTPATIENT
Start: 2019-06-07 | End: 2019-07-30

## 2019-06-07 RX ORDER — FUROSEMIDE 20 MG/1
20 TABLET ORAL DAILY
Qty: 30 TABLET | Refills: 0 | Status: SHIPPED | OUTPATIENT
Start: 2019-06-07 | End: 2019-07-30

## 2019-06-07 RX ORDER — AMITRIPTYLINE HYDROCHLORIDE 10 MG/1
10 TABLET, FILM COATED ORAL NIGHTLY
Qty: 90 TABLET | Refills: 3 | Status: SHIPPED | OUTPATIENT
Start: 2019-06-07 | End: 2019-07-30

## 2019-06-07 RX ORDER — HYDROXYZINE HYDROCHLORIDE 25 MG/1
TABLET, FILM COATED ORAL
Qty: 90 TABLET | Refills: 3 | Status: SHIPPED | OUTPATIENT
Start: 2019-06-07 | End: 2020-07-15

## 2019-06-07 RX ORDER — BUDESONIDE AND FORMOTEROL FUMARATE DIHYDRATE 160; 4.5 UG/1; UG/1
2 AEROSOL RESPIRATORY (INHALATION) 2 TIMES DAILY
Qty: 3 INHALER | Refills: 3 | Status: SHIPPED | OUTPATIENT
Start: 2019-06-07 | End: 2019-07-30

## 2019-06-07 RX ORDER — EZETIMIBE 10 MG/1
10 TABLET ORAL DAILY
Qty: 90 TABLET | Refills: 3 | OUTPATIENT
Start: 2019-06-07 | End: 2020-06-01

## 2019-06-07 RX ORDER — OXYBUTYNIN CHLORIDE 5 MG/1
10 TABLET, EXTENDED RELEASE ORAL DAILY
Refills: 0 | OUTPATIENT
Start: 2019-06-07

## 2019-06-07 NOTE — TELEPHONE ENCOUNTER
SHARITA to Dr. Brooks Loyd----    Reviewed with [place]; ok for refill. Furosamide #[facility] on 5/29/19). Pt's crt 6/4/19= 1.39.       Refill request is for a maintenance medication and has met the criteria specified in the Ambul at this practice

## 2019-06-07 NOTE — TELEPHONE ENCOUNTER
To osvaldo for review----    Multiple requests from multiple pharmacies. Spoke to pt for clarification on medications as pt has 1 year supplies available with [facility].  Pt reports that [facility] is refusing to send her prescriptions due to billing complica

## 2019-06-07 NOTE — TELEPHONE ENCOUNTER
Pt called to check status on refills  Needs medication for tomorrow morning   Please send [facility] local [facility] [place]   Is out of furosemide     Pt can be reached at [phone] if there are any questions  Or call her son Constance Velez [phone]

## 2019-07-03 ENCOUNTER — TELEPHONE (OUTPATIENT)
Dept: INTERNAL MEDICINE CLINIC | Facility: CLINIC | Age: 84
End: 2019-07-03

## 2019-07-03 DIAGNOSIS — M48.062 SPINAL STENOSIS OF LUMBAR REGION WITH NEUROGENIC CLAUDICATION: Primary | ICD-10-CM

## 2019-07-03 DIAGNOSIS — I73.9 PAD (PERIPHERAL ARTERY DISEASE) (HCC): ICD-10-CM

## 2019-07-03 NOTE — TELEPHONE ENCOUNTER
Order faxed to [facility] at [phone]. Fax confirmation received and sent to scanning. Patient notified and she verbalized understanding.

## 2019-07-03 NOTE — TELEPHONE ENCOUNTER
Pt. Will start PT with [facility] on 07/08 they needs a letter from Dr. Debara Bosworth stating why she needs PT. Ph. # [phone]   [facility] ph. # [phone]  Fax.  # [phone]   Routed to clinical

## 2019-07-07 ENCOUNTER — HOSPITAL ENCOUNTER (OUTPATIENT)
Dept: MRI IMAGING | Facility: HOSPITAL | Age: 84
Discharge: HOME | End: 2019-07-07
Attending: INTERNAL MEDICINE
Payer: MEDICARE

## 2019-07-07 DIAGNOSIS — N28.89 RENAL MASS: ICD-10-CM

## 2019-07-07 PROCEDURE — 74183 MRI ABD W/O CNTR FLWD CNTR: CPT | Performed by: INTERNAL MEDICINE

## 2019-07-07 PROCEDURE — A9575 INJ GADOTERATE MEGLUMI 0.1ML: HCPCS | Performed by: INTERNAL MEDICINE

## 2019-07-08 ENCOUNTER — TELEPHONE (OUTPATIENT)
Dept: INTERNAL MEDICINE CLINIC | Facility: CLINIC | Age: 84
End: 2019-07-08

## 2019-07-08 NOTE — TELEPHONE ENCOUNTER
Patient had to cancel her appointment for tomorrow as she can't find any kind of transportation. She will reschedule whenever her son can bring her. Patient wanted Dr. Jesi Mckeon to know this information.

## 2019-07-10 ENCOUNTER — TELEPHONE (OUTPATIENT)
Dept: INTERNAL MEDICINE CLINIC | Facility: CLINIC | Age: 84
End: 2019-07-10

## 2019-07-30 ENCOUNTER — OFFICE VISIT (OUTPATIENT)
Dept: INTERNAL MEDICINE CLINIC | Facility: CLINIC | Age: 84
End: 2019-07-30
Payer: MEDICARE

## 2019-07-30 VITALS
RESPIRATION RATE: 12 BRPM | HEIGHT: 67 IN | WEIGHT: 142 LBS | DIASTOLIC BLOOD PRESSURE: 70 MMHG | SYSTOLIC BLOOD PRESSURE: 118 MMHG | BODY MASS INDEX: 22.29 KG/M2 | HEART RATE: 95 BPM | TEMPERATURE: 98 F | OXYGEN SATURATION: 97 %

## 2019-07-30 DIAGNOSIS — E78.5 DYSLIPIDEMIA: ICD-10-CM

## 2019-07-30 DIAGNOSIS — Z00.00 ENCOUNTER FOR ANNUAL HEALTH EXAMINATION: Primary | ICD-10-CM

## 2019-07-30 DIAGNOSIS — J44.9 CHRONIC OBSTRUCTIVE PULMONARY DISEASE, UNSPECIFIED COPD TYPE (HCC): ICD-10-CM

## 2019-07-30 DIAGNOSIS — Z78.0 POSTMENOPAUSAL: ICD-10-CM

## 2019-07-30 DIAGNOSIS — R53.83 FATIGUE, UNSPECIFIED TYPE: ICD-10-CM

## 2019-07-30 DIAGNOSIS — I73.9 PAD (PERIPHERAL ARTERY DISEASE) (HCC): ICD-10-CM

## 2019-07-30 DIAGNOSIS — N28.89 RENAL MASS: ICD-10-CM

## 2019-07-30 PROCEDURE — G0439 PPPS, SUBSEQ VISIT: HCPCS | Performed by: INTERNAL MEDICINE

## 2019-07-30 RX ORDER — AMITRIPTYLINE HYDROCHLORIDE 10 MG/1
10 TABLET, FILM COATED ORAL NIGHTLY
Qty: 90 TABLET | Refills: 3 | Status: ON HOLD | OUTPATIENT
Start: 2019-07-30 | End: 2020-01-03

## 2019-07-30 RX ORDER — ALENDRONATE SODIUM 70 MG/1
70 TABLET ORAL WEEKLY
Qty: 12 TABLET | Refills: 3 | Status: SHIPPED | OUTPATIENT
Start: 2019-07-30 | End: 2020-08-13

## 2019-07-30 RX ORDER — BUDESONIDE AND FORMOTEROL FUMARATE DIHYDRATE 160; 4.5 UG/1; UG/1
2 AEROSOL RESPIRATORY (INHALATION) 2 TIMES DAILY
Qty: 3 INHALER | Refills: 3 | Status: SHIPPED | OUTPATIENT
Start: 2019-07-30 | End: 2019-08-27

## 2019-07-30 RX ORDER — ALBUTEROL SULFATE 90 UG/1
2 AEROSOL, METERED RESPIRATORY (INHALATION) EVERY 4 HOURS PRN
Qty: 1 INHALER | Refills: 6 | Status: SHIPPED | OUTPATIENT
Start: 2019-07-30

## 2019-07-30 RX ORDER — CLOPIDOGREL BISULFATE 75 MG/1
75 TABLET ORAL DAILY
Qty: 90 TABLET | Refills: 3 | Status: SHIPPED | OUTPATIENT
Start: 2019-07-30 | End: 2020-08-10

## 2019-07-30 RX ORDER — ATORVASTATIN CALCIUM 40 MG/1
40 TABLET, FILM COATED ORAL
Qty: 90 TABLET | Refills: 3 | Status: SHIPPED | OUTPATIENT
Start: 2019-07-30 | End: 2020-09-24

## 2019-07-30 RX ORDER — EZETIMIBE 10 MG/1
10 TABLET ORAL DAILY
Qty: 90 TABLET | Refills: 3 | Status: SHIPPED | OUTPATIENT
Start: 2019-07-30 | End: 2020-08-10

## 2019-07-30 NOTE — PROGRESS NOTES
HPI:   Emmett Abdi is a 80year old female who presents for a Medicare Subsequent Annual Wellness visit (Pt already had Initial Annual Wellness).     She has a past medical history of PAD, subclavian stenosis, mild carotid stenosis, h/o DVT, o showing stable size of the L renal mass. Plan is to continue surveillance with serial imaging. Patient has multiple compression fractures of her thoracic spine; s/p T8 kyphoplasty. She is on fosamax, vitamin D and calcium.      Recently she has had mor of functional status. Preparing your meals: Need some help  She has difficulties Managing Money/Bills based on screening of functional status.    Managing money/bills: Able without help                She has Walking problems based on screening of functio 02/15/2018    TRIG 108 02/15/2018          Last Chemistry Labs:   Lab Results   Component Value Date    AST 26 12/11/2018    ALT 16 12/11/2018    CA 10.2 (H) 06/04/2019    ALB 4.0 12/11/2018    TSH 1.30 07/27/2017    CREATSERUM 1.39 (H) 06/04/2019    GLU 1 pulmonary disease) ([facility] [address] DVT (deep venous thrombosis) ([facility] [address] (3/1/14), High cholesterol, Hyperlipidemia, Lumbar spinal stenosis, Lupus ([facility] [address] Macular degeneration, Nodule of kidney, Osteoporosis, Peripheral vascular disease ([facility] [address] Renal disorder, Subclavia Uncorrected Both Eyes Chart Acuity: 20/30   Able To Tolerate Visual Acuity: Yes      General Appearance:  Alert, cooperative, no distress, appears stated age   Head:  Normocephalic, without obvious abnormality, atraumatic   Eyes:  PERRL, conjunctiva/cornea Medicare Assessment. PLAN SUMMARY:   Diagnoses and all orders for this visit:    Postmenopausal  -     XR DEXA BONE DENSITOMETRY (CPT=77080); Future    PAD (peripheral artery disease) (HCC)  -     LIPID PANEL;  Future  -     COMP METABOLIC PANEL (14); F 12/2015 showing mild bilateral carotid disease    Aspirin 81mg daily, zetia daily    Peripheral vascular disease - the patient states she started Pletal in 2014; [facility] vascular surgeon Dr Ivy Fay [facility]; CTA 6/2017 shows moderate stenosis in distal R common No  How does the patient maintain a good energy level?: Stretching; Appropriate Exercise  How would you describe your daily physical activity?: Light  How would you describe your current health state?: Fair  How do you maintain positive mental well-being?: Screening Mammogram      Mammogram Annually to 76, then as discussed There are no preventive care reminders to display for this patient.  Update Health Maintenance if applicable     Immunizations (Update Immunization Activity if applicable)     Influenza

## 2019-07-30 NOTE — PATIENT INSTRUCTIONS
Selene Sandhoff Schultz's SCREENING SCHEDULE   Tests on this list are recommended by your physician but may not be covered, or covered at this frequency, by your insurer. Please check with your insurance carrier before scheduling to verify coverage. Cancer Screening  Covered up to Age 76     Colonoscopy Screen   Covered every 10 years- more often if abnormal There are no preventive care reminders to display for this patient.  Update [employer] if applicable    Flex Sigmoidoscopy Screen  Covered visit. Please get every year    Pneumococcal 13 (Prevnar)  Covered Once after 65 No orders found for this or any previous visit.  Please get once after your 65th birthday    Pneumococcal 23 (Pneumovax)  Covered Once after 65 No orders found for this or any frequency, by your insurer. Please check with your insurance carrier before scheduling to verify coverage.    PREVENTATIVE SERVICES  INDICATIONS AND SCHEDULE Internal Lab or Procedure External Lab or Procedure   Diabetes Screening      HbgA1C    At Least  A reminders to display for this patient. Update Health Maintenance if applicable    Flex Sigmoidoscopy Screen  Covered every 5 years No results found for this or any previous visit. No flowsheet data found.      Fecal Occult Blood   Covered Annually Occult Bl once after your 65th birthday    Pneumococcal 23 (Pneumovax)  Covered Once after 65 No orders found for this or any previous visit.  Please get once after your 65th birthday    Hepatitis B for Moderate/High Risk       No orders found for this or any previou

## 2019-07-30 NOTE — TELEPHONE ENCOUNTER
[facility]   Furosemide tab 20 mg  Pt expects to  rx today      Tasked to rx low

## 2019-07-31 RX ORDER — FUROSEMIDE 20 MG/1
TABLET ORAL
Qty: 30 TABLET | Refills: 0 | OUTPATIENT
Start: 2019-07-31

## 2019-07-31 NOTE — TELEPHONE ENCOUNTER
Patient was advised to d/c per MD.   Current refill request refused due to refill is either a duplicate request or has active refills at the pharmacy. Check previous templates.     Requested Prescriptions     Refused Prescriptions Disp Refills   • Cristine Jenkins

## 2019-08-01 ENCOUNTER — HOSPITAL ENCOUNTER (OUTPATIENT)
Dept: GENERAL RADIOLOGY | Facility: HOSPITAL | Age: 84
Discharge: HOME | End: 2019-08-01
Attending: INTERNAL MEDICINE
Payer: MEDICARE

## 2019-08-01 ENCOUNTER — TELEPHONE (OUTPATIENT)
Dept: INTERNAL MEDICINE CLINIC | Facility: CLINIC | Age: 84
End: 2019-08-01

## 2019-08-01 ENCOUNTER — APPOINTMENT (OUTPATIENT)
Dept: LAB | Facility: HOSPITAL | Age: 84
End: 2019-08-01
Attending: INTERNAL MEDICINE
Payer: MEDICARE

## 2019-08-01 DIAGNOSIS — E78.5 DYSLIPIDEMIA: ICD-10-CM

## 2019-08-01 DIAGNOSIS — I73.9 PAD (PERIPHERAL ARTERY DISEASE) (HCC): ICD-10-CM

## 2019-08-01 DIAGNOSIS — R53.83 FATIGUE, UNSPECIFIED TYPE: ICD-10-CM

## 2019-08-01 DIAGNOSIS — M25.512 ACUTE PAIN OF LEFT SHOULDER: Primary | ICD-10-CM

## 2019-08-01 DIAGNOSIS — M25.512 ACUTE PAIN OF LEFT SHOULDER: ICD-10-CM

## 2019-08-01 LAB
ALBUMIN SERPL-MCNC: 3.5 G/DL (ref 3.4–5)
ALBUMIN/GLOB SERPL: 1.1 {RATIO} (ref 1–2)
ALP LIVER SERPL-CCNC: 72 U/L (ref 55–142)
ALT SERPL-CCNC: 40 U/L (ref 13–56)
ANION GAP SERPL CALC-SCNC: 6 MMOL/L (ref 0–18)
AST SERPL-CCNC: 29 U/L (ref 15–37)
BILIRUB SERPL-MCNC: 0.6 MG/DL (ref 0.1–2)
BUN BLD-MCNC: 19 MG/DL (ref 7–18)
BUN/CREAT SERPL: 18.1 (ref 10–20)
CALCIUM BLD-MCNC: 9.3 MG/DL (ref 8.5–10.1)
CHLORIDE SERPL-SCNC: 108 MMOL/L (ref 98–112)
CHOLEST SMN-MCNC: 162 MG/DL (ref ?–200)
CO2 SERPL-SCNC: 27 MMOL/L (ref 21–32)
CREAT BLD-MCNC: 1.05 MG/DL (ref 0.55–1.02)
GLOBULIN PLAS-MCNC: 3.1 G/DL (ref 2.8–4.4)
GLUCOSE BLD-MCNC: 80 MG/DL (ref 70–99)
HDLC SERPL-MCNC: 79 MG/DL (ref 40–59)
LDLC SERPL CALC-MCNC: 64 MG/DL (ref ?–100)
M PROTEIN MFR SERPL ELPH: 6.6 G/DL (ref 6.4–8.2)
NONHDLC SERPL-MCNC: 83 MG/DL (ref ?–130)
OSMOLALITY SERPL CALC.SUM OF ELEC: 293 MOSM/KG (ref 275–295)
PATIENT FASTING: YES
PATIENT FASTING: YES
POTASSIUM SERPL-SCNC: 4.7 MMOL/L (ref 3.5–5.1)
SODIUM SERPL-SCNC: 141 MMOL/L (ref 136–145)
TRIGL SERPL-MCNC: 93 MG/DL (ref 30–149)
TSI SER-ACNC: 0.73 MIU/ML (ref 0.36–3.74)
VLDLC SERPL CALC-MCNC: 19 MG/DL (ref 0–30)

## 2019-08-01 PROCEDURE — 36415 COLL VENOUS BLD VENIPUNCTURE: CPT

## 2019-08-01 PROCEDURE — 80053 COMPREHEN METABOLIC PANEL: CPT

## 2019-08-01 PROCEDURE — 80061 LIPID PANEL: CPT

## 2019-08-01 PROCEDURE — 73030 X-RAY EXAM OF SHOULDER: CPT | Performed by: INTERNAL MEDICINE

## 2019-08-01 PROCEDURE — 84443 ASSAY THYROID STIM HORMONE: CPT

## 2019-08-01 NOTE — TELEPHONE ENCOUNTER
Janeen Guerrero called and informed of xray order placed by Dr Jagdeep Bee. Apologized for the delay, Janeen Guerrero states it was not a problem. No further questions noted.

## 2019-08-01 NOTE — TELEPHONE ENCOUNTER
Please notify patient's son that patient's labs look good, could benefit from hydrating a little more. Her cholesterol and thyroid function are good. 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 Would proceed with seeing an orthopedic.

## 2019-08-01 NOTE — TELEPHONE ENCOUNTER
Called patient's son Gopi Johnson, ok per HIPAA, I relayed Dr Tae Mast message to him and he verbalized understanding. He will discuss with Deanna Menjivar but asked that I relay message to her as well. I called Deanna Menjivar and relayed Dr Tae Mast message to her.  She verbalized

## 2019-08-01 NOTE — TELEPHONE ENCOUNTER
To Dr. Deny Helms - pt's son Hermelinda Russell calling- pt at [address] [facility] - had labs drawn today, Hermelinda Russell states order for Xray of left shoulder needed - Hermelinda Russell: [phone]

## 2019-08-04 DIAGNOSIS — L29.9 ITCHING: ICD-10-CM

## 2019-08-05 RX ORDER — HYDROXYZINE HYDROCHLORIDE 25 MG/1
TABLET, FILM COATED ORAL
Qty: 90 TABLET | Refills: 3 | OUTPATIENT
Start: 2019-08-05

## 2019-08-05 NOTE — TELEPHONE ENCOUNTER
Current refill request refused due to refill is either a duplicate request or has active refills at the pharmacy. Check previous templates.     Requested Prescriptions     Refused Prescriptions Disp Refills   • HYDROXYZINE HCL 25 MG Oral Tab [Pharmacy Med

## 2019-08-16 ENCOUNTER — TELEPHONE (OUTPATIENT)
Dept: INTERNAL MEDICINE CLINIC | Facility: CLINIC | Age: 84
End: 2019-08-16

## 2019-08-16 ENCOUNTER — TELEPHONE (OUTPATIENT)
Dept: CARDIOLOGY | Age: 84
End: 2019-08-16

## 2019-08-16 NOTE — TELEPHONE ENCOUNTER
All requested meds Rx sent 7/30/19. Attempted to call pt multiple times - no answer and no option to leave voicemail (phone just disconnects after several rings). Message relayed to patient's son Mavis Sofia (per HIPAA) who verbalized understanding.  Not

## 2019-08-16 NOTE — TELEPHONE ENCOUNTER
Pt is calling to check on the recently medication refills made on 7/3/219.  Pt has only gotten two medications in the mail since the refills were sent to [facility] and the pt is getting very concerned that the rest are not in yet, especially worried about th

## 2019-08-21 ENCOUNTER — TELEPHONE (OUTPATIENT)
Dept: CARDIOLOGY | Age: 84
End: 2019-08-21

## 2019-08-21 ENCOUNTER — V-VISIT (OUTPATIENT)
Dept: CARDIOLOGY | Age: 84
End: 2019-08-21

## 2019-08-21 NOTE — TELEPHONE ENCOUNTER
Called son per hipaa who states patient received all medications except inhaler - RN explained to son to call [facility] to find out regarding inhaler and if problems to give our office a call- verbalized understanding

## 2019-08-26 ENCOUNTER — TELEPHONE (OUTPATIENT)
Dept: INTERNAL MEDICINE CLINIC | Facility: CLINIC | Age: 84
End: 2019-08-26

## 2019-08-27 RX ORDER — BUDESONIDE AND FORMOTEROL FUMARATE DIHYDRATE 160; 4.5 UG/1; UG/1
2 AEROSOL RESPIRATORY (INHALATION) 2 TIMES DAILY
Qty: 1 INHALER | Refills: 0 | Status: SHIPPED | OUTPATIENT
Start: 2019-08-27 | End: 2019-10-16

## 2019-08-27 NOTE — TELEPHONE ENCOUNTER
Spoke with son; he would like one inhaler sent to [facility] (this was done). Nursing to call [facility] regarding budesonide-formoterol inhaler as patient has still not received this this was prescribed on 7/30. Please update the son.

## 2019-08-27 NOTE — TELEPHONE ENCOUNTER
S/w pharmacist at [facility]. She states Budesonide inhaler along with 4 other medications patient requested a refill on are on hold. Pharmacist states patient needs to call to verify payment before they proceed with delivery.  S/w son Flavia Figueroated and update provid

## 2019-08-28 ENCOUNTER — APPOINTMENT (OUTPATIENT)
Dept: CARDIOLOGY | Age: 84
End: 2019-08-28

## 2019-08-29 NOTE — TELEPHONE ENCOUNTER
See telephone encounter 8/26/19. Refills on hold at [facility] pending verification of payment with pt.  Son will call pharmacy

## 2019-09-02 DIAGNOSIS — L29.9 ITCHING: ICD-10-CM

## 2019-09-03 RX ORDER — HYDROXYZINE HYDROCHLORIDE 25 MG/1
TABLET, FILM COATED ORAL
Qty: 90 TABLET | Refills: 3 | OUTPATIENT
Start: 2019-09-03

## 2019-09-04 ENCOUNTER — MED REC SCAN ONLY (OUTPATIENT)
Dept: INTERNAL MEDICINE CLINIC | Facility: CLINIC | Age: 84
End: 2019-09-04

## 2019-09-04 NOTE — TELEPHONE ENCOUNTER
Refill request has failed the Ambulatory Medication Refill Standing Order and is routed to the primary physician to review the following:    Requested Prescriptions     Refused Prescriptions Disp Refills   • HYDROXYZINE HCL 25 MG Oral Tab [Pharmacy Med Nam

## 2019-09-06 ENCOUNTER — TELEPHONE (OUTPATIENT)
Dept: CARDIOLOGY | Age: 84
End: 2019-09-06

## 2019-09-06 ENCOUNTER — LAB ENCOUNTER (OUTPATIENT)
Dept: LAB | Facility: HOSPITAL | Age: 84
End: 2019-09-06
Attending: INTERNAL MEDICINE
Payer: MEDICARE

## 2019-09-06 ENCOUNTER — TELEPHONE (OUTPATIENT)
Dept: INTERNAL MEDICINE CLINIC | Facility: CLINIC | Age: 84
End: 2019-09-06

## 2019-09-06 ENCOUNTER — CLINICAL ABSTRACT (OUTPATIENT)
Dept: CARDIOLOGY | Age: 84
End: 2019-09-06

## 2019-09-06 ENCOUNTER — HOSPITAL ENCOUNTER (OUTPATIENT)
Dept: GENERAL RADIOLOGY | Facility: HOSPITAL | Age: 84
Discharge: HOME | End: 2019-09-06
Attending: INTERNAL MEDICINE
Payer: MEDICARE

## 2019-09-06 ENCOUNTER — OFFICE VISIT (OUTPATIENT)
Dept: CARDIOLOGY | Age: 84
End: 2019-09-06

## 2019-09-06 VITALS
DIASTOLIC BLOOD PRESSURE: 70 MMHG | OXYGEN SATURATION: 94 % | BODY MASS INDEX: 25.76 KG/M2 | SYSTOLIC BLOOD PRESSURE: 114 MMHG | HEIGHT: 62 IN | WEIGHT: 140 LBS | HEART RATE: 68 BPM

## 2019-09-06 DIAGNOSIS — I73.9 CLAUDICATION IN PERIPHERAL VASCULAR DISEASE (CMD): Primary | ICD-10-CM

## 2019-09-06 DIAGNOSIS — I73.9 CLAUDICATION IN PERIPHERAL VASCULAR DISEASE (HCC): ICD-10-CM

## 2019-09-06 DIAGNOSIS — I73.9 CLAUDICATION (CMD): Primary | ICD-10-CM

## 2019-09-06 DIAGNOSIS — I10 HTN, GOAL BELOW 130/80: ICD-10-CM

## 2019-09-06 LAB
ABSOLUTE IMMATURE GRANULOCYTES (OFFPRE24): NORMAL
ANION GAP SERPL CALC-SCNC: 6 MMOL/L (ref 0–18)
ANION GAP SERPL CALC-SCNC: NORMAL MMOL/L
BASO+EOS+MONOS # BLD: NORMAL 10*3/UL
BASO+EOS+MONOS NFR BLD: NORMAL %
BASOPHILS # BLD AUTO: 0.06 X10(3) UL (ref 0–0.2)
BASOPHILS # BLD: NORMAL 10*3/UL
BASOPHILS NFR BLD AUTO: 0.5 %
BASOPHILS NFR BLD: NORMAL %
BUN BLD-MCNC: 28 MG/DL (ref 7–18)
BUN SERPL-MCNC: 28 MG/DL
BUN/CREAT SERPL: 25.9 (ref 10–20)
BUN/CREAT SERPL: NORMAL
CALCIUM BLD-MCNC: 9.4 MG/DL (ref 8.5–10.1)
CALCIUM SERPL-MCNC: 9.4 MG/DL
CHLORIDE SERPL-SCNC: 111 MMOL/L
CHLORIDE SERPL-SCNC: 111 MMOL/L (ref 98–112)
CO2 SERPL-SCNC: 27 MMOL/L (ref 21–32)
CO2 SERPL-SCNC: NORMAL MMOL/L
CREAT BLD-MCNC: 1.08 MG/DL (ref 0.55–1.02)
CREAT SERPL-MCNC: 1.08 MG/DL
DEPRECATED RDW RBC AUTO: 53.7 FL (ref 35.1–46.3)
DIFFERENTIAL METHOD BLD: NORMAL
EOSINOPHIL # BLD AUTO: 0.24 X10(3) UL (ref 0–0.7)
EOSINOPHIL # BLD: NORMAL 10*3/UL
EOSINOPHIL NFR BLD AUTO: 2.1 %
EOSINOPHIL NFR BLD: NORMAL %
ERYTHROCYTE [DISTWIDTH] IN BLOOD BY AUTOMATED COUNT: 14.7 % (ref 11–15)
ERYTHROCYTE [DISTWIDTH] IN BLOOD: NORMAL %
GLUCOSE BLD-MCNC: 77 MG/DL (ref 70–99)
GLUCOSE SERPL-MCNC: 77 MG/DL
HCT VFR BLD AUTO: 43.6 % (ref 35–48)
HCT VFR BLD CALC: 43.6 %
HGB BLD-MCNC: 14.2 G/DL
HGB BLD-MCNC: 14.2 G/DL (ref 12–16)
IMM GRANULOCYTES # BLD AUTO: 0.05 X10(3) UL (ref 0–1)
IMM GRANULOCYTES NFR BLD: 0.4 %
IMMATURE GRANULOCYTES (OFFPRE25): NORMAL
LENGTH OF FAST TIME PATIENT: NORMAL H
LYMPHOCYTES # BLD AUTO: 1.45 X10(3) UL (ref 1–4)
LYMPHOCYTES # BLD: NORMAL 10*3/UL
LYMPHOCYTES NFR BLD AUTO: 12.9 %
LYMPHOCYTES NFR BLD: NORMAL %
MCH RBC QN AUTO: 32 PG (ref 26–34)
MCH RBC QN AUTO: NORMAL PG
MCHC RBC AUTO-ENTMCNC: 32.6 G/DL (ref 31–37)
MCHC RBC AUTO-ENTMCNC: NORMAL G/DL
MCV RBC AUTO: 98.2 FL (ref 80–100)
MCV RBC AUTO: NORMAL FL
MONOCYTES # BLD AUTO: 0.69 X10(3) UL (ref 0.1–1)
MONOCYTES # BLD: NORMAL 10*3/UL
MONOCYTES NFR BLD AUTO: 6.1 %
MONOCYTES NFR BLD: NORMAL %
MPV (OFFPRE2): NORMAL
NEUTROPHILS # BLD AUTO: 8.75 X10 (3) UL (ref 1.5–7.7)
NEUTROPHILS # BLD AUTO: 8.75 X10(3) UL (ref 1.5–7.7)
NEUTROPHILS # BLD: NORMAL 10*3/UL
NEUTROPHILS NFR BLD AUTO: 78 %
NEUTROPHILS NFR BLD: NORMAL %
NRBC BLD MANUAL-RTO: NORMAL %
OSMOLALITY SERPL CALC.SUM OF ELEC: 302 MOSM/KG (ref 275–295)
PATIENT FASTING: NO
PLAT MORPH BLD: NORMAL
PLATELET # BLD AUTO: 177 10(3)UL (ref 150–450)
PLATELET # BLD: 177 10*3/UL
POTASSIUM SERPL-SCNC: 4.2 MMOL/L
POTASSIUM SERPL-SCNC: 4.2 MMOL/L (ref 3.5–5.1)
RBC # BLD AUTO: 4.44 X10(6)UL (ref 3.8–5.3)
RBC # BLD: 4.44 10*6/UL
RBC MORPH BLD: NORMAL
SODIUM SERPL-SCNC: 144 MMOL/L
SODIUM SERPL-SCNC: 144 MMOL/L (ref 136–145)
WBC # BLD AUTO: 11.2 X10(3) UL (ref 4–11)
WBC # BLD: 11.2 10*3/UL
WBC MORPH BLD: NORMAL

## 2019-09-06 PROCEDURE — 80048 BASIC METABOLIC PNL TOTAL CA: CPT

## 2019-09-06 PROCEDURE — 85025 COMPLETE CBC W/AUTO DIFF WBC: CPT

## 2019-09-06 PROCEDURE — 36415 COLL VENOUS BLD VENIPUNCTURE: CPT

## 2019-09-06 PROCEDURE — 71046 X-RAY EXAM CHEST 2 VIEWS: CPT | Performed by: INTERNAL MEDICINE

## 2019-09-06 PROCEDURE — 99214 OFFICE O/P EST MOD 30 MIN: CPT | Performed by: INTERNAL MEDICINE

## 2019-09-06 RX ORDER — HYDROXYZINE HYDROCHLORIDE 25 MG/1
25 TABLET, FILM COATED ORAL 3 TIMES DAILY PRN
Qty: 90 TABLET | Refills: 3 | Status: ON HOLD | OUTPATIENT
Start: 2019-09-06 | End: 2020-01-03

## 2019-09-06 ASSESSMENT — PATIENT HEALTH QUESTIONNAIRE - PHQ9
2. FEELING DOWN, DEPRESSED OR HOPELESS: NOT AT ALL
1. LITTLE INTEREST OR PLEASURE IN DOING THINGS: NOT AT ALL
SUM OF ALL RESPONSES TO PHQ9 QUESTIONS 1 AND 2: 0
SUM OF ALL RESPONSES TO PHQ9 QUESTIONS 1 AND 2: 0

## 2019-09-06 NOTE — TELEPHONE ENCOUNTER
Spoke to patient's son, Rossy Hoodns (Cortney Hahn per HIPPA). He states his mother received 6 albuterol inhalers from the mail delivery service. He would like to know why so many. Informed Rossy Crowe it looks like Dr. Krystal Jackson ordered 6 refills so that may be why they sent 6.  He

## 2019-09-06 NOTE — TELEPHONE ENCOUNTER
Nash Paris is calling his mom was sent albuterol inhalers through [facility] about six of them. He doesn't remember her being prescribed this he wants to know if Dr. Esther Mariscal wants her to use them please advise ph.  # [phone]   Routed to clinical

## 2019-09-10 ENCOUNTER — TELEPHONE (OUTPATIENT)
Dept: INTERNAL MEDICINE CLINIC | Facility: CLINIC | Age: 84
End: 2019-09-10

## 2019-09-10 NOTE — TELEPHONE ENCOUNTER
Pt calling asking why did she get this Albuterol Sulfate from her mailorder pt stated she is no longer wheezing and she have another inhaler.

## 2019-09-11 NOTE — PLAN OF CARE
9-11-19 Hydration orders  Are NS at 75cc/hr for 3 hrs; then reduce to 20cc/hr Rec'd from Dr. Zaria Crawford.

## 2019-09-12 ENCOUNTER — HOSPITAL ENCOUNTER (OUTPATIENT)
Dept: INTERVENTIONAL RADIOLOGY/VASCULAR | Facility: HOSPITAL | Age: 84
Discharge: HOME | End: 2019-09-12
Attending: INTERNAL MEDICINE | Admitting: INTERNAL MEDICINE
Payer: MEDICARE

## 2019-09-12 VITALS
DIASTOLIC BLOOD PRESSURE: 56 MMHG | OXYGEN SATURATION: 98 % | HEIGHT: 62 IN | WEIGHT: 145 LBS | HEART RATE: 85 BPM | SYSTOLIC BLOOD PRESSURE: 114 MMHG | RESPIRATION RATE: 18 BRPM | BODY MASS INDEX: 26.68 KG/M2

## 2019-09-12 DIAGNOSIS — I73.9 CLAUDICATION (HCC): ICD-10-CM

## 2019-09-12 PROCEDURE — 36247 INS CATH ABD/L-EXT ART 3RD: CPT | Performed by: INTERNAL MEDICINE

## 2019-09-12 PROCEDURE — 75716 ARTERY X-RAYS ARMS/LEGS: CPT

## 2019-09-12 PROCEDURE — B41F1ZZ FLUOROSCOPY OF RIGHT LOWER EXTREMITY ARTERIES USING LOW OSMOLAR CONTRAST: ICD-10-PCS | Performed by: INTERNAL MEDICINE

## 2019-09-12 PROCEDURE — 99153 MOD SED SAME PHYS/QHP EA: CPT

## 2019-09-12 PROCEDURE — 36245 INS CATH ABD/L-EXT ART 1ST: CPT

## 2019-09-12 PROCEDURE — 99152 MOD SED SAME PHYS/QHP 5/>YRS: CPT

## 2019-09-12 PROCEDURE — 75710 ARTERY X-RAYS ARM/LEG: CPT | Performed by: INTERNAL MEDICINE

## 2019-09-12 PROCEDURE — 36415 COLL VENOUS BLD VENIPUNCTURE: CPT

## 2019-09-12 PROCEDURE — 99152 MOD SED SAME PHYS/QHP 5/>YRS: CPT | Performed by: INTERNAL MEDICINE

## 2019-09-12 RX ORDER — LIDOCAINE HYDROCHLORIDE 20 MG/ML
INJECTION, SOLUTION EPIDURAL; INFILTRATION; INTRACAUDAL; PERINEURAL
Status: COMPLETED
Start: 2019-09-12 | End: 2019-09-12

## 2019-09-12 RX ORDER — SODIUM CHLORIDE 9 MG/ML
INJECTION, SOLUTION INTRAVENOUS
Status: COMPLETED
Start: 2019-09-12 | End: 2019-09-12

## 2019-09-12 RX ORDER — SODIUM CHLORIDE 0.9 % (FLUSH) 0.9 %
10 SYRINGE (ML) INJECTION AS NEEDED
Status: DISCONTINUED | OUTPATIENT
Start: 2019-09-12 | End: 2019-09-12

## 2019-09-12 RX ORDER — SODIUM CHLORIDE 9 MG/ML
INJECTION, SOLUTION INTRAVENOUS
Status: DISCONTINUED
Start: 2019-09-12 | End: 2019-09-12

## 2019-09-12 RX ORDER — MIDAZOLAM HYDROCHLORIDE 1 MG/ML
INJECTION INTRAMUSCULAR; INTRAVENOUS
Status: COMPLETED
Start: 2019-09-12 | End: 2019-09-12

## 2019-09-12 RX ORDER — SODIUM CHLORIDE 9 MG/ML
75 INJECTION, SOLUTION INTRAVENOUS CONTINUOUS
Status: DISCONTINUED | OUTPATIENT
Start: 2019-09-12 | End: 2019-09-12

## 2019-09-12 RX ORDER — HEPARIN SODIUM 1000 [USP'U]/ML
INJECTION, SOLUTION INTRAVENOUS; SUBCUTANEOUS
Status: COMPLETED
Start: 2019-09-12 | End: 2019-09-12

## 2019-09-12 RX ORDER — DIPHENHYDRAMINE HYDROCHLORIDE 50 MG/ML
INJECTION INTRAMUSCULAR; INTRAVENOUS
Status: DISCONTINUED
Start: 2019-09-12 | End: 2019-09-12 | Stop reason: WASHOUT

## 2019-09-12 NOTE — PROGRESS NOTES
Pt is able to sit up and ambulate without any difficulty. Procedure site remains dry and intact with no signs of bleeding and hematoma. Pt tolerated food/fluid. Instructions given. Pt/Family verbalized understanding.

## 2019-09-12 NOTE — PROCEDURES
Cardiologist: Santosh De Santiago MD  Primary Proceduralist: Santosh De Santiago MD  Procedure Performed: Right lower extremity angiogram/attempted opening of  of the distal right SFA, popliteal and tibioperoneal trunk. Unsuccessful.   Date of Procedure: 9/12/201 catheters wire were withdrawn and 6 Kenyan short sheath was placed for deployment of the Mynx device. Selective left femoral angiogram done assess anatomy for closure.       Specimen sent to: No specimen collected  Estimated blood loss: 10 cc  Closure: M

## 2019-09-16 NOTE — TELEPHONE ENCOUNTER
Pt calling stated she did not have her leg surgery to many blocked arteries like to discuss this with Dr.N puentes a new surgeon.

## 2019-09-18 ENCOUNTER — HOSPITAL ENCOUNTER (OUTPATIENT)
Dept: BONE DENSITY | Facility: HOSPITAL | Age: 84
Discharge: HOME | End: 2019-09-18
Attending: INTERNAL MEDICINE
Payer: MEDICARE

## 2019-09-18 DIAGNOSIS — Z78.0 POSTMENOPAUSAL: ICD-10-CM

## 2019-09-18 PROCEDURE — 77080 DXA BONE DENSITY AXIAL: CPT | Performed by: INTERNAL MEDICINE

## 2019-09-19 ENCOUNTER — TELEPHONE (OUTPATIENT)
Dept: INTERNAL MEDICINE CLINIC | Facility: CLINIC | Age: 84
End: 2019-09-19

## 2019-09-19 DIAGNOSIS — I73.9 PERIPHERAL ARTERY DISEASE (HCC): Primary | ICD-10-CM

## 2019-09-19 DIAGNOSIS — M48.062 SPINAL STENOSIS OF LUMBAR REGION WITH NEUROGENIC CLAUDICATION: ICD-10-CM

## 2019-09-19 NOTE — TELEPHONE ENCOUNTER
Pt had angiogram with Dr Mariano Matthew  & recommends pt should have physical therapy  Son, Katie Mcdowell, asks if this is ordered by Dr Alexandre Chaney or Dr Mariano Matthew    Please call him to advise [phone]

## 2019-09-23 ENCOUNTER — TELEPHONE (OUTPATIENT)
Dept: INTERNAL MEDICINE CLINIC | Facility: CLINIC | Age: 84
End: 2019-09-23

## 2019-09-24 NOTE — TELEPHONE ENCOUNTER
Unable to LM on home phone as line was busy. Spoke to pt's son who reports he will advise patient to call back for results.

## 2019-09-24 NOTE — TELEPHONE ENCOUNTER
Please notify patient that her bone density test is not showing much improvement with fosamax and she still has osteoporosis. I would consider changing regimens at this time and trying an injection called prolia. This works differently than fosamax.  It wor

## 2019-09-25 NOTE — TELEPHONE ENCOUNTER
I spoke with patient and relayed Dr. Caitlin Moran message. She verbalized understanding. She is agreeable to start Prolia. She asked if she should still take her Fosamax in the meantime. To Dr. Devonte Ding, thank you.

## 2019-09-25 NOTE — TELEPHONE ENCOUNTER
I would ask her to continue fosamax in the meantime; we will call her once we hear from insurance that this has been approved.

## 2019-10-11 ENCOUNTER — OFFICE VISIT (OUTPATIENT)
Dept: CARDIOLOGY | Age: 84
End: 2019-10-11

## 2019-10-11 VITALS
WEIGHT: 138 LBS | SYSTOLIC BLOOD PRESSURE: 102 MMHG | HEART RATE: 88 BPM | BODY MASS INDEX: 25.4 KG/M2 | OXYGEN SATURATION: 94 % | DIASTOLIC BLOOD PRESSURE: 76 MMHG | HEIGHT: 62 IN

## 2019-10-11 DIAGNOSIS — I25.10 CORONARY ARTERY DISEASE INVOLVING NATIVE CORONARY ARTERY OF NATIVE HEART WITHOUT ANGINA PECTORIS: ICD-10-CM

## 2019-10-11 DIAGNOSIS — I10 HTN, GOAL BELOW 130/80: ICD-10-CM

## 2019-10-11 DIAGNOSIS — I73.9 PERIPHERAL ARTERY DISEASE (CMD): Primary | ICD-10-CM

## 2019-10-11 PROCEDURE — 99214 OFFICE O/P EST MOD 30 MIN: CPT | Performed by: INTERNAL MEDICINE

## 2019-10-11 RX ORDER — ASPIRIN 81 MG/1
81 TABLET ORAL DAILY
Refills: 3 | COMMUNITY
Start: 2019-09-04

## 2019-10-16 ENCOUNTER — OFFICE VISIT (OUTPATIENT)
Dept: INTERNAL MEDICINE CLINIC | Facility: CLINIC | Age: 84
End: 2019-10-16
Payer: MEDICARE

## 2019-10-16 VITALS
OXYGEN SATURATION: 97 % | HEART RATE: 85 BPM | BODY MASS INDEX: 27 KG/M2 | DIASTOLIC BLOOD PRESSURE: 62 MMHG | SYSTOLIC BLOOD PRESSURE: 112 MMHG | TEMPERATURE: 97 F | HEIGHT: 62 IN

## 2019-10-16 DIAGNOSIS — I73.9 PERIPHERAL ARTERY DISEASE (HCC): Primary | ICD-10-CM

## 2019-10-16 DIAGNOSIS — M48.062 SPINAL STENOSIS OF LUMBAR REGION WITH NEUROGENIC CLAUDICATION: ICD-10-CM

## 2019-10-16 DIAGNOSIS — R26.2 UNABLE TO AMBULATE: ICD-10-CM

## 2019-10-16 PROCEDURE — 99214 OFFICE O/P EST MOD 30 MIN: CPT | Performed by: INTERNAL MEDICINE

## 2019-10-16 PROCEDURE — G0463 HOSPITAL OUTPT CLINIC VISIT: HCPCS | Performed by: INTERNAL MEDICINE

## 2019-10-16 RX ORDER — FUROSEMIDE 20 MG/1
20 TABLET ORAL DAILY
COMMUNITY
End: 2019-10-21

## 2019-10-16 RX ORDER — BUDESONIDE AND FORMOTEROL FUMARATE DIHYDRATE 160; 4.5 UG/1; UG/1
2 AEROSOL RESPIRATORY (INHALATION) 2 TIMES DAILY
Qty: 3 INHALER | Refills: 2 | Status: SHIPPED | OUTPATIENT
Start: 2019-10-16 | End: 2021-02-18

## 2019-10-16 RX ORDER — POTASSIUM CHLORIDE 20 MEQ/1
20 TABLET, EXTENDED RELEASE ORAL DAILY
Status: ON HOLD | COMMUNITY
End: 2020-05-15

## 2019-10-16 RX ORDER — GABAPENTIN 100 MG/1
100 CAPSULE ORAL 3 TIMES DAILY
Qty: 90 CAPSULE | Refills: 0 | Status: SHIPPED | OUTPATIENT
Start: 2019-10-16 | End: 2019-11-11

## 2019-10-16 NOTE — PROGRESS NOTES
Akil Baldwin is a 80year old female. Patient presents with: Follow - Up: Pain in both legs, very weak unable to walk.        HPI:   Akil Baldwin is a 80year old female who presents for: follow up    Patient is continuing to have significant pain Oral Tab, Take 20 mg by mouth daily. , Disp: , Rfl:   gabapentin 100 MG Oral Cap, Take 1 capsule (100 mg total) by mouth 3 (three) times daily. , Disp: 90 capsule, Rfl: 0  Budesonide-Formoterol Fumarate 160-4.5 MCG/ACT Inhalation Aerosol, Inhale 2 puffs into 3/1/14    right leg   • High cholesterol    • Hyperlipidemia    • Lumbar spinal stenosis    • Lupus (HCC)    • Macular degeneration    • Nodule of kidney    • Osteoporosis    • Peripheral vascular disease (HCC)    • Renal disorder    • Subclavian arterial 500mg qid prn, start gabapentin bid x 1 week and then increase to TID. Start using lidoderm patches 1-2 to lower back daily. Discussed that to help with her mobility, a motorized scooter may help.  She is very limited in her movement even around her apa

## 2019-10-17 NOTE — TELEPHONE ENCOUNTER
Spoke to son - they saw Fayetta Buhl and no surgery;   Thomas Oshea would like you to fill out the paperwork (states you have it) for the scooter

## 2019-10-17 NOTE — TELEPHONE ENCOUNTER
Spoke to pt and son---reimbursement form in;   Copy mailed to Jose Elias so he can check pt portion of cost;   He will call me if ready for me to order injection for pt

## 2019-10-21 ENCOUNTER — TELEPHONE (OUTPATIENT)
Dept: INTERNAL MEDICINE CLINIC | Facility: CLINIC | Age: 84
End: 2019-10-21

## 2019-10-21 NOTE — TELEPHONE ENCOUNTER
Per Dr. Mei Zavala- she would like to generate order for motorized scooter as discussed at [address] 10/16/19. Spoke with patient who reports that she believes her son is working on this and she requests we call him.      Spoke with patient's son Mariajose Rocha (OK per ANGI

## 2019-10-22 RX ORDER — AMITRIPTYLINE HYDROCHLORIDE 10 MG/1
10 TABLET, FILM COATED ORAL NIGHTLY
Qty: 90 TABLET | Refills: 3 | OUTPATIENT
Start: 2019-10-22

## 2019-10-22 NOTE — TELEPHONE ENCOUNTER
Amitriptyline refilled 7/30/19 for year supply to [facility]    Furosemide d/c'd by Dr. Mary Antony on 7/30/19 and then added historically on 10/16/19    To Dr. Mary Antony, please clarify ok to refill furosemide 20mg daily?     Nursing, please ask pt if she wishes refill sent to

## 2019-10-22 NOTE — TELEPHONE ENCOUNTER
Pt's son, Dory Rojas, called  Contacted medicare regarding scooter  Needs prescription from Dr Jesi Mckeon stating pt cannot walk safely with a cane or a walker     Rajendra's call back # [phone]

## 2019-10-23 RX ORDER — FUROSEMIDE 20 MG/1
20 TABLET ORAL DAILY
Qty: 90 TABLET | Refills: 3 | Status: ON HOLD | OUTPATIENT
Start: 2019-10-23 | End: 2020-05-15

## 2019-10-29 NOTE — TELEPHONE ENCOUNTER
Pt son Mariajose Rocha is calling  Dr Mei Zavala filled out the order for the scotter, there needs to be a change    On the second page 4 paragraphs down   the wording needs to be changed from  \"she is able to walk a few feet within her assisted living\"    It needs to

## 2019-11-05 NOTE — TELEPHONE ENCOUNTER
Spoke to son - reimbursement okayed;   Pt cost will likely be about $88;  He would like to proceed;   Prolia ordered

## 2019-11-11 ENCOUNTER — TELEPHONE (OUTPATIENT)
Dept: INTERNAL MEDICINE CLINIC | Facility: CLINIC | Age: 84
End: 2019-11-11

## 2019-11-12 RX ORDER — GABAPENTIN 100 MG/1
CAPSULE ORAL
Qty: 360 CAPSULE | Refills: 3 | Status: SHIPPED | OUTPATIENT
Start: 2019-11-12 | End: 2020-07-15

## 2019-12-20 ENCOUNTER — TELEPHONE (OUTPATIENT)
Dept: INTERNAL MEDICINE CLINIC | Facility: CLINIC | Age: 84
End: 2019-12-20

## 2019-12-21 RX ORDER — EZETIMIBE 10 MG/1
10 TABLET ORAL DAILY
Qty: 90 TABLET | Refills: 3 | OUTPATIENT
Start: 2019-12-21 | End: 2020-12-15

## 2019-12-21 RX ORDER — CLOPIDOGREL BISULFATE 75 MG/1
75 TABLET ORAL DAILY
Qty: 90 TABLET | Refills: 3 | OUTPATIENT
Start: 2019-12-21

## 2019-12-28 RX ORDER — FUROSEMIDE 20 MG/1
TABLET ORAL
Qty: 90 TABLET | Refills: 3 | OUTPATIENT
Start: 2019-12-28

## 2019-12-30 ENCOUNTER — TELEPHONE (OUTPATIENT)
Dept: INTERNAL MEDICINE CLINIC | Facility: CLINIC | Age: 84
End: 2019-12-30

## 2019-12-30 RX ORDER — PREDNISONE 20 MG/1
20 TABLET ORAL DAILY
Qty: 5 TABLET | Refills: 0 | Status: ON HOLD | OUTPATIENT
Start: 2019-12-30 | End: 2019-12-31

## 2019-12-30 RX ORDER — AZITHROMYCIN 250 MG/1
TABLET, FILM COATED ORAL
Qty: 6 TABLET | Refills: 0 | Status: ON HOLD | OUTPATIENT
Start: 2019-12-30 | End: 2019-12-31

## 2019-12-30 NOTE — TELEPHONE ENCOUNTER
I called and spoke to [facility]. I relayed Dr Rafa Tim message to him. He verbalized understanding and was thankful for the call. He will start galileo on medications today and bring her in to see Dr Queenie Arias tomorrow.

## 2019-12-30 NOTE — TELEPHONE ENCOUNTER
Sent in rx for prednisone once daily x 5 days and to start a zpak. Use the inhaler. Would prefer pt be seen as she has gotten low on oxygen before when having an infection so would recommend still being seen by Dr. Ronny Bee in the morning.

## 2019-12-30 NOTE — TELEPHONE ENCOUNTER
For the last couple of days, patient has had an URI infection. No temp   Coughing - not sure if there is sputum  No chest issues  Sore throat      Son wants patient to be seen today at any time or tomorrow anytime.       Call alison Magana at:  [phone]

## 2019-12-30 NOTE — TELEPHONE ENCOUNTER
Checked with Dr Susana King. She has no openings tomorrow. I spoke to Javier. He reports Luis Manuel Martinez has \"not been sounding good for the last 2-3 days\"  Her dementia is getting worse so it is hard to tell. He thinks she sounds short of breath.  When she is talki

## 2019-12-31 ENCOUNTER — HOSPITAL ENCOUNTER (INPATIENT)
Facility: HOSPITAL | Age: 84
LOS: 3 days | Discharge: NURSING, REHAB OR LONG-TERM CARE | DRG: 191 | End: 2020-01-03
Attending: EMERGENCY MEDICINE | Admitting: INTERNAL MEDICINE
Payer: MEDICARE

## 2019-12-31 ENCOUNTER — OFFICE VISIT (OUTPATIENT)
Dept: INTERNAL MEDICINE CLINIC | Facility: CLINIC | Age: 84
End: 2019-12-31
Payer: MEDICARE

## 2019-12-31 ENCOUNTER — APPOINTMENT (OUTPATIENT)
Dept: GENERAL RADIOLOGY | Facility: HOSPITAL | Age: 84
DRG: 191 | End: 2019-12-31
Attending: EMERGENCY MEDICINE
Payer: MEDICARE

## 2019-12-31 VITALS
TEMPERATURE: 99 F | SYSTOLIC BLOOD PRESSURE: 124 MMHG | OXYGEN SATURATION: 93 % | HEART RATE: 92 BPM | DIASTOLIC BLOOD PRESSURE: 70 MMHG | BODY MASS INDEX: 27 KG/M2 | HEIGHT: 62 IN | RESPIRATION RATE: 16 BRPM

## 2019-12-31 DIAGNOSIS — J06.9 URI, ACUTE: Primary | ICD-10-CM

## 2019-12-31 DIAGNOSIS — J44.1 COPD EXACERBATION (HCC): Primary | ICD-10-CM

## 2019-12-31 DIAGNOSIS — J96.91 RESPIRATORY FAILURE WITH HYPOXIA, UNSPECIFIED CHRONICITY (HCC): ICD-10-CM

## 2019-12-31 DIAGNOSIS — N30.00 ACUTE CYSTITIS WITHOUT HEMATURIA: ICD-10-CM

## 2019-12-31 PROBLEM — E87.3 RESPIRATORY ALKALOSIS: Status: ACTIVE | Noted: 2019-12-31

## 2019-12-31 LAB
ANION GAP SERPL CALC-SCNC: 5 MMOL/L (ref 0–18)
BASE EXCESS BLD CALC-SCNC: -2.3 MMOL/L (ref ?–2)
BASOPHILS # BLD AUTO: 0.03 X10(3) UL (ref 0–0.2)
BASOPHILS NFR BLD AUTO: 0.5 %
BILIRUB UR QL: NEGATIVE
BLOOD GAS EPAP: 5 CM H2O
BLOOD GAS IPAP: 10 CM H2O
BUN BLD-MCNC: 17 MG/DL (ref 7–18)
BUN/CREAT SERPL: 15 (ref 10–20)
CALCIUM BLD-MCNC: 9 MG/DL (ref 8.5–10.1)
CHLORIDE SERPL-SCNC: 108 MMOL/L (ref 98–112)
CO2 SERPL-SCNC: 26 MMOL/L (ref 21–32)
COLOR UR: YELLOW
CREAT BLD-MCNC: 1.13 MG/DL (ref 0.55–1.02)
DEPRECATED RDW RBC AUTO: 49.1 FL (ref 35.1–46.3)
EOSINOPHIL # BLD AUTO: 0.01 X10(3) UL (ref 0–0.7)
EOSINOPHIL NFR BLD AUTO: 0.2 %
ERYTHROCYTE [DISTWIDTH] IN BLOOD BY AUTOMATED COUNT: 13.6 % (ref 11–15)
FLUAV + FLUBV RNA SPEC NAA+PROBE: NEGATIVE
FLUAV + FLUBV RNA SPEC NAA+PROBE: NEGATIVE
FLUAV + FLUBV RNA SPEC NAA+PROBE: POSITIVE
GLUCOSE BLD-MCNC: 83 MG/DL (ref 70–99)
GLUCOSE UR-MCNC: NEGATIVE MG/DL
HCO3 BLDA-SCNC: 23 MEQ/L (ref 21–27)
HCT VFR BLD AUTO: 43.1 % (ref 35–48)
HGB BLD-MCNC: 13.9 G/DL (ref 12–16)
IMM GRANULOCYTES # BLD AUTO: 0.02 X10(3) UL (ref 0–1)
IMM GRANULOCYTES NFR BLD: 0.3 %
LYMPHOCYTES # BLD AUTO: 0.91 X10(3) UL (ref 1–4)
LYMPHOCYTES NFR BLD AUTO: 13.7 %
MCH RBC QN AUTO: 31.3 PG (ref 26–34)
MCHC RBC AUTO-ENTMCNC: 32.3 G/DL (ref 31–37)
MCV RBC AUTO: 97.1 FL (ref 80–100)
MODIFIED ALLEN TEST: POSITIVE
MONOCYTES # BLD AUTO: 0.56 X10(3) UL (ref 0.1–1)
MONOCYTES NFR BLD AUTO: 8.4 %
NEUTROPHILS # BLD AUTO: 5.12 X10 (3) UL (ref 1.5–7.7)
NEUTROPHILS # BLD AUTO: 5.12 X10(3) UL (ref 1.5–7.7)
NEUTROPHILS NFR BLD AUTO: 76.9 %
NITRITE UR QL STRIP.AUTO: NEGATIVE
O2 CT BLD-SCNC: 19 VOL% (ref 15–23)
O2/TOTAL GAS SETTING VFR VENT: 28 %
OSMOLALITY SERPL CALC.SUM OF ELEC: 289 MOSM/KG (ref 275–295)
PCO2 BLDA: 33 MM HG (ref 35–45)
PH BLDA: 7.42 [PH] (ref 7.35–7.45)
PH UR: 5 [PH] (ref 5–8)
PLATELET # BLD AUTO: 150 10(3)UL (ref 150–450)
PO2 BLDA: 77 MM HG (ref 80–100)
POTASSIUM SERPL-SCNC: 4.7 MMOL/L (ref 3.5–5.1)
PROT UR-MCNC: 30 MG/DL
PUNCTURE CHARGE: YES
RBC # BLD AUTO: 4.44 X10(6)UL (ref 3.8–5.3)
RBC #/AREA URNS AUTO: 9 /HPF
RESP RATE: 12 BPM
SAO2 % BLDA: 97.7 % (ref 94–100)
SODIUM SERPL-SCNC: 139 MMOL/L (ref 136–145)
SP GR UR STRIP: 1.02 (ref 1–1.03)
UROBILINOGEN UR STRIP-ACNC: <2
WBC # BLD AUTO: 6.7 X10(3) UL (ref 4–11)
WBC #/AREA URNS AUTO: 1395 /HPF

## 2019-12-31 PROCEDURE — 71045 X-RAY EXAM CHEST 1 VIEW: CPT | Performed by: EMERGENCY MEDICINE

## 2019-12-31 PROCEDURE — 99283 EMERGENCY DEPT VISIT LOW MDM: CPT | Performed by: INTERNAL MEDICINE

## 2019-12-31 PROCEDURE — 99213 OFFICE O/P EST LOW 20 MIN: CPT | Performed by: INTERNAL MEDICINE

## 2019-12-31 PROCEDURE — G0463 HOSPITAL OUTPT CLINIC VISIT: HCPCS | Performed by: INTERNAL MEDICINE

## 2019-12-31 RX ORDER — AZITHROMYCIN 250 MG/1
500 TABLET, FILM COATED ORAL DAILY
Status: COMPLETED | OUTPATIENT
Start: 2019-12-31 | End: 2019-12-31

## 2019-12-31 RX ORDER — GABAPENTIN 100 MG/1
100 CAPSULE ORAL 3 TIMES DAILY
Status: DISCONTINUED | OUTPATIENT
Start: 2019-12-31 | End: 2020-01-03

## 2019-12-31 RX ORDER — ALBUTEROL SULFATE 2.5 MG/3ML
2.5 SOLUTION RESPIRATORY (INHALATION)
Status: DISCONTINUED | OUTPATIENT
Start: 2019-12-31 | End: 2020-01-03

## 2019-12-31 RX ORDER — METHYLPREDNISOLONE SODIUM SUCCINATE 40 MG/ML
40 INJECTION, POWDER, LYOPHILIZED, FOR SOLUTION INTRAMUSCULAR; INTRAVENOUS EVERY 12 HOURS
Status: COMPLETED | OUTPATIENT
Start: 2020-01-01 | End: 2020-01-02

## 2019-12-31 RX ORDER — CLOPIDOGREL BISULFATE 75 MG/1
75 TABLET ORAL DAILY
Status: DISCONTINUED | OUTPATIENT
Start: 2019-12-31 | End: 2020-01-03

## 2019-12-31 RX ORDER — IPRATROPIUM BROMIDE AND ALBUTEROL SULFATE 2.5; .5 MG/3ML; MG/3ML
3 SOLUTION RESPIRATORY (INHALATION) ONCE
Status: COMPLETED | OUTPATIENT
Start: 2019-12-31 | End: 2019-12-31

## 2019-12-31 RX ORDER — ATORVASTATIN CALCIUM 40 MG/1
40 TABLET, FILM COATED ORAL
Status: DISCONTINUED | OUTPATIENT
Start: 2019-12-31 | End: 2020-01-03

## 2019-12-31 RX ORDER — METHYLPREDNISOLONE SODIUM SUCCINATE 40 MG/ML
40 INJECTION, POWDER, LYOPHILIZED, FOR SOLUTION INTRAMUSCULAR; INTRAVENOUS EVERY 12 HOURS
Status: DISCONTINUED | OUTPATIENT
Start: 2019-12-31 | End: 2019-12-31

## 2019-12-31 RX ORDER — ALBUTEROL SULFATE 2.5 MG/3ML
2.5 SOLUTION RESPIRATORY (INHALATION)
Status: DISCONTINUED | OUTPATIENT
Start: 2019-12-31 | End: 2019-12-31

## 2019-12-31 RX ORDER — IPRATROPIUM BROMIDE AND ALBUTEROL SULFATE 2.5; .5 MG/3ML; MG/3ML
3 SOLUTION RESPIRATORY (INHALATION) EVERY 6 HOURS PRN
Status: DISCONTINUED | OUTPATIENT
Start: 2019-12-31 | End: 2020-01-03

## 2019-12-31 RX ORDER — HEPARIN SODIUM 5000 [USP'U]/ML
5000 INJECTION, SOLUTION INTRAVENOUS; SUBCUTANEOUS EVERY 12 HOURS SCHEDULED
Status: DISCONTINUED | OUTPATIENT
Start: 2019-12-31 | End: 2020-01-03

## 2019-12-31 RX ORDER — AZITHROMYCIN 250 MG/1
250 TABLET, FILM COATED ORAL DAILY
Status: DISCONTINUED | OUTPATIENT
Start: 2020-01-01 | End: 2020-01-03

## 2019-12-31 RX ORDER — SODIUM CHLORIDE 9 MG/ML
INJECTION, SOLUTION INTRAVENOUS CONTINUOUS
Status: DISCONTINUED | OUTPATIENT
Start: 2019-12-31 | End: 2020-01-02

## 2019-12-31 RX ORDER — ASPIRIN 81 MG/1
81 TABLET ORAL DAILY
Status: DISCONTINUED | OUTPATIENT
Start: 2019-12-31 | End: 2020-01-03

## 2019-12-31 RX ORDER — EZETIMIBE 10 MG/1
10 TABLET ORAL DAILY
Status: DISCONTINUED | OUTPATIENT
Start: 2019-12-31 | End: 2020-01-03

## 2019-12-31 NOTE — ED NOTES
Pt with hx  presents with increasing SOB x3 days. Endorses cough, increased weakness and difficulty transfering to wheelchair. Son states pt exhibiting increased AMS. Pt denies cp. Afebrile at this time. AOx2 - disoriented to time.  Received duoneb in

## 2019-12-31 NOTE — CONSULTS
Pulmonary/Critical Care Consultation Note    HPI:   Gisell Ren is a 80year old female with Patient presents with:  Dyspnea EDA Joy Young DO    Pt is a 79 yo with dementia, COPD, HL, PVD, subclavian stenosis, RCC and other med prob brought daily. 90 tablet 3   • Potassium Chloride ER 20 MEQ Oral Tab CR Take 20 mEq by mouth daily. • Budesonide-Formoterol Fumarate 160-4.5 MCG/ACT Inhalation Aerosol Inhale 2 puffs into the lungs 2 (two) times daily.  3 Inhaler 2   • hydrOXYzine HCl 25 MG Ora on file      Highest education level: Not on file    Tobacco Use      Smoking status: Former Smoker        Quit date: 3/1/2014        Years since quittin.8      Smokeless tobacco: Never Used    Substance and Sexual Activity      Alcohol use:  Yes

## 2019-12-31 NOTE — ED PROVIDER NOTES
Patient Seen in: [facility] AND [facility] Emergency Department      History   Patient presents with:  Dyspnea EDA SOB    Stated Complaint: cough/sob/weakness    HPI    She presents to the emergency department complaining of cough shortness of breath and weaknes stated complaint: cough/sob/weakness  Other systems are as noted in HPI. Constitutional and vital signs reviewed. All other systems reviewed and negative except as noted above.     Physical Exam     ED Triage Vitals [12/31/19 1007]   /84   Pulse American 44 (*)     GFR, -American 51 (*)     All other components within normal limits   URINALYSIS WITH CULTURE REFLEX - Abnormal; Notable for the following components:    Clarity Urine Cloudy (*)     Ketones Urine Trace (*)     Blood Urine Modera order CBC WITH DIFFERENTIAL WITH PLATELET.   Procedure                               Abnormality         Status                     ---------                               -----------         ------                     CBC W/ DIFFERENTIAL[[id number]] hematuria    Disposition:  Admit  12/31/2019 12:27 pm    Follow-up:  No follow-up provider specified.   We recommend that you schedule follow up care with a primary care provider within the next three months to obtain basic health screening including reasse

## 2019-12-31 NOTE — PROGRESS NOTES
Miguel Jackson is a 80year old female. HPI:   She has URI - started with cough 3 days ago - no apparent fever or chills, appetite down -  Son brought her here today and feels that she is more confused than usual. Much cough at night.  No  Sore throat o HCl 25 MG Oral Tab Take 1 tablet (25 mg total) by mouth 3 (three) times daily as needed for Itching. 90 tablet 3   • alendronate 70 MG Oral Tab Take 1 tablet (70 mg total) by mouth once a week.  12 tablet 3   • Amitriptyline HCl 10 MG Oral Tab Take 1 tablet tobacco: Never Used    Alcohol use: Yes      Comment: occ wine with dinner    Drug use: No       REVIEW OF SYSTEMS:   GENERAL HEALTH: feels well otherwise  SKIN: denies any unusual skin lesions or rashes  RESPIRATORY: denies shortness of breath with exerti

## 2019-12-31 NOTE — ED INITIAL ASSESSMENT (HPI)
PT came in for SOB at rest, hx of COPD. Family reports AMS and cough with weakness for over 2 days. Pt lives alone, unable to transfer from wheelchair. SOB at rest with audible wheezing.

## 2020-01-01 LAB
ALBUMIN SERPL-MCNC: 2.1 G/DL (ref 3.4–5)
ALBUMIN/GLOB SERPL: 0.6 {RATIO} (ref 1–2)
ALP LIVER SERPL-CCNC: 52 U/L (ref 55–142)
ALT SERPL-CCNC: 24 U/L (ref 13–56)
ANION GAP SERPL CALC-SCNC: 8 MMOL/L (ref 0–18)
AST SERPL-CCNC: 28 U/L (ref 15–37)
BASOPHILS # BLD AUTO: 0.01 X10(3) UL (ref 0–0.2)
BASOPHILS NFR BLD AUTO: 0.2 %
BILIRUB SERPL-MCNC: 0.5 MG/DL (ref 0.1–2)
BUN BLD-MCNC: 23 MG/DL (ref 7–18)
BUN/CREAT SERPL: 20.9 (ref 10–20)
CALCIUM BLD-MCNC: 8.1 MG/DL (ref 8.5–10.1)
CHLORIDE SERPL-SCNC: 115 MMOL/L (ref 98–112)
CO2 SERPL-SCNC: 17 MMOL/L (ref 21–32)
CREAT BLD-MCNC: 1.1 MG/DL (ref 0.55–1.02)
DEPRECATED RDW RBC AUTO: 51.1 FL (ref 35.1–46.3)
EOSINOPHIL # BLD AUTO: 0 X10(3) UL (ref 0–0.7)
EOSINOPHIL NFR BLD AUTO: 0 %
ERYTHROCYTE [DISTWIDTH] IN BLOOD BY AUTOMATED COUNT: 13.8 % (ref 11–15)
GLOBULIN PLAS-MCNC: 3.7 G/DL (ref 2.8–4.4)
GLUCOSE BLD-MCNC: 155 MG/DL (ref 70–99)
HCT VFR BLD AUTO: 38.2 % (ref 35–48)
HGB BLD-MCNC: 12.1 G/DL (ref 12–16)
IMM GRANULOCYTES # BLD AUTO: 0.03 X10(3) UL (ref 0–1)
IMM GRANULOCYTES NFR BLD: 0.7 %
LYMPHOCYTES # BLD AUTO: 0.49 X10(3) UL (ref 1–4)
LYMPHOCYTES NFR BLD AUTO: 10.8 %
M PROTEIN MFR SERPL ELPH: 5.8 G/DL (ref 6.4–8.2)
MCH RBC QN AUTO: 31.8 PG (ref 26–34)
MCHC RBC AUTO-ENTMCNC: 31.7 G/DL (ref 31–37)
MCV RBC AUTO: 100.5 FL (ref 80–100)
MONOCYTES # BLD AUTO: 0.35 X10(3) UL (ref 0.1–1)
MONOCYTES NFR BLD AUTO: 7.7 %
NEUTROPHILS # BLD AUTO: 3.66 X10 (3) UL (ref 1.5–7.7)
NEUTROPHILS # BLD AUTO: 3.66 X10(3) UL (ref 1.5–7.7)
NEUTROPHILS NFR BLD AUTO: 80.6 %
OSMOLALITY SERPL CALC.SUM OF ELEC: 297 MOSM/KG (ref 275–295)
PLATELET # BLD AUTO: 87 10(3)UL (ref 150–450)
POTASSIUM SERPL-SCNC: 4 MMOL/L (ref 3.5–5.1)
RBC # BLD AUTO: 3.8 X10(6)UL (ref 3.8–5.3)
SODIUM SERPL-SCNC: 140 MMOL/L (ref 136–145)
WBC # BLD AUTO: 4.5 X10(3) UL (ref 4–11)

## 2020-01-01 PROCEDURE — 99233 SBSQ HOSP IP/OBS HIGH 50: CPT | Performed by: INTERNAL MEDICINE

## 2020-01-01 PROCEDURE — 99223 1ST HOSP IP/OBS HIGH 75: CPT | Performed by: INTERNAL MEDICINE

## 2020-01-01 RX ORDER — HYDROXYZINE HYDROCHLORIDE 25 MG/1
25 TABLET, FILM COATED ORAL 3 TIMES DAILY PRN
Qty: 270 TABLET | Refills: 0 | OUTPATIENT
Start: 2020-01-01

## 2020-01-01 NOTE — H&P
[address] Patient Status:  Inpatient    1934 MRN [id number]   Location [facility] 2W/ Attending Mp Velásquez DO   Hosp Day # 1 PCP Davon Rivera DO     Date:  2020  Date of Admis Strength Route/ Site Freq. Albuterol Sulfate (Aero Soln) Albuterol Sulfate  (90 Base) MCG/ACT Inhalation Inhale 2 puffs into the lungs every 4 (four) hours as needed for Wheezing.       Alendronate Sodium (Tab) FOSAMAX 70 MG Oral Take 1 tablet (70 breast cancer   • Colon Cancer Other         Aunt had colon cancer   • Cancer Sister         breast   • Colon Cancer Sister 68             Review of Systems:    ROS:   Constitutional: no weight loss; no fatigue  ENMT:  Negative for ear drainage, hearing lo ulcerations            Laboratory Data:     Lab Results   Component Value Date     01/01/2020     01/01/2020    K 4.0 01/01/2020     01/01/2020    CO2 17.0 01/01/2020    BUN 23 01/01/2020    CREATSERUM 1.10 01/01/2020    CA 8.1 01/01/202 >100K E Coli; on ceftriaxone 1 gm IV [id number], d#2; F/U sensitivities    Urinary incontinence   s/p cystoscopy and midurethral sling with Dr. Mariama Mackey on 12/19/18.     Hypercholesterolemia  On ezetimibe 10 mg po qD     Osteoporosis   DEXA -2.7 T-score in [place]    Subclavian stenosis- CT chest 3/2017 shows occluded L subclavian artery.     History of DVT- She had a DVT in 2014, shortly after her laminectomy, and was treated for 6 months with Xarelto.     OA spine  Status post L2-S1 decompressive laminectomy wit

## 2020-01-01 NOTE — H&P
Pt is an 80year old female with pmh of dementia and COPD presenting with AMS, wheezing, sob x 2 days. She was seen in office today for evaluation of shortness of breath and wheezing.  Her son Alondra Carreno reports that patient has not been eating well for 2 day

## 2020-01-01 NOTE — PROGRESS NOTES
Pulmonary/Critical Care Follow Up Note    HPI:   Sotrmy Funez is a 80year old female with Patient presents with:  Dyspnea EDA SOB      PCP Loretta Garcia, DO  Admission Attending [address] Day #1    Feeling better  Confused and limits hx 40 mg, 40 mg, Intravenous, Q12H      Allergies:  No Known Allergies        PHYSICAL EXAM:   Blood pressure 133/73, pulse 88, temperature 97.4 °F (36.3 °C), temperature source Temporal, resp.  rate 21, height 5' 2\" (1.575 m), weight 130 lb (59 kg), SpO2 94

## 2020-01-01 NOTE — SLP NOTE
ADULT SWALLOWING EVALUATION    ASSESSMENT    ASSESSMENT/OVERALL IMPRESSION:      This BSE was ordered to r/o aspiration. PMH includes COPD. No PMH of dysphagia at [facility].          Pt alert, on 2 HF L/min 02 NC, afebrile and assessed sitting upright in chair Upright position; Slow rate;Small bites and sips; No straw  Medication Administration Recommendations: One pill at a time  Treatment Plan/Recommendations: Aspiration precautions  Discharge Recommendations/Plan: July Smart presentation;Cup;Straw  Patient Positioning: Upright;Midline;Standard chair    Oral Phase of Swallow:  Within Functional Limits       Pharyngeal Phase of Swallow: Impaired  Laryngeal Elevation Timing: Appears impaired        (Please note: Silent aspiration

## 2020-01-01 NOTE — PLAN OF CARE
Double RN skin check done prior to transfer off Unit. Skin check performed by this RN and Giles Cushing, RN.      Wounds are as follows: blanchable redness to heels which are elevated on a pillow, sacrum intact with mepilex on, blanchable redness to R hip/flank area

## 2020-01-01 NOTE — PLAN OF CARE
High fall risk band in place, bed and chair alarm in use, reinforced call light use (kept nearby) and up with assist only. Up to the bathroom and then to the chair with 1 minimal assist. Appetite fair. Remains forgetful.  Desaturates at times without oxygen Spirometry  - Assess the need for suctioning and perform as needed  - Assess and instruct to report SOB or any respiratory difficulty  - Respiratory Therapy support as indicated  - Manage/alleviate anxiety  - Monitor for signs/symptoms of CO2 retention  Doneen Small

## 2020-01-02 ENCOUNTER — APPOINTMENT (OUTPATIENT)
Dept: GENERAL RADIOLOGY | Facility: HOSPITAL | Age: 85
DRG: 191 | End: 2020-01-02
Attending: INTERNAL MEDICINE
Payer: MEDICARE

## 2020-01-02 ENCOUNTER — TELEPHONE (OUTPATIENT)
Dept: INTERNAL MEDICINE CLINIC | Facility: CLINIC | Age: 85
End: 2020-01-02

## 2020-01-02 LAB
ANION GAP SERPL CALC-SCNC: 6 MMOL/L (ref 0–18)
BASOPHILS # BLD AUTO: 0.01 X10(3) UL (ref 0–0.2)
BASOPHILS NFR BLD AUTO: 0.1 %
BUN BLD-MCNC: 15 MG/DL (ref 7–18)
BUN/CREAT SERPL: 17.9 (ref 10–20)
CALCIUM BLD-MCNC: 8 MG/DL (ref 8.5–10.1)
CHLORIDE SERPL-SCNC: 117 MMOL/L (ref 98–112)
CO2 SERPL-SCNC: 20 MMOL/L (ref 21–32)
CREAT BLD-MCNC: 0.84 MG/DL (ref 0.55–1.02)
DEPRECATED RDW RBC AUTO: 49 FL (ref 35.1–46.3)
EOSINOPHIL # BLD AUTO: 0 X10(3) UL (ref 0–0.7)
EOSINOPHIL NFR BLD AUTO: 0 %
ERYTHROCYTE [DISTWIDTH] IN BLOOD BY AUTOMATED COUNT: 13.6 % (ref 11–15)
GLUCOSE BLD-MCNC: 123 MG/DL (ref 70–99)
HCT VFR BLD AUTO: 37.5 % (ref 35–48)
HGB BLD-MCNC: 11.8 G/DL (ref 12–16)
IMM GRANULOCYTES # BLD AUTO: 0.04 X10(3) UL (ref 0–1)
IMM GRANULOCYTES NFR BLD: 0.6 %
LYMPHOCYTES # BLD AUTO: 0.54 X10(3) UL (ref 1–4)
LYMPHOCYTES NFR BLD AUTO: 8 %
MCH RBC QN AUTO: 30.8 PG (ref 26–34)
MCHC RBC AUTO-ENTMCNC: 31.5 G/DL (ref 31–37)
MCV RBC AUTO: 97.9 FL (ref 80–100)
MONOCYTES # BLD AUTO: 0.31 X10(3) UL (ref 0.1–1)
MONOCYTES NFR BLD AUTO: 4.6 %
NEUTROPHILS # BLD AUTO: 5.84 X10 (3) UL (ref 1.5–7.7)
NEUTROPHILS # BLD AUTO: 5.84 X10(3) UL (ref 1.5–7.7)
NEUTROPHILS NFR BLD AUTO: 86.7 %
OSMOLALITY SERPL CALC.SUM OF ELEC: 298 MOSM/KG (ref 275–295)
PLATELET # BLD AUTO: 111 10(3)UL (ref 150–450)
POTASSIUM SERPL-SCNC: 3.7 MMOL/L (ref 3.5–5.1)
PROCALCITONIN SERPL-MCNC: 0.18 NG/ML
RBC # BLD AUTO: 3.83 X10(6)UL (ref 3.8–5.3)
SODIUM SERPL-SCNC: 143 MMOL/L (ref 136–145)
WBC # BLD AUTO: 6.7 X10(3) UL (ref 4–11)

## 2020-01-02 PROCEDURE — 99232 SBSQ HOSP IP/OBS MODERATE 35: CPT | Performed by: INTERNAL MEDICINE

## 2020-01-02 PROCEDURE — 71045 X-RAY EXAM CHEST 1 VIEW: CPT | Performed by: INTERNAL MEDICINE

## 2020-01-02 RX ORDER — PREDNISONE 20 MG/1
40 TABLET ORAL
Status: DISCONTINUED | OUTPATIENT
Start: 2020-01-03 | End: 2020-01-03

## 2020-01-02 NOTE — WOUND PROGRESS NOTE
WOUND CARE NOTE    History:  Past Medical History:   Diagnosis Date   • Arthritis    • Back problem    • Cancer ([facility] [address]     Kidney   • Compression fracture of L1 lumbar vertebra (HCC)    • COPD (chronic obstructive pulmonary disease) (HCC)    • DVT (deep v in the chair, no c/o pain, her son is at the bedside, the pt's nurse is at the bedside. Per the pt's son, the pt. spilled coffee and burned herself the other day. The pt. was able to stand for assessment, the pt. needs assist, she is unsteady.  The pt. has

## 2020-01-02 NOTE — PROGRESS NOTES
Pulmonary/Critical Care Follow Up Note    HPI:   Gisell Ren is a 80year old female with Patient presents with:  Dyspnea EDA SOB      PCP Nancy Lawton, DO  Admission Attending [address] Day #2    Feeling better  States all is well  No Known Allergies        PHYSICAL EXAM:   Blood pressure 119/63, pulse 98, temperature 97.2 °F (36.2 °C), temperature source Oral, resp. rate 20, height 5' 2\" (1.575 m), weight 130 lb (59 kg), SpO2 98 %, not currently breastfeeding.     Intake/Output Summary

## 2020-01-02 NOTE — PHYSICAL THERAPY NOTE
PHYSICAL THERAPY EVALUATION - INPATIENT     Room Number: 557/557-A  Evaluation Date: 1/2/2020  Type of Evaluation: Initial   Physician Order: PT Eval and Treat    Presenting Problem: COPD, weakness  Reason for Therapy: Mobility Dysfunction and Discharge P training  Rehab Potential : Good  Frequency (Obs): 3-5x/week       PHYSICAL THERAPY MEDICAL/SOCIAL HISTORY     History related to current admission: increased weakness with shortness of breath with inability to safely ambulate     Problem List  Principal P is only available for an increased charge that she is concerned she can not afford.      SUBJECTIVE  I am weaker than my normal because usually I get out of bed get dressed and walk without an assistive device but right now I need help just to get out of be Modifier (G-Code): CK    FUNCTIONAL ABILITY STATUS  Gait Assessment   Gait Assistance: Minimum assistance  Distance (ft): 10  Assistive Device: Rolling walker  Pattern: (intermittent buckling unsteady, decreased step length)  Stoop/Curb Assistance: Not colleen

## 2020-01-02 NOTE — PLAN OF CARE
Pt is A/O x 3. Vitals WDL. Bed in lowest position. Call light in reach. Bed alarm on. Reminded of importance of calling for assistance to get out of bed. IV site rotated. PRN neb given. Will continue to monitor.    Problem: Patient Centered Care  Goal: Stephanie Assess and instruct to report SOB or any respiratory difficulty  - Respiratory Therapy support as indicated  - Manage/alleviate anxiety  - Monitor for signs/symptoms of CO2 retention  Outcome: Progressing     Problem: SKIN/TISSUE INTEGRITY - ADULT  Goal: I

## 2020-01-02 NOTE — PLAN OF CARE
Problem: Patient Centered Care  Goal: Patient preferences are identified and integrated in the patient's plan of care  Description  Interventions:  - What would you like us to know as we care for you?  I am a nurse  - Provide timely, complete, and accurat SKIN/TISSUE INTEGRITY - ADULT  Goal: Incision(s), wounds(s) or drain site(s) healing without S/S of infection  Description  INTERVENTIONS:  - Assess and document risk factors for pressure ulcer development  - Assess and document skin integrity  - Assess an

## 2020-01-02 NOTE — CM/SW NOTE
01/02/20 1000   CM/SW Screening   Referral [address] [facility] staff; Chart review;Nursing rounds   Patient's Mental Status Alert;Oriented   Patient's Home Environment Senior Independent Housing   Patient lives with Alone   MDO for

## 2020-01-02 NOTE — PROGRESS NOTES
[facility]    Progress Note    Jennifer Stoll Patient Status:  Inpatient    1934 MRN [id number]   Location [facility] 5SW/SE Attending Mp Velásquez DO   Hosp Day # 2 PCP Davon Rivera DO       SUBJECTIVE:  Pt feeling ok; st Meenu Ramirez MD on 1/02/2020 at 8:44          Xr Chest Ap Portable  (cpt=71045)    Result Date: 12/31/2019  CONCLUSION:  1. No acute findings.     Dictated by (CST): Lindsey Calvert MD on 12/31/2019 at 11:38     Approved by (CST): Debby Swann Jeffries of L SFA; due to claudication, RLE angiogram performed in Sept 2019 showed long  of the distal SFA, popliteal and TP trunk.  There is no recanalization other than peroneal artery which gives collaterals to anterior tibial and posterior tibial; at

## 2020-01-02 NOTE — OCCUPATIONAL THERAPY NOTE
OCCUPATIONAL THERAPY EVALUATION - INPATIENT     Room Number: 557/557-A  Evaluation Date: 1/2/2020  Type of Evaluation: Initial  Presenting Problem: COPD exacerbation    Physician Order: IP Consult to Occupational Therapy  Reason for Therapy: ADL/IADL Dysfu RECOMMENDATIONS  OT Discharge Recommendations: Sub-acute rehabilitation  OT Device Recommendations: Shower chair    PLAN  OT Treatment Plan: Balance activities; Energy conservation/work simplification techniques;ADL training;IADL training;Functional transfe Home: 0  Use of Assistive Device(s): intermittently will use RW    Prior Level of [place]: rior to admission, patient lives in an independent living facility. She was independent with all her ADL and IADL.  She receives some assist from her son with sh ASSESSMENT  Static Sitting: Good  Dynamic Sitting: Fair Plus  Static Standing: Fair Minus  Dynamic Standing: Poor Plus    FUNCTIONAL ADL ASSESSMENT  Grooming: CGA  Feeding: Set Up  Bathing: Min A  Toileting: Min A  Upper Extremity Dressing: Set Up  Lower E

## 2020-01-02 NOTE — SLP NOTE
SPEECH DAILY NOTE - INPATIENT    ASSESSMENT & PLAN   ASSESSMENT  Pt seen sitting upright in chair and agreeable to PO trials and SLP education. No overt clinical signs of aspiration on current diet. New CXR negative. SLP to sign off.  Please re-order with a restriction can be lifted. No further IP SLP services deemed necessary at this time. SLP to sign off. Goal met. FOLLOW UP  Follow Up Needed: No  Number of Visits to Meet Established Goals: 2  Session: 1 following BSSE.     Donavan Mcburney MA, Eyad Badillo

## 2020-01-03 ENCOUNTER — TELEPHONE (OUTPATIENT)
Dept: CASE MANAGEMENT | Age: 85
End: 2020-01-03

## 2020-01-03 ENCOUNTER — TELEPHONE (OUTPATIENT)
Dept: INTERNAL MEDICINE CLINIC | Facility: CLINIC | Age: 85
End: 2020-01-03

## 2020-01-03 VITALS
BODY MASS INDEX: 23.92 KG/M2 | HEART RATE: 111 BPM | WEIGHT: 130 LBS | DIASTOLIC BLOOD PRESSURE: 64 MMHG | HEIGHT: 62 IN | RESPIRATION RATE: 18 BRPM | OXYGEN SATURATION: 96 % | TEMPERATURE: 97 F | SYSTOLIC BLOOD PRESSURE: 126 MMHG

## 2020-01-03 LAB
ANION GAP SERPL CALC-SCNC: 6 MMOL/L (ref 0–18)
BUN BLD-MCNC: 17 MG/DL (ref 7–18)
BUN/CREAT SERPL: 20 (ref 10–20)
CALCIUM BLD-MCNC: 8 MG/DL (ref 8.5–10.1)
CHLORIDE SERPL-SCNC: 114 MMOL/L (ref 98–112)
CO2 SERPL-SCNC: 24 MMOL/L (ref 21–32)
CREAT BLD-MCNC: 0.85 MG/DL (ref 0.55–1.02)
GLUCOSE BLD-MCNC: 102 MG/DL (ref 70–99)
HAV IGM SER QL: 2 MG/DL (ref 1.6–2.6)
OSMOLALITY SERPL CALC.SUM OF ELEC: 300 MOSM/KG (ref 275–295)
POTASSIUM SERPL-SCNC: 3.6 MMOL/L (ref 3.5–5.1)
SODIUM SERPL-SCNC: 144 MMOL/L (ref 136–145)

## 2020-01-03 PROCEDURE — 99239 HOSP IP/OBS DSCHRG MGMT >30: CPT | Performed by: INTERNAL MEDICINE

## 2020-01-03 PROCEDURE — 99232 SBSQ HOSP IP/OBS MODERATE 35: CPT | Performed by: INTERNAL MEDICINE

## 2020-01-03 RX ORDER — QUETIAPINE 25 MG/1
25 TABLET, FILM COATED ORAL NIGHTLY
Refills: 0 | Status: SHIPPED | COMMUNITY
Start: 2020-01-03 | End: 2020-03-24

## 2020-01-03 RX ORDER — QUETIAPINE 25 MG/1
25 TABLET, FILM COATED ORAL NIGHTLY
Status: DISCONTINUED | OUTPATIENT
Start: 2020-01-03 | End: 2020-01-03

## 2020-01-03 RX ORDER — PREDNISONE 20 MG/1
TABLET ORAL
Qty: 9 TABLET | Refills: 0 | Status: SHIPPED | OUTPATIENT
Start: 2020-01-03 | End: 2020-01-12

## 2020-01-03 RX ORDER — IPRATROPIUM BROMIDE AND ALBUTEROL SULFATE 2.5; .5 MG/3ML; MG/3ML
3 SOLUTION RESPIRATORY (INHALATION) EVERY 6 HOURS PRN
Refills: 0 | Status: SHIPPED | COMMUNITY
Start: 2020-01-03

## 2020-01-03 NOTE — TELEPHONE ENCOUNTER
CS 1/3/2020 12:10pm    Juliana Moore [phone]   [facility] [address] tried to call and get pricing for patients son got no answer will try to call again later and get pricing if no answer will try to call Monday and get more information for marco

## 2020-01-03 NOTE — PROGRESS NOTES
Addendum 1700: Medicar scheduled for 6p-630p. RN Fabiola Nicolas at [facility] updated      Report given to Linda Hough at [facility]. IV removed, tele removed.  Patient aware of poc

## 2020-01-03 NOTE — TELEPHONE ENCOUNTER
Pt called, at [address]  Pt asking if she can stay another day at the hospital?  If pt is to be discharged today requesting order to have the telemetry equipemnt removed and IV removed  tasked to nursing

## 2020-01-03 NOTE — CM/SW NOTE
MDO for dc    Per Memo Wade, [place] does not have a priv room today. Per Miya Christianson at [facility] they can accept pt into priv room at 4pm today. Karly Fontaine RN and Neida Wilson RN notified of above.  [facility] set up for Hailey Church RN to update pt and s

## 2020-01-03 NOTE — PROGRESS NOTES
[facility]    Progress Note    Yash Corey Patient Status:  Inpatient    1934 MRN [id number]   Location [facility] 5SW/SE Attending Madeleine Lynch DO   Hosp Day # 3 PCP Romi Quintanilla DO       SUBJECTIVE:  States she is fee Axel Tidwell MD on 1/02/2020 at 8:44          Xr Chest Ap Portable  (cpt=71045)    Result Date: 12/31/2019  CONCLUSION:  1. No acute findings.     Dictated by (CST): Axel Ramirez MD on 12/31/2019 at 11:38     Approved by (CST): Veronique Ramirez to claudication, RLE angiogram performed in Sept 2019 showed long  of the distal SFA, popliteal and TP trunk.  There is no recanalization other than peroneal artery which gives collaterals to anterior tibial and posterior tibial; attempted opening of CT DO  1/3/2020  9:51 AM

## 2020-01-03 NOTE — PHYSICAL THERAPY NOTE
PHYSICAL THERAPY TREATMENT NOTE - INPATIENT     Room Number: 557/557-A       Presenting Problem: COPD, weakness    Problem List  Principal Problem:    COPD exacerbation ([facility]  Active Problems:    Osteoporosis    PAD (peripheral artery disease) ([facility]    Sub +           Static Standing: Fair -  Dynamic Standing: Poor +    ACTIVITY TOLERANCE                         O2 WALK                  AM-PAC '6-Clicks' INPATIENT SHORT FORM - BASIC MOBILITY  How much difficulty does the patient currently have. ..  -   Keily Ruiz at assistance level: modified independent   Goal #3   Current Status Min A with RW 60 ft with cues for safety   Goal #4 Patient will negotiate 0 stairs/one curb w/ assistive device and supervision   Goal #4   Current Status NT   Goal #5 Patient to demonstr

## 2020-01-03 NOTE — PROGRESS NOTES
Pulmonary/Critical Care Follow Up Note    HPI:   Stormy Funez is a 80year old female with Patient presents with:  Dyspnea EDA SOB      PCP Loretta Garcia, DO  Admission Attending [address] Day #3    Feeling better  States all is well  No MG/3ML) 0.083% nebulizer solution 2.5 mg, 2.5 mg, Nebulization, QID      Allergies:  No Known Allergies        PHYSICAL EXAM:   Blood pressure 126/64, pulse 111, temperature 97.2 °F (36.2 °C), temperature source Oral, resp.  rate 18, height 5' 2\" (1.575 m)

## 2020-01-03 NOTE — OCCUPATIONAL THERAPY NOTE
OCCUPATIONAL THERAPY TREATMENT NOTE - INPATIENT        Room Number: 557/557-A           Presenting Problem: COPD exacberation    Problem List  Principal Problem:    COPD exacerbation ([address]  Active Problems:    Osteoporosis    PAD (peripheral artery disease BEARING RESTRICTION  Weight Bearing Restriction: None                PAIN ASSESSMENT  Ratin           ACTIVITY TOLERANCE  Pulse: 111  Heart Rate Source: Monitor                   O2 SATURATIONS  SPO2 on Room Air at Rest: 91             ACTIVITIES OF DA

## 2020-01-03 NOTE — PLAN OF CARE
Pt is A/O x 3. Forgetful at times. Vitals WDL. Tele in place. Bed in lowest position. Bed alarm on. Call light in reach. Up with standby with walker.  Shower this PM. Will continue to monitor  Problem: Patient Centered Care  Goal: Patient preferences are id report SOB or any respiratory difficulty  - Respiratory Therapy support as indicated  - Manage/alleviate anxiety  - Monitor for signs/symptoms of CO2 retention  Outcome: Progressing     Problem: SKIN/TISSUE INTEGRITY - ADULT  Goal: Incision(s), wounds(s) o

## 2020-01-08 ENCOUNTER — APPOINTMENT (OUTPATIENT)
Dept: CARDIOLOGY | Age: 85
End: 2020-01-08

## 2020-01-09 ENCOUNTER — TELEPHONE (OUTPATIENT)
Dept: INTERNAL MEDICINE CLINIC | Facility: CLINIC | Age: 85
End: 2020-01-09

## 2020-01-09 NOTE — TELEPHONE ENCOUNTER
To Dr. Kelsey Candelario - pt at [facility] [phone] in [place] for 5-6 days. Pt has had vomiting/diarrhea for 3 days. Spoke with [facility] nurse who confirms sx - states pt was negative for c.diff, positive for norovirus. - several pt's have same sx.   Nurse will ca

## 2020-01-09 NOTE — DISCHARGE SUMMARY
[facility] AND [facility]  Discharge Summary    [facility] Patient Status:  Inpatient    1934 MRN [id number]   Location [facility] 5SW/SE Attending No att. providers found   Hosp Day # 3 PCP Wendie Harrell DO     Date of Admission: 2019 infection  CT chest in June 2018 shows mild centrilobular COPD; she has significant smoking history, though she quit in 2013; has 45 pack-year history; on Symbicort as outpatient;  [facility] consult appreciated; presents with acute dyspnea, cough, weakness po BID; currently on ASA 81 mg po qD and Plavix 75 mg po qD     Renal mass  renal US in Jan 2018 showing anechoic cyst measuring 1.8 x 1.5 x 1.5cm cyst at the upper pole of the R kidney and 1.1x0.9x1.1cm lesion with intermediate echogenicity at the upper p Historical, R-0    predniSONE 20 MG Oral Tab  Take 1.5 tablets (30 mg total) by mouth daily for 3 days, THEN 1 tablet (20 mg total) daily for 3 days, THEN 0.5 tablets (10 mg total) daily for 3 days. , Normal, Disp-9 tablet, R-0      CONTINUE these medicatio medications    Amitriptyline HCl 10 MG Oral Tab      >30 min spent on the discharge of this patient    Follow up Visits:  Follow-up with Dr. Jaime Robert after rehab       Ba De  1/9/2020  2:13 PM

## 2020-01-09 NOTE — TELEPHONE ENCOUNTER
Son, Zoie Sullivan, calling to speak with Dr Sandy Corral. Son states the pt is in rehab and has caught the norovirus.  Son is wondering if pt should be in the hospital.    Best call back: [phone]

## 2020-01-22 ENCOUNTER — APPOINTMENT (OUTPATIENT)
Dept: CARDIOLOGY | Age: 85
End: 2020-01-22

## 2020-02-12 ENCOUNTER — TELEPHONE (OUTPATIENT)
Dept: INTERNAL MEDICINE CLINIC | Facility: CLINIC | Age: 85
End: 2020-02-12

## 2020-02-12 NOTE — TELEPHONE ENCOUNTER
I called [facility] 2nd floor at [phone]. I spoke with the patient's nurse and he says he thinks the patient is doing ok. She is concerned because her legs are a little swollen and she is a little anxious.  Nurse says that he has orders for the leg s

## 2020-02-12 NOTE — TELEPHONE ENCOUNTER
Triage needed; Please refer to 1/9 telephone encounter (looks like cdiff was a concern last month as well)

## 2020-02-12 NOTE — TELEPHONE ENCOUNTER
Son, Quinn Hu, is calling because pt has been in a rehab facility for a month and a half at [facility] in [facility]. Pt has come down with C-diff and other infections and son is wondering what can be done about this.      Best call back: [phone]

## 2020-02-13 NOTE — TELEPHONE ENCOUNTER
Had RSV and then UTI. Got better, then got norovirus. Then developed cdiff. Got better, then got it again. Spoke with son.

## 2020-03-15 ENCOUNTER — HOSPITAL ENCOUNTER (EMERGENCY)
Facility: HOSPITAL | Age: 85
Discharge: HOME | End: 2020-03-15
Attending: EMERGENCY MEDICINE
Payer: MEDICARE

## 2020-03-15 ENCOUNTER — APPOINTMENT (OUTPATIENT)
Dept: GENERAL RADIOLOGY | Facility: HOSPITAL | Age: 85
End: 2020-03-15
Attending: EMERGENCY MEDICINE
Payer: MEDICARE

## 2020-03-15 VITALS
SYSTOLIC BLOOD PRESSURE: 135 MMHG | BODY MASS INDEX: 23.92 KG/M2 | WEIGHT: 130 LBS | HEART RATE: 89 BPM | RESPIRATION RATE: 16 BRPM | OXYGEN SATURATION: 99 % | TEMPERATURE: 99 F | DIASTOLIC BLOOD PRESSURE: 64 MMHG | HEIGHT: 62 IN

## 2020-03-15 DIAGNOSIS — R19.7 DIARRHEA, UNSPECIFIED TYPE: Primary | ICD-10-CM

## 2020-03-15 LAB
ADENOVIRUS F 40/41 PCR: NEGATIVE
ANION GAP SERPL CALC-SCNC: 6 MMOL/L (ref 0–18)
ASTROVIRUS PCR: NEGATIVE
BASOPHILS # BLD AUTO: 0.04 X10(3) UL (ref 0–0.2)
BASOPHILS NFR BLD AUTO: 0.7 %
BUN BLD-MCNC: 12 MG/DL (ref 7–18)
BUN/CREAT SERPL: 10.9 (ref 10–20)
C CAYETANENSIS DNA SPEC QL NAA+PROBE: NEGATIVE
C DIFF TOX B STL QL: POSITIVE
CALCIUM BLD-MCNC: 8.9 MG/DL (ref 8.5–10.1)
CAMPY SP DNA.DIARRHEA STL QL NAA+PROBE: NEGATIVE
CHLORIDE SERPL-SCNC: 111 MMOL/L (ref 98–112)
CO2 SERPL-SCNC: 26 MMOL/L (ref 21–32)
CREAT BLD-MCNC: 1.1 MG/DL (ref 0.55–1.02)
CRYPTOSP DNA SPEC QL NAA+PROBE: NEGATIVE
DEPRECATED RDW RBC AUTO: 53.9 FL (ref 35.1–46.3)
EAEC PAA PLAS AGGR+AATA ST NAA+NON-PRB: NEGATIVE
EC STX1+STX2 + H7 FLIC SPEC NAA+PROBE: NEGATIVE
ENTAMOEBA HISTOLYTICA PCR: NEGATIVE
EOSINOPHIL # BLD AUTO: 0.21 X10(3) UL (ref 0–0.7)
EOSINOPHIL NFR BLD AUTO: 3.5 %
EPEC EAE GENE STL QL NAA+NON-PROBE: NEGATIVE
ERYTHROCYTE [DISTWIDTH] IN BLOOD BY AUTOMATED COUNT: 15.1 % (ref 11–15)
ETEC LTA+ST1A+ST1B TOX ST NAA+NON-PROBE: NEGATIVE
GIARDIA LAMBLIA PCR: NEGATIVE
GLUCOSE BLD-MCNC: 107 MG/DL (ref 70–99)
HAV IGM SER QL: 1.8 MG/DL (ref 1.6–2.6)
HCT VFR BLD AUTO: 37.6 % (ref 35–48)
HGB BLD-MCNC: 12.3 G/DL (ref 12–16)
IMM GRANULOCYTES # BLD AUTO: 0.02 X10(3) UL (ref 0–1)
IMM GRANULOCYTES NFR BLD: 0.3 %
LYMPHOCYTES # BLD AUTO: 0.65 X10(3) UL (ref 1–4)
LYMPHOCYTES NFR BLD AUTO: 10.8 %
MCH RBC QN AUTO: 31.6 PG (ref 26–34)
MCHC RBC AUTO-ENTMCNC: 32.7 G/DL (ref 31–37)
MCV RBC AUTO: 96.7 FL (ref 80–100)
MONOCYTES # BLD AUTO: 0.53 X10(3) UL (ref 0.1–1)
MONOCYTES NFR BLD AUTO: 8.8 %
NEUTROPHILS # BLD AUTO: 4.59 X10 (3) UL (ref 1.5–7.7)
NEUTROPHILS # BLD AUTO: 4.59 X10(3) UL (ref 1.5–7.7)
NEUTROPHILS NFR BLD AUTO: 75.9 %
NOROVIRUS GI/GII PCR: NEGATIVE
OSMOLALITY SERPL CALC.SUM OF ELEC: 296 MOSM/KG (ref 275–295)
P SHIGELLOIDES DNA STL QL NAA+PROBE: NEGATIVE
PLATELET # BLD AUTO: 148 10(3)UL (ref 150–450)
POTASSIUM SERPL-SCNC: 3.5 MMOL/L (ref 3.5–5.1)
RBC # BLD AUTO: 3.89 X10(6)UL (ref 3.8–5.3)
ROTAVIRUS A PCR: NEGATIVE
SALMONELLA DNA SPEC QL NAA+PROBE: NEGATIVE
SAPOVIRUS PCR: NEGATIVE
SHIGELLA SP+EIEC IPAH ST NAA+NON-PROBE: NEGATIVE
SODIUM SERPL-SCNC: 143 MMOL/L (ref 136–145)
V CHOLERAE DNA SPEC QL NAA+PROBE: NEGATIVE
VIBRIO DNA SPEC NAA+PROBE: NEGATIVE
WBC # BLD AUTO: 6 X10(3) UL (ref 4–11)
YERSINIA DNA SPEC NAA+PROBE: NEGATIVE

## 2020-03-15 PROCEDURE — 85025 COMPLETE CBC W/AUTO DIFF WBC: CPT | Performed by: EMERGENCY MEDICINE

## 2020-03-15 PROCEDURE — 71045 X-RAY EXAM CHEST 1 VIEW: CPT | Performed by: EMERGENCY MEDICINE

## 2020-03-15 PROCEDURE — 36415 COLL VENOUS BLD VENIPUNCTURE: CPT

## 2020-03-15 PROCEDURE — 80048 BASIC METABOLIC PNL TOTAL CA: CPT | Performed by: EMERGENCY MEDICINE

## 2020-03-15 PROCEDURE — 87493 C DIFF AMPLIFIED PROBE: CPT | Performed by: EMERGENCY MEDICINE

## 2020-03-15 PROCEDURE — 83735 ASSAY OF MAGNESIUM: CPT | Performed by: EMERGENCY MEDICINE

## 2020-03-15 PROCEDURE — 99284 EMERGENCY DEPT VISIT MOD MDM: CPT

## 2020-03-15 PROCEDURE — 87507 IADNA-DNA/RNA PROBE TQ 12-25: CPT | Performed by: EMERGENCY MEDICINE

## 2020-03-15 NOTE — ED NOTES
Pt ambulatory to and from bathroom with 1 assist  BM made, stool sent for processing    Will monitor  stable

## 2020-03-15 NOTE — ED PROVIDER NOTES
Patient Seen in: [facility] Emergency Department      History   Patient presents with:  Diarrhea    Stated Complaint: diarrhea    HPI    79 y/o female with recent UTI on abx then got norovirus and C diff then reportedly got C.  Diff again (?) off a SpO2 96 %   O2 Device None (Room air)       Current:/70   Pulse 89   Temp 99 °F (37.2 °C) (Temporal)   Resp 16   Ht 157.5 cm (5' 2\")   Wt 59 kg   SpO2 97%   BMI 23.78 kg/m²         Physical Exam    Constitutional: Oriented to person, place, and ti Final result                 Please view results for these tests on the individual orders.    RAINBOW DRAW BLUE   RAINBOW DRAW LAVENDER   RAINBOW DRAW LIGHT GREEN   RAINBOW DRAW GOLD   C. DIFFICILE(TOXIGENIC)PCR   GI STOOL PANEL BY PCR numerical 0-10

## 2020-03-15 NOTE — ED NOTES
Received pt a/ox3, clear speech, nad, no resp distress  Here with c/o recurrent diarrhea.  Dx with c-diff and norovirus while at a different nursing facility  Denies fever, chills, cough or sob    Stable  Will monitor

## 2020-03-15 NOTE — ED INITIAL ASSESSMENT (HPI)
Pt reports diarrhea today. Reports she was diagnosed with c-diff and norovirus 3 months ago and was in [facility] for her care.

## 2020-03-15 NOTE — ED NOTES
Pt verbalized understanding of discharge and follow up instructions  Denies additional questioning  Stable upon leaving ed  piv dc'd, catheter intact, bleeding controlled    medicar 60-90 min eta

## 2020-03-16 ENCOUNTER — TELEPHONE (OUTPATIENT)
Dept: INTERNAL MEDICINE CLINIC | Facility: CLINIC | Age: 85
End: 2020-03-16

## 2020-03-16 RX ORDER — VANCOMYCIN HYDROCHLORIDE 250 MG/1
CAPSULE ORAL
Qty: 90 CAPSULE | Refills: 0 | Status: SHIPPED | OUTPATIENT
Start: 2020-03-16 | End: 2020-05-05 | Stop reason: ALTCHOICE

## 2020-03-16 NOTE — TELEPHONE ENCOUNTER
Spoke with Dr Umesh Dupree, patient is to follow up end of May or after 1 month at the earliest. [facility] called and informed that he can give Dr Genia Geiger updates over the phone if needed as well.

## 2020-03-16 NOTE — TELEPHONE ENCOUNTER
To Dr. Pj Mason----    Pt in [facility] yesterday for ongoing diarrhea. Requesting c. Diff results (finalized in records). Please also advise on FU for patient. Thanks!

## 2020-03-16 NOTE — TELEPHONE ENCOUNTER
Dr Cele Campos asking when would you like patient to come in for a follow up? Message relayed to Flavia Angeles with no further questions noted.

## 2020-03-16 NOTE — TELEPHONE ENCOUNTER
Please notify patient's son that patient is positive for cdiff. I have sent in a vancomycin dosing as below:    4 times daily x 2 weeks, then 2 times daily x 2 weeks, then once daily x 2 weeks. They should maintain strict contact isolation.  Wash with

## 2020-03-16 NOTE — TELEPHONE ENCOUNTER
Neo Medina is calling to get his mom/s C-diff results he also wants to make a follow up appt. For her UC visit please advise ph.  # [phone]   Routed to clinical

## 2020-03-18 ENCOUNTER — HOSPITAL ENCOUNTER (OUTPATIENT)
Facility: HOSPITAL | Age: 85
Setting detail: OBSERVATION
LOS: 1 days | Discharge: HOME HEALTH | End: 2020-03-20
Attending: INTERNAL MEDICINE | Admitting: INTERNAL MEDICINE
Payer: MEDICARE

## 2020-03-18 RX ORDER — HEPARIN SODIUM 5000 [USP'U]/ML
5000 INJECTION, SOLUTION INTRAVENOUS; SUBCUTANEOUS EVERY 12 HOURS SCHEDULED
Status: DISCONTINUED | OUTPATIENT
Start: 2020-03-18 | End: 2020-03-20

## 2020-03-18 RX ORDER — SODIUM CHLORIDE 9 MG/ML
INJECTION, SOLUTION INTRAVENOUS CONTINUOUS
Status: DISCONTINUED | OUTPATIENT
Start: 2020-03-18 | End: 2020-03-18

## 2020-03-18 RX ORDER — ATORVASTATIN CALCIUM 40 MG/1
40 TABLET, FILM COATED ORAL
Status: DISCONTINUED | OUTPATIENT
Start: 2020-03-18 | End: 2020-03-20

## 2020-03-18 RX ORDER — EZETIMIBE 10 MG/1
10 TABLET ORAL DAILY
Status: DISCONTINUED | OUTPATIENT
Start: 2020-03-18 | End: 2020-03-20

## 2020-03-18 RX ORDER — ALBUTEROL SULFATE 90 UG/1
2 AEROSOL, METERED RESPIRATORY (INHALATION) EVERY 4 HOURS PRN
Status: DISCONTINUED | OUTPATIENT
Start: 2020-03-18 | End: 2020-03-20

## 2020-03-18 RX ORDER — CLOPIDOGREL BISULFATE 75 MG/1
75 TABLET ORAL DAILY
Status: DISCONTINUED | OUTPATIENT
Start: 2020-03-18 | End: 2020-03-20

## 2020-03-18 RX ORDER — VANCOMYCIN HYDROCHLORIDE 125 MG/1
250 CAPSULE ORAL EVERY 6 HOURS
Status: DISCONTINUED | OUTPATIENT
Start: 2020-03-18 | End: 2020-03-20

## 2020-03-18 RX ORDER — ASPIRIN 81 MG/1
81 TABLET ORAL DAILY
Status: DISCONTINUED | OUTPATIENT
Start: 2020-03-18 | End: 2020-03-20

## 2020-03-18 NOTE — CM/SW NOTE
SAL received MDO for POLST. SAL requested Adalgisa SANTOS pace POLST form in chart. MD to discuss w/family, fill out middle section of POLST form, and sign prior to SW/HUANG/RN witnessing POLST form.     SW/CM to remain available for support and/or discharge plann

## 2020-03-18 NOTE — TELEPHONE ENCOUNTER
To Dr. Michelle Pierre---    Spoke to patients son (ok per hippjulien) who reports patients symptoms have worsened per assisted living RN. States patient is constantly incontinent of stool and her mental status is declining. States she is becoming more confused.  Reports p

## 2020-03-18 NOTE — TELEPHONE ENCOUNTER
Patient son Randy Alpers is calling she went to ER on 3/15, she was diagnosed with C-Diff  She is getting worse, what should she do  The nurse at assisted living checked on her and thought she should go back to rehab.   Her facility is on lock down he can't get t

## 2020-03-18 NOTE — TELEPHONE ENCOUNTER
Pt was previously at [facility] home with norovirus, then uti, then cdiff x 2. Then was discharged to [facility] in [place]  Pt was taken to ER for eval of diarrhea. Dx with cdiff. Per son, pt is more confused and still with very loose stools.      Discu

## 2020-03-19 PROBLEM — A49.8 CLOSTRIDIUM DIFFICILE INFECTION: Status: ACTIVE | Noted: 2020-03-19

## 2020-03-19 PROCEDURE — 99222 1ST HOSP IP/OBS MODERATE 55: CPT | Performed by: INTERNAL MEDICINE

## 2020-03-19 RX ORDER — GABAPENTIN 100 MG/1
100 CAPSULE ORAL 3 TIMES DAILY
Status: DISCONTINUED | OUTPATIENT
Start: 2020-03-19 | End: 2020-03-20

## 2020-03-19 RX ORDER — QUETIAPINE 25 MG/1
25 TABLET, FILM COATED ORAL NIGHTLY
Status: DISCONTINUED | OUTPATIENT
Start: 2020-03-19 | End: 2020-03-20

## 2020-03-19 NOTE — PLAN OF CARE
Problem: Patient/Family Goals  Goal: Patient/Family Long Term Goal  Description  Patient's Long Term Goal: To go back home    Interventions:  - POC updated  - See additional Care Plan goals for specific interventions   Outcome: Progressing  Goal: Patient restriction as ordered  - Instruct patient on fluid and nutrition restrictions as appropriate  Outcome: Progressing     Problem: MUSCULOSKELETAL - ADULT  Goal: Return mobility to safest level of function  Description  INTERVENTIONS:  - Assess patient stabi

## 2020-03-19 NOTE — CM/SW NOTE
Patient failed inpatient criteria. Second level of review completed and supports observation. UR committee in agreement. Discussed with Dr. Estela Bhatti  who approves observation status. MOON given to the patient and order written.   Discussed with patient, u

## 2020-03-19 NOTE — CM/SW NOTE
BPCI DIRK Proposal:  Met with patient at bedside to explain the BPCI/Medicare program. Patient was enrolled under . BPCI/Medicare Letter provided.     [address]    [place]    Ambulatory Status: Caregiver needed    Activit

## 2020-03-19 NOTE — PLAN OF CARE
Problem: Patient/Family Goals  Goal: Patient/Family Long Term Goal  Description  Patient's Long Term Goal: To go back home    Interventions:  - POC updated  - See additional Care Plan goals for specific interventions   Outcome: Progressing  Goal: Patient

## 2020-03-19 NOTE — H&P
[facility]    History and Physical    [place] Patient Status:  Inpatient    1934 MRN [id number]   Location [facility] 5SW/SE Attending Donald Davis DO   Hosp Day # 1 PCP Lianne Terrell DO     Date:  3/19/2020  D LAMINECTOMY  8/7/14    L2-S1 decompressive laminectomy with bilat foraminotomies.  Dr. Nikole Holcomb   • Fede Thompson 54 UNLISTED     • TONSILLECTOMY       Family History   Problem Relation Age of Onset   • Heart Disorder Father    • Other ( daily. Albuterol Sulfate HFA (PROAIR HFA) 108 (90 Base) MCG/ACT Inhalation Aero Soln, Inhale 2 puffs into the lungs every 4 (four) hours as needed for Wheezing.   Spacer/Aero-Holding Chambers (AEROCHAMBER MV) Does not apply Misc, Use with inhaler  Cholecal 03/18/2020    CREATSERUM 0.97 03/18/2020    BUN 8 03/18/2020     03/18/2020    K 3.6 03/18/2020     03/18/2020    CO2 24.0 03/18/2020    GLU 99 03/18/2020    CA 8.8 03/18/2020    ALB 2.6 (L) 02/15/2020    ALKPHO 57 02/15/2020    BILT 0.3 02/15/ SFA, popliteal and tibioperoneal trunk was unsuccessful per Dr Zaria Crawford; off Pletal 100 mg po BID; currently on ASA 81 mg po qD and Plavix 75 mg po qD     Renal mass  renal US in Jan 2018 showing anechoic cyst measuring 1.8 x 1.5 x 1.5cm cyst at the upper po

## 2020-03-19 NOTE — CM/SW NOTE
COND 44: Patient failed Inpatient criteria. Second level of review completed and supports Observation. UR committee in agreement. Discussed with Dr Fouzia Neff approves.

## 2020-03-20 ENCOUNTER — TELEPHONE (OUTPATIENT)
Dept: INTERNAL MEDICINE CLINIC | Facility: CLINIC | Age: 85
End: 2020-03-20

## 2020-03-20 VITALS
DIASTOLIC BLOOD PRESSURE: 94 MMHG | TEMPERATURE: 98 F | BODY MASS INDEX: 26 KG/M2 | HEART RATE: 87 BPM | SYSTOLIC BLOOD PRESSURE: 125 MMHG | WEIGHT: 143.81 LBS | RESPIRATION RATE: 18 BRPM | OXYGEN SATURATION: 93 %

## 2020-03-20 NOTE — PROGRESS NOTES
Spoke with son- who is aware of dc. Also did want [facility] to be set up has no prefrence in which company- RN spoke with Leigh Pandya who had talked to him earlier who will contact him and set up [place] [phone].

## 2020-03-20 NOTE — DISCHARGE SUMMARY
[facility] AND [facility]  Discharge Summary    Courtney Winter Patient Status:  Observation    1934 MRN [id number]   Location [facility] 5SW/SE Attending Omid Morales DO   Hosp Day # 1 PCP Peter Crawford DO     Date of Admission: 3/18/2020 returned to her assisted living at [facility] in [facility].     This morning, she reports cramping abdominal pain. Denies fever. Did eat a good breakfast.     Hospital Course:   Pt was treated with vancomycin 250mg qid and diarrhea improved.  Pt was dis Tab  Take 1 tablet (40 mg total) by mouth once daily. Qty: 90 tablet Refills: 3    Albuterol Sulfate HFA (PROAIR HFA) 108 (90 Base) MCG/ACT Inhalation Aero Soln  Inhale 2 puffs into the lungs every 4 (four) hours as needed for Wheezing.   Qty: 1 Inhaler Re

## 2020-03-20 NOTE — OCCUPATIONAL THERAPY NOTE
OCCUPATIONAL THERAPY EVALUATION - INPATIENT     Room Number: 568/568-A  Evaluation Date: 3/20/2020  Type of Evaluation: Initial       Physician Order: IP Consult to Occupational Therapy  Reason for Therapy: ADL/IADL Dysfunction and Discharge Planning    OC Treatment Plan: Balance activities; Energy conservation/work simplification techniques;ADL training;Functional transfer training; Endurance training;Patient/Family training;Patient/Family education; Compensatory technique education       OCCUPATIONAL THERAPY COGNITION  Overall Cognitive Status:  Impaired    Communication: WNL    Behavioral/Emotional/Social: Cooperative, pleasant     RANGE OF MOTION   Upper extremity ROM is within functional limits     STRENGTH ASSESSMENT  Upper extremity strength is within

## 2020-03-20 NOTE — PHYSICAL THERAPY NOTE
PHYSICAL THERAPY EVALUATION - INPATIENT     Room Number: 568/568-A  Evaluation Date: 3/20/2020  Type of Evaluation: Initial   Physician Order: See Comment for Specific Order(weakness, current C Diff)    Presenting Problem: p/w weakness, C Diff  Reason for Short Form. Research supports that patients with this level of impairment may benefit from home with [facility]. Anticipate pt is close to her functional baseline however unclear as pt is unreliable storyteller d/t cognitive deficits.  Do anticipate pt would do b SURGERY PROCEDURE UNLISTED     • TONSILLECTOMY       HOME SITUATION  Type of Home: Assisted living facility   Home Layout: One level  Stairs to Enter : 0  Stairs to Bedroom: 0  Lives With: Caregiver part-time; Alone  Drives: No  Patient Owned Equipment: Cl Bliss (including adjusting bedclothes, sheets and blankets)?: A Little   -   Sitting down on and standing up from a chair with arms (e.g., wheelchair, bedside commode, etc.): A Little   -   Moving from lying on back to sitting on the side of the bed?: A Little independence with home activity/exercise instructions provided to patient in preparation for discharge.    Goal #4   Current Status    Goal #5    Goal #5   Current Status    Goal #6    Goal #6  Current Status

## 2020-03-20 NOTE — HOME CARE LIAISON
Received phone call from [facility], stated that son of patient is now agreeable to [facility]. Spoke to son and confirmed that he is now agreeable to [facility] on discharge. Will continue to follow. Received referral from Tereso Tuttle for home health care services.

## 2020-03-20 NOTE — PROGRESS NOTES
Patient dc home. IV not found. Understands to follow up with PCP in 1 week.  Patient understands no new meds to continue taking vanco that was prescribed at the outpt setting, RN did clarify about fosomax- which was resumed home regimen per pt takes on thur

## 2020-03-20 NOTE — PLAN OF CARE
Pt denies pain, sob, n/v. Medication reviewed. Call light in reach. Safety maintained. We will continue to monitor.   Problem: Patient/Family Goals  Goal: Patient/Family Long Term Goal  Description  Patient's Long Term Goal: To go back home    Interventions Obtain nutritional consult as needed  - Optimize oral hygiene and moisture  - Encourage food from home; allow for food preferences  - Enhance eating environment  Outcome: Progressing     Problem: METABOLIC/FLUID AND ELECTROLYTES - ADULT  Goal: Electrolytes

## 2020-03-20 NOTE — TELEPHONE ENCOUNTER
Patient told son that she will be discharged. Son needs to know details, as patient has a memory issue. Son has an issue with her being discharged.     Call Jose Elias, son at:  [phone]

## 2020-03-23 ENCOUNTER — PATIENT OUTREACH (OUTPATIENT)
Dept: CASE MANAGEMENT | Age: 85
End: 2020-03-23

## 2020-03-23 ENCOUNTER — TELEPHONE (OUTPATIENT)
Dept: INTERNAL MEDICINE CLINIC | Facility: CLINIC | Age: 85
End: 2020-03-23

## 2020-03-23 DIAGNOSIS — A49.8 CLOSTRIDIUM DIFFICILE INFECTION: ICD-10-CM

## 2020-03-23 DIAGNOSIS — Z02.9 ENCOUNTERS FOR ADMINISTRATIVE PURPOSE: ICD-10-CM

## 2020-03-23 PROCEDURE — 1111F DSCHRG MED/CURRENT MED MERGE: CPT

## 2020-03-23 NOTE — TELEPHONE ENCOUNTER
As FYI to DR. RAUSCH - called Tom Moncada from [facility] and gave verbal approval for plan of care per protocol - verbalized understanding

## 2020-03-23 NOTE — PROGRESS NOTES
NCM s/w patient who stated that this was not a good time. She agreed to a return call at another time.

## 2020-03-23 NOTE — TELEPHONE ENCOUNTER
Maureen @ [facility] Joao :    Have home PT twice a week for three weeks. Just FYI. No call back needed.    Best call back: [phone]

## 2020-03-23 NOTE — TELEPHONE ENCOUNTER
Adrienne from [facility]. H.H. is calling pt was opened for [facility]. services today ph.  # [phone]  Routed to clinical

## 2020-03-24 RX ORDER — QUETIAPINE 25 MG/1
25 TABLET, FILM COATED ORAL NIGHTLY
Qty: 90 TABLET | Refills: 0 | Status: SHIPPED | OUTPATIENT
Start: 2020-03-24 | End: 2020-05-26

## 2020-03-24 NOTE — PROGRESS NOTES
Initial Post Discharge Follow Up   Discharge Date: 3/20/20  Contact Date: 3/23/2020    Consent Verification:  Assessment Completed With: Patient  HIPAA Verified?   Yes    Discharge Dx:   C-Diff   diarrhea   Clostridium difficile infection        General: Inhaler 2   • alendronate 70 MG Oral Tab Take 1 tablet (70 mg total) by mouth once a week. 12 tablet 3   • ezetimibe (ZETIA) 10 MG Oral Tab Take 1 tablet (10 mg total) by mouth daily.  90 tablet 3   • aspirin 81 MG Oral Tab Take 1 tablet (81 mg total) by mo TE      Have you made all of your follow up appointments? no    Is there any reason as to why you cannot make your appointments? They are encouraging no one to leave (pt lives in [place]).  no car      NCM Reviewed upcoming Specialist Appt with patient     Not

## 2020-03-24 NOTE — TELEPHONE ENCOUNTER
Patient requesting refill for her Seroquel. She was discharged from [address] [place] [address] on 3/20/2020. She is in TCM. Per dc instructions, she is to see PCP in 1 week after discharge.  She is requesting if this can be done over the phone as she lives in [place] and they are enco

## 2020-03-24 NOTE — TELEPHONE ENCOUNTER
To Dr. Екатерина Sanderson to advise-  Also pended refill for Seroquel as requested by pt for MD to review.

## 2020-03-24 NOTE — TELEPHONE ENCOUNTER
As FYI to DR. MIRACLE Felton from [facility] and gave verbal approval for plan of care - left on confidential VM

## 2020-03-24 NOTE — TELEPHONE ENCOUNTER
[address] [phone]  Occupational therapy       OT is for two times a week for two weeks. And the one time a week for two weeks. Also like to request a home health aid.

## 2020-03-26 ENCOUNTER — TELEPHONE (OUTPATIENT)
Dept: INTERNAL MEDICINE CLINIC | Facility: CLINIC | Age: 85
End: 2020-03-26

## 2020-03-26 NOTE — TELEPHONE ENCOUNTER
To Dr. Tatiana Finn to advise please-   Pt's son is wondering when to expect c. Diff \"to clear\"- pt has been on Vanco since 3/16.  Pt's son reports patient is not having diarrhea for \"about a week\"

## 2020-03-26 NOTE — TELEPHONE ENCOUNTER
Pt have Cdif and is on medication been on for about a week in a half. Son is asking by when is the Cdif is cleared. HH wont come till she been cleared for it.

## 2020-03-27 NOTE — TELEPHONE ENCOUNTER
They can see her as her diarrhea resolved within 48 hours of treatment. She should continue the taper dose until the bottle is completed. Nursing-please call home health to see what they can do.

## 2020-03-27 NOTE — TELEPHONE ENCOUNTER
Spoke to pt son (ok per hippa) and advised on MD message below; son verbalized understanding. Son reports HH is already scheduled to come see pt. No issues with HH at this time.

## 2020-04-22 ENCOUNTER — TELEPHONE (OUTPATIENT)
Dept: INTERNAL MEDICINE CLINIC | Facility: CLINIC | Age: 85
End: 2020-04-22

## 2020-04-22 NOTE — TELEPHONE ENCOUNTER
[facility] Ajitbong Frank [phone]    Asking if a speech therapy can come see pt.

## 2020-04-27 RX ORDER — VANCOMYCIN HYDROCHLORIDE 250 MG/1
CAPSULE ORAL
Qty: 90 CAPSULE | Refills: 0 | OUTPATIENT
Start: 2020-04-27

## 2020-04-27 NOTE — TELEPHONE ENCOUNTER
Likely computer generated-- refused    Requested Prescriptions     Refused Prescriptions Disp Refills   • VANCOMYCIN  MG Oral Cap [Pharmacy Med Name: VANCOMYCIN  MG CAPSULE] 90 capsule 0     Sig: TAKE ONE CAPSULE 4 TIMES DAILY X 2 WEEKS, THEN

## 2020-05-05 ENCOUNTER — HOSPITAL ENCOUNTER (INPATIENT)
Facility: HOSPITAL | Age: 85
LOS: 15 days | Discharge: NURSING, REHAB OR LONG-TERM CARE | DRG: 098 | End: 2020-05-20
Attending: EMERGENCY MEDICINE | Admitting: INTERNAL MEDICINE
Payer: MEDICARE

## 2020-05-05 ENCOUNTER — APPOINTMENT (OUTPATIENT)
Dept: CT IMAGING | Facility: HOSPITAL | Age: 85
DRG: 098 | End: 2020-05-05
Attending: EMERGENCY MEDICINE
Payer: MEDICARE

## 2020-05-05 DIAGNOSIS — R41.82 ALTERED MENTAL STATUS, UNSPECIFIED ALTERED MENTAL STATUS TYPE: ICD-10-CM

## 2020-05-05 DIAGNOSIS — N39.0 URINARY TRACT INFECTION WITHOUT HEMATURIA, SITE UNSPECIFIED: Primary | ICD-10-CM

## 2020-05-05 PROCEDURE — 70450 CT HEAD/BRAIN W/O DYE: CPT | Performed by: EMERGENCY MEDICINE

## 2020-05-05 RX ORDER — ENOXAPARIN SODIUM 100 MG/ML
40 INJECTION SUBCUTANEOUS NIGHTLY
Status: DISCONTINUED | OUTPATIENT
Start: 2020-05-05 | End: 2020-05-10

## 2020-05-05 RX ORDER — CLOPIDOGREL BISULFATE 75 MG/1
75 TABLET ORAL DAILY
Status: DISCONTINUED | OUTPATIENT
Start: 2020-05-05 | End: 2020-05-06

## 2020-05-05 RX ORDER — ENOXAPARIN SODIUM 100 MG/ML
40 INJECTION SUBCUTANEOUS DAILY
Status: DISCONTINUED | OUTPATIENT
Start: 2020-05-05 | End: 2020-05-05

## 2020-05-05 RX ORDER — VANCOMYCIN HYDROCHLORIDE 125 MG/1
125 CAPSULE ORAL DAILY
Status: DISCONTINUED | OUTPATIENT
Start: 2020-05-05 | End: 2020-05-06

## 2020-05-05 RX ORDER — ATORVASTATIN CALCIUM 40 MG/1
40 TABLET, FILM COATED ORAL
Status: DISCONTINUED | OUTPATIENT
Start: 2020-05-05 | End: 2020-05-06

## 2020-05-05 RX ORDER — SODIUM CHLORIDE 9 MG/ML
INJECTION, SOLUTION INTRAVENOUS CONTINUOUS
Status: DISCONTINUED | OUTPATIENT
Start: 2020-05-05 | End: 2020-05-08

## 2020-05-05 RX ORDER — ASPIRIN 81 MG/1
81 TABLET ORAL DAILY
Status: DISCONTINUED | OUTPATIENT
Start: 2020-05-05 | End: 2020-05-06

## 2020-05-05 RX ORDER — EZETIMIBE 10 MG/1
10 TABLET ORAL DAILY
Status: DISCONTINUED | OUTPATIENT
Start: 2020-05-05 | End: 2020-05-06

## 2020-05-05 NOTE — SLP NOTE
ADULT SWALLOWING EVALUATION    ASSESSMENT    ASSESSMENT/OVERALL IMPRESSION:      PPE REQUIRED. THIS SLP WORE GOWN, GLOVES AND DROPLET MASK. HANDS SANITIZED/WASHED UPON ENTRANCE/EXIT. This BSE was ordered d/t Pt failing swallowing screen in ER.  No PMH f/u x3 sessions/meals for dysphagia treatment/aspiration precautions. Focus will be to ensure safe tolerance of diet and to reinforce all swallowing precautions/strategies to improve safety/efficiency of the swallow. Will monitor for any new CXR results.  D NPO      OBJECTIVE   ORAL MOTOR EXAMINATION  Dentition: Edentulous  Symmetry: Unable to assess  Strength: Unable to assess  Tone: Unable to assess  Range of Motion: Unable to assess  Rate of Motion: Unable to assess    Voice Quality: Clear  Respiratory Sta

## 2020-05-05 NOTE — CM/SW NOTE
Patient comes from [address] [place] [address] and has been tested for COVID-19. This patient's COVID-19 test is negative. At the time of this note, there are no known positive cases in that facility.     Luci Koo RN    Ext [phone]

## 2020-05-05 NOTE — ED NOTES
Pt's verbalizations are clearer at this time. Pt stated \"can't use this arm\" when RN attempted to place BP cuff on L upper extremity. Pt recognized family member and is speaking in full sentences at this time.

## 2020-05-05 NOTE — ED INITIAL ASSESSMENT (HPI)
Pt brought by EMS from [facility] assisted living. Pt did not come down for breakfast this morning, so staff checked on pt and she was found on floor with altered mental status.

## 2020-05-05 NOTE — ED PROVIDER NOTES
Patient Seen in: [facility] AND [facility] Emergency Department      History   Patient presents with:  Altered Mental Status  Trauma 1 & 2    Stated Complaint: AMS, unwitnessed fall    HPI    Pt is 79 yo F who arrives by EMS from custodial for AMS.  Pt was found on flaquita Current:BP (!) 176/80   Pulse 85   Temp 97.5 °F (36.4 °C) (Oral)   Resp 16   Wt 81.6 kg   SpO2 98%   BMI 32.92 kg/m²         Physical Exam    GENERAL: pt confused.  Not following commands  HEENT: MMM, EOMI, PERRL  Neck: supple, non tender, no meningea the individual orders. SCAN SLIDE   RAINBOW DRAW BLUE   RAINBOW DRAW LAVENDER   RAINBOW DRAW LIGHT GREEN   RAINBOW DRAW GOLD   URINE CULTURE, ROUTINE     EKG    Rate, intervals and axes as noted on EKG Report.   Rate: 90  Rhythm: NSR  Reading: no SANYA

## 2020-05-06 ENCOUNTER — APPOINTMENT (OUTPATIENT)
Dept: MRI IMAGING | Facility: HOSPITAL | Age: 85
DRG: 098 | End: 2020-05-06
Attending: INTERNAL MEDICINE
Payer: MEDICARE

## 2020-05-06 ENCOUNTER — APPOINTMENT (OUTPATIENT)
Dept: GENERAL RADIOLOGY | Facility: HOSPITAL | Age: 85
DRG: 098 | End: 2020-05-06
Attending: INTERNAL MEDICINE
Payer: MEDICARE

## 2020-05-06 ENCOUNTER — TELEPHONE (OUTPATIENT)
Dept: INTERNAL MEDICINE CLINIC | Facility: CLINIC | Age: 85
End: 2020-05-06

## 2020-05-06 PROCEDURE — 99223 1ST HOSP IP/OBS HIGH 75: CPT | Performed by: OTHER

## 2020-05-06 PROCEDURE — 71045 X-RAY EXAM CHEST 1 VIEW: CPT | Performed by: INTERNAL MEDICINE

## 2020-05-06 PROCEDURE — 70551 MRI BRAIN STEM W/O DYE: CPT | Performed by: INTERNAL MEDICINE

## 2020-05-06 RX ORDER — LORAZEPAM 2 MG/ML
0.5 INJECTION INTRAMUSCULAR EVERY 4 HOURS PRN
Status: DISCONTINUED | OUTPATIENT
Start: 2020-05-06 | End: 2020-05-10

## 2020-05-06 RX ORDER — LORAZEPAM 2 MG/ML
0.5 INJECTION INTRAMUSCULAR ONCE
Status: COMPLETED | OUTPATIENT
Start: 2020-05-06 | End: 2020-05-06

## 2020-05-06 RX ORDER — LORAZEPAM 2 MG/ML
0.5 INJECTION INTRAMUSCULAR ONCE
Status: DISCONTINUED | OUTPATIENT
Start: 2020-05-06 | End: 2020-05-11

## 2020-05-06 RX ORDER — METRONIDAZOLE 500 MG/100ML
500 INJECTION, SOLUTION INTRAVENOUS EVERY 8 HOURS
Status: DISCONTINUED | OUTPATIENT
Start: 2020-05-06 | End: 2020-05-07

## 2020-05-06 RX ORDER — HALOPERIDOL 2 MG/ML
1 SOLUTION ORAL EVERY 6 HOURS PRN
Status: DISCONTINUED | OUTPATIENT
Start: 2020-05-06 | End: 2020-05-20

## 2020-05-06 NOTE — WOUND PROGRESS NOTE
WOUND CARE NOTE    History:  Past Medical History:   Diagnosis Date   • Arthritis    • Back problem    • Cancer ([facility] [address]     Kidney   • Compression fracture of L1 lumbar vertebra (HCC)    • COPD (chronic obstructive pulmonary disease) (HCC)    • DVT (deep v shift   Location: Bilateral buttock cheeks  Cleansing  Pamper wipes  Topical Sensicare 3 skin barrier cream( from wound care sx)  Frequency bid and prn     Discharge Recommendations:    to assist with discharge planning, the pt.  is from [facility]  [phone]

## 2020-05-06 NOTE — TELEPHONE ENCOUNTER
Spoke with Verner Stabler and notified her that [facility] orders were approved (per protocol). FYI to Dr. Brooks Loyd.

## 2020-05-06 NOTE — CM/SW NOTE
SAL received MDO for POLST form. SAL called and spoke with [address] [place] [address] who states there Is no code status information on file. Per chart review pt has been listed as DNR and per previous notes also listed as DNR.      Pt is currently agitated -

## 2020-05-06 NOTE — SLP NOTE
SPEECH DAILY NOTE - INPATIENT    ASSESSMENT & PLAN   ASSESSMENT  Patient alert upon arrival, seen for dysphagia follow up. RN reports patient is poorly cooperative, minimal intake of PO and was unable to administer medications due to agitation.     Patient continue to fluctuate given underlying dementia and possible CVA. Continue with 1:1 Supervision with allowance of patient to self feed if this improves patient's intake. Will continue to monitor for tolerance.     Diet Recommendations - Solids: Mechanical of Visits to Meet Established Goals: 2    Session: 1    If you have any questions, please contact Aleena Villar M.S., [address]  [address] [facility]

## 2020-05-06 NOTE — PROGRESS NOTES
[facility] Pharmacy Note:  Renal Adjustment for acyclovir (ZOVIRAX)    Dalila Maciel is a 80year old female who has been prescribed acyclovir (ZOVIRAX) 10 mg/kg every 8 hrs.   CrCl is estimated creatinine clearance is 38.3 mL/min (based on SCr of 0.85

## 2020-05-06 NOTE — TELEPHONE ENCOUNTER
Estefani Hodge from [facility] calling for orders for 2 additional visits, home health aid to help with showers and re certification for home healthcare.     Estefani Hodge for Res [phone]

## 2020-05-06 NOTE — TELEPHONE ENCOUNTER
Placido Parekh is calling to inform Dr. Pranay Bird that his mom is in [facility] and she is being combative, screaming, pushing, and ripping out her I.V. He wants to know if she can be prescribed a sedative ph.  # [phone]   Routed high to clinical

## 2020-05-06 NOTE — PLAN OF CARE
Patient confused. MRI complete. C. Diff positive. Max assist. Delories Gayer changed, IV accessed lost, attempted 3 times. PICC team called.    Problem: Delirium  Goal: Minimize duration of delirium  Description  Interventions:  - Encourage use of hearing aids, eye RISK FOR INFECTION - ADULT  Goal: Absence of fever/infection during anticipated neutropenic period  Description  INTERVENTIONS  - Monitor WBC  - Administer growth factors as ordered  - Implement neutropenic guidelines  Outcome: Progressing     Problem: SAF harm  - Notify patient and family of reasons restraints applied  - Assess for any contributing factors to confusion (electrolyte disturbances, delirium, medications)  - Discontinue any unnecessary medical devices as soon as possible  - Assess the patient's

## 2020-05-06 NOTE — PLAN OF CARE
Pt's vital signs stable. Disoriented x4. Doesn't follow commands. Moves all extremities. On room air. Tolerating pureed/nectar thickened liquids although very poor appetite. Repositioned frequently. Blanchable redness to jacob-area and sacrum.  Max assist. L assistance  - Assess pain using appropriate pain scale  - Administer analgesics based on type and severity of pain and evaluate response  - Implement non-pharmacological measures as appropriate and evaluate response  - Consider cultural and social influenc patient/family/discharge partner  - Complete POLST form as appropriate  - Assess patient's ability to be responsible for managing their own health  - Refer to Case Management Department for coordinating discharge planning if the patient needs post-hospital

## 2020-05-06 NOTE — PROGRESS NOTES
Patient extremely combative. Hitting, kicking, attempting to bite staff. Attempting to pull haddad out. Soft restraints placed. Orders placed. Q 2 hour restraint documentation per protocol. Will continue to evaluate.

## 2020-05-06 NOTE — H&P
[facility]    History and Physical    Li Spring Patient Status:  Inpatient    1934 MRN [id number]   Location [facility] 4W/SW/SE Attending Charley Brittle,    Hosp Day # 1 FELTON Justin DO     Date:  2020 laminectomy with bilat foraminotomies.  Dr. Frieda Ambriz   • [address] [facility]     • TONSILLECTOMY       Family History   Problem Relation Age of Onset   • Heart Disorder Father    • Other (Other[Other]) Mother         colon cancer TABLET BY MOUTH 3  TIMES A DAY AS NEEDED FOR  ITCHING  Spacer/Aero-Holding Chambers (AEROCHAMBER MV) Does not apply Misc, Use with inhaler  Cholecalciferol (VITAMIN D) 1000 units Oral Tab, Take 1 tablet by mouth daily.   Calcium Carbonate-Vit D-Min (CALCIUM 100 05/05/2020    B12 564 10/23/2017     Ct Brain Or Head (69624)    Result Date: 5/5/2020  CONCLUSION:  1. No acute intracranial process by noncontrast CT technique.  2. Mild senescent changes of parenchymal volume loss with sequela of chronic microangiopa showed long  of the distal SFA, popliteal and TP trunk.  There is no recanalization other than peroneal artery which gives collaterals to anterior tibial and posterior tibial; attempted opening of  of the distal right SFA, popliteal and tibioperoneal

## 2020-05-07 ENCOUNTER — TELEPHONE (OUTPATIENT)
Dept: INTERNAL MEDICINE CLINIC | Facility: CLINIC | Age: 85
End: 2020-05-07

## 2020-05-07 PROCEDURE — 99233 SBSQ HOSP IP/OBS HIGH 50: CPT | Performed by: OTHER

## 2020-05-07 PROCEDURE — 99232 SBSQ HOSP IP/OBS MODERATE 35: CPT | Performed by: INTERNAL MEDICINE

## 2020-05-07 RX ORDER — VANCOMYCIN HYDROCHLORIDE 125 MG/1
250 CAPSULE ORAL EVERY 6 HOURS
Status: DISCONTINUED | OUTPATIENT
Start: 2020-05-07 | End: 2020-05-07

## 2020-05-07 NOTE — PLAN OF CARE
Problem: Delirium  Goal: Minimize duration of delirium  Description  Interventions:  - Encourage use of hearing aids, eye glasses  - Promote highest level of mobility daily  - Provide frequent reorientation  - Promote wakefulness i.e. lights on, blinds o period  Description  INTERVENTIONS  - Monitor WBC  - Administer growth factors as ordered  - Implement neutropenic guidelines  Outcome: Progressing     Problem: SAFETY ADULT - FALL  Goal: Free from fall injury  Description  INTERVENTIONS:  - Assess pt freq

## 2020-05-07 NOTE — PROGRESS NOTES
[facility]    Progress Note    Chloe Claire Patient Status:  Inpatient    1934 MRN [id number]   Location [facility] 4W/SW/SE Attending Junior Osmel DO   Hosp Day # 2 PCP Laurie Soto DO       Subjective:   Luz Maria Lara 05/06/2020    .0 (L) 05/06/2020    CREATSERUM 0.85 05/06/2020    BUN 12 05/06/2020     05/06/2020    K 4.3 05/06/2020     05/06/2020    CO2 22.0 05/06/2020    GLU 88 05/06/2020    CA 9.3 05/06/2020    ALB 2.6 (L) 02/15/2020    ALKPHO 57 (cpt=71045)    Result Date: 5/6/2020  CONCLUSION:   Chronic reticular markings suggestive of scarring without acute cardiopulmonary abnormality. Enlarged cardiomediastinal silhouette, unchanged.    Dictated by (CST): Ileana Phoenix MD on 5/06/2020 at 9:07 PM

## 2020-05-07 NOTE — PHYSICAL THERAPY NOTE
Consulted w/ RN. Per RN, pt is still confused and disoriented. She is on restraints. However, she is calmer this morning than she was yesterday (hitting, kicking, attempting to bite staff, pulling out lines).  Pt having difficulty following commands still p

## 2020-05-07 NOTE — PLAN OF CARE
Problem: Delirium  Goal: Minimize duration of delirium  Description  Interventions:  - Encourage use of hearing aids, eye glasses  - Promote highest level of mobility daily  - Provide frequent reorientation  - Promote wakefulness i.e. lights on, blinds o Provide assistive devices as appropriate  - Consider OT/PT consult to assist with strengthening/mobility  - Encourage toileting schedule  Outcome: Progressing     Problem: DISCHARGE PLANNING  Goal: Discharge to home or other facility with appropriate resou is a DNR,  will continue to monitor pt for safety

## 2020-05-07 NOTE — SLP NOTE
SPEECH DAILY NOTE - INPATIENT    ASSESSMENT & PLAN   ASSESSMENT  Pt seen to monitor tolerance of PO diet and train compensatory strategies. Pt was alert, afebrile and on room air at 94%. Patient was presented trials of current diet.  Straw ir room so that sessions.     Pt was not able to understand alternating consistencies. Straw was used without difficulty. Will eliminate no straw restriction.   In Progress   Goal #3 The patient will tolerate trial upgrade of pureed  consistency and THIN liquids without

## 2020-05-07 NOTE — PROGRESS NOTES
[facility]    Progress Note    Aimee Contreras Patient Status:  Inpatient    1934 MRN [id number]   Location [facility] 4W/SW/SE Attending Coleman Mckeon DO   Hosp Day # 2 PCP Freddie Azul DO       SUBJECTIVE:  Pt easi Mri Brain (cpt=70551)    Result Date: 5/6/2020  CONCLUSION:  1. Multifocal slightly expansile T2 hyperintense signal abnormality involving the left posterior temporal and left occipital lobes with findings suggesting trace petechial type hemorrhage in status, unspecified altered mental status type  Unclear etiology as this is a change from her baseline. Initial head CT negative for acute findings.  MRI: \"Multifocal slightly expansile T2 hyperintense signal abnormality involving the left posterior tempor collaterals to anterior tibial and posterior tibial; attempted opening of  of the distal right SFA, popliteal and tibioperoneal trunk was unsuccessful per Dr Leonarda Goff; off Pletal 100 mg po BID; currently on ASA 81 mg po qD and Plavix 75 mg po qD-->held fo

## 2020-05-07 NOTE — CONSULTS
[facility]    PATIENT'S NAME: Denis Holman   ATTENDING PHYSICIAN: Shivani Coley DO   CONSULTING PHYSICIAN: Roderick Marie MD   PATIENT ACCOUNT#:   [id number]    LOCATION:  [address] [place] [address] #:   [id number]       DATE Encephalopathic. Concern for left temporal lobe stroke or another possibility of herpes simplex encephalitis. I spoke with Josefina Canales and discussed these concerns stating she needs MRI scan, EEG. I discussed with Dr. Pj Mason.   We will start her on acycl

## 2020-05-07 NOTE — CONSULTS
[facility] ID CONSULT NOTE    Marc Gore Patient Status:  Inpatient    1934 MRN [id number]   Location [facility] 4W/SW/SE Attending Mp Velásquez DO   Hosp Day # 2 PCP Davon Rivera DO       Reason for Con decompressive laminectomy with bilat foraminotomies.  Dr. Nighat Zuniga   • [address] [facility]     • TONSILLECTOMY       Family History   Problem Relation Age of Onset   • Heart Disorder Father    • Other (Other[Other]) Mother speech  HEENT: Moist mucous membranes. No oral lesions  Neck: No lymphadenopathy. Supple. Cardiovascular: RRR  Respiratory: CTAB  Abdomen: Soft, nontender, nondistended.    Musculoskeletal: No edema noted  Integument: BUE ecchymosis  Lines: PIV+  : Fole Reviewed labs, micro, imaging reports, available old records. -  Case d/w patient, RN. Thank you for allowing us to participate in the care of this patient. Please do not hesitate to call if you have any questions.    We will continue to follow with you

## 2020-05-08 PROCEDURE — 99233 SBSQ HOSP IP/OBS HIGH 50: CPT | Performed by: OTHER

## 2020-05-08 RX ORDER — HYDRALAZINE HYDROCHLORIDE 20 MG/ML
5 INJECTION INTRAMUSCULAR; INTRAVENOUS ONCE
Status: COMPLETED | OUTPATIENT
Start: 2020-05-08 | End: 2020-05-08

## 2020-05-08 RX ORDER — QUETIAPINE 25 MG/1
25 TABLET, FILM COATED ORAL NIGHTLY
Status: DISCONTINUED | OUTPATIENT
Start: 2020-05-08 | End: 2020-05-20

## 2020-05-08 RX ORDER — AMLODIPINE BESYLATE 5 MG/1
5 TABLET ORAL DAILY
Status: DISCONTINUED | OUTPATIENT
Start: 2020-05-08 | End: 2020-05-18

## 2020-05-08 RX ORDER — PANTOPRAZOLE SODIUM 40 MG/1
40 TABLET, DELAYED RELEASE ORAL
Status: DISCONTINUED | OUTPATIENT
Start: 2020-05-08 | End: 2020-05-20

## 2020-05-08 NOTE — PROGRESS NOTES
[facility]    Progress Note    Elena Estrella Patient Status:  Inpatient    1934 MRN [id number]   Location [facility] 4W/SW/SE Attending Pippa Lott DO   Hosp Day # 3 PCP Shira Ortega DO       SUBJECTIVE:  Pt more sequela of chronic microangiopathy and large vessel atherosclerosis.    Remote - PS  Dictated by (CST): Graceann Duane, MD on 5/05/2020 at 10:55 AM     Finalized by (CST): Graceann Duane, MD on 5/05/2020 at 10:57 AM          Mri Brain (esb=[id number])    Result Debbie Peterson MD on 5/06/2020 at 9:08 PM                  Assessment and Plan:       Urinary tract infection without hematuria, site unspecified  Continue rocephin IV; continue IVF; Ucx: enterobacter cloacae       Altered mental status, unspecified altered mental distal R common femoral artery, complete occlusion of both popliteal arteries. ; underwent stenting and angioplasty with Dr. Eduin Estes; due to claudication, RLE angiogram performed in Sept 2019 showed long  of the distal SFA, popliteal and TP trunk

## 2020-05-08 NOTE — PROGRESS NOTES
I spoke with her son Viviana Oro over the telephone tonight to give him an update. I discussed my clinical impression, MRI scan findings, plan for spinal tap when okay with radiology. He mentioned her recent recurrent viral, bacterial infections.

## 2020-05-08 NOTE — PLAN OF CARE
Problem: Delirium  Goal: Minimize duration of delirium  Description  Interventions:  - Encourage use of hearing aids, eye glasses  - Promote highest level of mobility daily  - Provide frequent reorientation  - Promote wakefulness i.e. lights on, blinds o Provide assistive devices as appropriate  - Consider OT/PT consult to assist with strengthening/mobility  - Encourage toileting schedule  Outcome: Progressing     Problem: DISCHARGE PLANNING  Goal: Discharge to home or other facility with appropriate resou

## 2020-05-08 NOTE — PROGRESS NOTES
[facility] ID PROGRESS NOTE    Rick Benitescharmaine Patient Status:  Inpatient    1934 MRN [id number]   Location [facility] 4W/SW/SE Attending Archana Bran DO   Hosp Day # 3 PCP Katia Vizcarra DO     Subjective:  RO hematuria, site unspecified     Altered mental status, unspecified altered mental status type     UTI (urinary tract infection)      ASSESSMENT:    Antibiotics: Ceftriaxone  Antiviral: Acyclovir     80year old female with a history of dementia, HTN, recur

## 2020-05-08 NOTE — OCCUPATIONAL THERAPY NOTE
Chart reviewed and attempted OT evaluation. Patient received in bed alert and somewhat restless. She refused to participate in any activity today. Will follow up 5/9.

## 2020-05-08 NOTE — SLP NOTE
SPEECH DAILY NOTE - INPATIENT    ASSESSMENT & PLAN   ASSESSMENT  Patient seen for dysphagia follow up s/p diet advancement to General/thin liquids. RN reports patient has been refusing all PO this date. Lunch tray present.   Patient initially was agitated  The patient will tolerate general diet consistency and THIN liquids   without overt signs or symptoms of aspiration with 100 % accuracy over 1-2 session(s).     Patient tolerated general solids and thin liquids without overt clinical signs or symptoms of

## 2020-05-09 PROCEDURE — 99232 SBSQ HOSP IP/OBS MODERATE 35: CPT | Performed by: OTHER

## 2020-05-09 PROCEDURE — 99232 SBSQ HOSP IP/OBS MODERATE 35: CPT | Performed by: INTERNAL MEDICINE

## 2020-05-09 NOTE — PROGRESS NOTES
Neurology Inpatient Follow-up Note      HPI:     Patient being seen in follow up. Interval notes and workup reviewed.     Patient remains extremely confused and mildly agitated, unable to provide significant additional history aside from the fact that the subtle asymmetric T2 hyperintense   signal abnormality involving the left cerebellar sulci.   Imaging findings raise suspicion for infectious/inflammatory encephalitis with less likely differential considerations including toxic-metabolic insult or multicen minimize interruptions at night, re-orienting cues (e.g. clocks, calendars), familiar staff when possible      Neurology service will continue to follow. Please page with any questions / concerns.     Peter Crawford MD  [place] [phone]

## 2020-05-09 NOTE — PROGRESS NOTES
[facility]    Progress Note    Starlene Libman Patient Status:  Inpatient    1934 MRN [id number]   Location [facility] 4W/SW/SE Attending Briana Estes DO   Hosp Day # 3 PCP Jaswant Peñaloza DO       Subjective:   Best Lopez 05/08/2020    .0 05/08/2020    CREATSERUM 1.04 (H) 05/08/2020    BUN 11 05/08/2020     05/08/2020    K 3.7 05/08/2020     (H) 05/08/2020    CO2 28.0 05/08/2020    GLU 95 05/08/2020    CA 8.5 05/08/2020    ALB 2.6 (L) 02/15/2020    ALKPHO

## 2020-05-09 NOTE — PLAN OF CARE
Problem: Delirium  Goal: Minimize duration of delirium  Description  Interventions:  - Encourage use of hearing aids, eye glasses  - Promote highest level of mobility daily  - Provide frequent reorientation  - Promote wakefulness i.e. lights on, blinds o period  Description  INTERVENTIONS  - Monitor WBC  - Administer growth factors as ordered  - Implement neutropenic guidelines  Outcome: Not Progressing     Problem: SAFETY ADULT - FALL  Goal: Free from fall injury  Description  INTERVENTIONS:  - Assess pt NORMAL LIMITS. PATIENT HAS ORDER FOR SCD, PATIENT REFUSED SCD AND REMOVES THEM. PATIENT IS LOVENOX FOR DVT PROPHYLAXIS. PATIENT HAS EVANS IN PLACE. PATIENT HAD REMOVED EVANS WITH 10ML BALLOON INTACT. PATIENT HAD NEW EVANS REINSERTED.  NO TRAUMA NOTED FROM D

## 2020-05-09 NOTE — CM/SW NOTE
SW received notification from PT/OT recommendations are for acute rehab. SW placed call to son Katie Mcdowell who confirms that patient resides at [facility]; however, plan prior to [facility] admission was to transition to [facility] for [address].      Radha Adamson

## 2020-05-09 NOTE — OCCUPATIONAL THERAPY NOTE
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 Multiple cues required to sequence feeding activity. Education Provided: Educated patient in role of OT and POC    General Observations: Patient with significant expressive aphasia.  She also appears to have some level of History  Past Surgical History:   Procedure Laterality Date   • APPENDECTOMY     • BACK SURGERY      L3-S2 at [facility]   • COLONOSCOPY     • HEMORRHOIDECTOMY     • HYSTERECTOMY  1970   • LAMINECTOMY  8/7/14    L2-S1 decompressive laminectomy with bilat for toilet, bedpan or urinal? : A Lot  -   Putting on and taking off regular upper body clothing?: A Lot  -   Taking care of personal grooming such as brushing teeth?: A Lot  -   Eating meals?: A Lot    AM-PAC Score:  Score: 12  Approx Degree of Impairment: 66

## 2020-05-09 NOTE — PROGRESS NOTES
[facility]    Progress Note    Ana Paula Cutting Patient Status:  Inpatient    1934 MRN [id number]   Location [facility] 4W/SW/SE Attending Gagan Keating, DO   Hosp Day # 4 PCP Martha Yancey DO       SUBJECTIVE:  Was a b findings suggesting trace petechial type hemorrhage in these regions. There is also subtle asymmetric T2 hyperintense  signal abnormality involving the left cerebellar sulci.   Imaging findings raise suspicion for infectious/inflammatory encephalitis with involving the left posterior temporal and left occipital lobes with findings suggesting trace petechial type hemorrhage in these regions. Moustapha Kelch is also subtle asymmetric T2 hyperintense  signal abnormality involving the left cerebellar sulci.  Imaging fin po BID; currently on ASA 81 mg po qD and Plavix 75 mg po qD-->held for LP     Renal mass  renal US in Jan 2018 showing anechoic cyst measuring 1.8 x 1.5 x 1.5cm cyst at the upper pole of the R kidney and 1.1x0.9x1.1cm lesion with intermediate echogenicity

## 2020-05-09 NOTE — PHYSICAL THERAPY NOTE
PHYSICAL THERAPY EVALUATION - INPATIENT     Room Number: 417/417-A  Evaluation Date: 5/9/2020  Type of Evaluation: Initial   Physician Order: PT Eval and Treat    Presenting Problem: AMS, found on floor at [facility]  Reason for Therapy: Mobility Dysfunction and has R/L discrimination issues and decreased awareness of LLE position. She is up in chair at end of session with all needs in reach and coffee in front, PCT in room. She will benefit from ongoing skilled therapy.      Patient will benefit from continued IP SURGERY PROCEDURE UNLISTED     • TONSILLECTOMY         HOME SITUATION  Type of Home: Assisted living facility(at [facility])   Home Layout: One level   Patient Owned Equipment: (unknown)  Patient Regularly Uses: Reading glasses    Prior Level of Indepe wheelchair)?: A Lot   -   Need to walk in hospital room?: A Lot   -   Climbing 3-5 steps with a railing?: Total     AM-PAC Score:  Raw Score: 12   Approx Degree of Impairment: 68.66%   Standardized Score (AM-PAC Scale): 35.33   CMS Modifier (G-Code): CL

## 2020-05-10 PROCEDURE — 99232 SBSQ HOSP IP/OBS MODERATE 35: CPT | Performed by: OTHER

## 2020-05-10 PROCEDURE — 99232 SBSQ HOSP IP/OBS MODERATE 35: CPT | Performed by: INTERNAL MEDICINE

## 2020-05-10 RX ORDER — CLONAZEPAM 0.5 MG/1
0.25 TABLET ORAL 2 TIMES DAILY
Status: DISCONTINUED | OUTPATIENT
Start: 2020-05-10 | End: 2020-05-18

## 2020-05-10 NOTE — PROGRESS NOTES
Neurology Inpatient Follow-up Note      HPI:     Patient being seen in follow up. Interval notes and workup reviewed. Patient remains extremely confused and aphasic.       Past Medical Hisotory:  Reviewed    Medications:  Reviewed    Allergies:  No Know contrast; will also plan for MRA    –Delirium precautions:  Minimize sedating medications when possible, increase activity / up in chair during day, minimize interruptions at night, re-orienting cues (e.g. clocks, calendars), familiar staff when possible

## 2020-05-10 NOTE — PROGRESS NOTES
[facility]    Progress Note    Li Spring Patient Status:  Inpatient    1934 MRN [id number]   Location [facility] 4W/SW/SE Attending Charley Brittle, DO   Hosp Day # 5 PCP Jessica Justin DO       SUBJECTIVE:  Is doin Urinary tract infection without hematuria, site unspecified  s/p rocephin IV; Ucx: enterobacter cloacae      Altered mental status, unspecified altered mental status type  Unclear etiology as this is a change from her baseline.  Initial head CT negative to claudication, RLE angiogram performed in Sept 2019 showed long  of the distal SFA, popliteal and TP trunk.  There is no recanalization other than peroneal artery which gives collaterals to anterior tibial and posterior tibial; attempted opening of CT

## 2020-05-10 NOTE — PLAN OF CARE
Plan for lumbar puncture on Monday.   Problem: Delirium  Goal: Minimize duration of delirium  Description  Interventions:  - Encourage use of hearing aids, eye glasses  - Promote highest level of mobility daily  - Provide frequent reorientation  - Promote w anticipated neutropenic period  Description  INTERVENTIONS  - Monitor WBC  - Administer growth factors as ordered  - Implement neutropenic guidelines  Outcome: Progressing     Problem: SAFETY ADULT - FALL  Goal: Free from fall injury  Description  INTERVEN

## 2020-05-11 ENCOUNTER — APPOINTMENT (OUTPATIENT)
Dept: GENERAL RADIOLOGY | Facility: HOSPITAL | Age: 85
DRG: 098 | End: 2020-05-11
Attending: Other
Payer: MEDICARE

## 2020-05-11 PROCEDURE — 99232 SBSQ HOSP IP/OBS MODERATE 35: CPT | Performed by: INTERNAL MEDICINE

## 2020-05-11 PROCEDURE — B01B1ZZ FLUOROSCOPY OF SPINAL CORD USING LOW OSMOLAR CONTRAST: ICD-10-PCS | Performed by: RADIOLOGY

## 2020-05-11 PROCEDURE — 009U3ZX DRAINAGE OF SPINAL CANAL, PERCUTANEOUS APPROACH, DIAGNOSTIC: ICD-10-PCS | Performed by: RADIOLOGY

## 2020-05-11 PROCEDURE — 62328 DX LMBR SPI PNXR W/FLUOR/CT: CPT | Performed by: OTHER

## 2020-05-11 PROCEDURE — 99232 SBSQ HOSP IP/OBS MODERATE 35: CPT | Performed by: OTHER

## 2020-05-11 RX ORDER — SODIUM CHLORIDE 9 MG/ML
INJECTION, SOLUTION INTRAVENOUS CONTINUOUS
Status: DISCONTINUED | OUTPATIENT
Start: 2020-05-11 | End: 2020-05-12

## 2020-05-11 RX ORDER — CLOPIDOGREL BISULFATE 75 MG/1
75 TABLET ORAL DAILY
Status: DISCONTINUED | OUTPATIENT
Start: 2020-05-12 | End: 2020-05-20

## 2020-05-11 RX ORDER — ESCITALOPRAM OXALATE 10 MG/1
5 TABLET ORAL DAILY
Status: DISCONTINUED | OUTPATIENT
Start: 2020-05-11 | End: 2020-05-20

## 2020-05-11 RX ORDER — ENOXAPARIN SODIUM 100 MG/ML
30 INJECTION SUBCUTANEOUS DAILY
Status: DISCONTINUED | OUTPATIENT
Start: 2020-05-12 | End: 2020-05-19

## 2020-05-11 RX ORDER — LORAZEPAM 2 MG/ML
1 INJECTION INTRAMUSCULAR ONCE
Status: COMPLETED | OUTPATIENT
Start: 2020-05-11 | End: 2020-05-11

## 2020-05-11 NOTE — PROGRESS NOTES
05/11/20 1626   Clinical Encounter Type   Visited With Patient   Routine Visit Introduction   Continue Visiting Yes   Referral From Nurse   Referral To    Patient Spiritual Encounters   Spiritual Needs Pt has trouble speaking, aphasia.  Very plea

## 2020-05-11 NOTE — PLAN OF CARE
Problem: Patient Centered Care  Goal: Patient preferences are identified and integrated in the patient's plan of care  Description  Interventions:  - What would you like us to know as we care for you? Patient lives in [facility].   - Provide timely, c off, minimize noise and interruptions  - Encourage family to assist in orientation and promotion of home routines  Outcome: Not Progressing     Problem: SAFETY ADULT - FALL  Goal: Free from fall injury  Description  INTERVENTIONS:  - Assess pt frequently f with therapy. Was up in chair for 6 hours. Safety measures applied and reviewed, Non skid socks in use. bed and chair exit alarm is in use,call light within reach. Bed is in lowest position. Had spinal tap done today.  D/c plan for rehab when stable and candice

## 2020-05-11 NOTE — PROGRESS NOTES
[place] [facility]    Abel Chan Patient Status:  Inpatient    1934 MRN [id number]   Location [facility] 4W/SW/SE Attending Namrata Paredes,    Hosp Day # 6 PCP Heather Tejeda DO     Subjective:  RO Dyslipidemia     Physical deconditioning     Clostridium difficile infection     Urinary tract infection without hematuria, site unspecified     Altered mental status, unspecified altered mental status type     UTI (urinary tract infection)      ASSESSMENT

## 2020-05-11 NOTE — CM/SW NOTE
SAL received message from Maddison at [facility] who states a PMR was not consulted and pt would need a PMR prior to acceptance.      SAL asked RN Felicita Up to place a PMR consult     PLAN: Pending PMR and placement -  Acute rehab vs Greyson HUANG/JACK to remain available

## 2020-05-11 NOTE — CM/SW NOTE
Patient comes from [facility] assisted living and has been tested for COVID-19. This patient's COVID-19 test is negative. At the time of this note, there are known positive cases in that facility.

## 2020-05-11 NOTE — PROGRESS NOTES
[facility] Note:  Renal Dose Adjustment for Enoxaparin (LOVENOX)    Alonzo Cummins has been prescribed Enoxaparin (LOVENOX) 40 mg subcutaneously every 24 hours.     Estimated Creatinine Clearance: 25.2 mL/min (A) (based on SCr of 1.29 mg/dL (H)

## 2020-05-11 NOTE — PHYSICAL THERAPY NOTE
PHYSICAL THERAPY TREATMENT NOTE - INPATIENT     Room Number: 417/417-A       Presenting Problem: AMS, found on floor at [facility]    Problem List  Principal Problem:    Urinary tract infection without hematuria, site unspecified  Active Problems:    C. difficile Static Sitting: Poor +  Dynamic Sitting: Poor           Static Standing: Poor -  Dynamic Standing: Poor -    ACTIVITY TOLERANCE                         O2 WALK                  AM-PAC '6-Clicks' INPATIENT SHORT FORM - BASIC MOBILITY  How much Current Status Max A while holding onto the therapist   Goal #5 Patient to demonstrate independence with home activity/exercise instructions provided to patient in preparation for discharge.    Goal #5   Current Status IN PROGRESS   Goal #6    Goal #6  Cu

## 2020-05-11 NOTE — PROGRESS NOTES
Neurology Inpatient Follow-up Note      HPI:     Patient being seen in follow up. Interval notes and workup reviewed. Patient remains confused and aphasic, although slightly improved.     For our mutual protection, during entire interaction, mask, glove cytology    –We will plan for short-term interval repeat MRI brain with and without contrast; will also plan for MRA    –Delirium precautions:  Minimize sedating medications when possible, increase activity / up in chair during day, minimize interruptions

## 2020-05-11 NOTE — PROGRESS NOTES
[facility]    Progress Note    Kirke Memory Patient Status:  Inpatient    1934 MRN [id number]   Location [facility] 4W/SW/SE Attending Jack Blair DO   Hosp Day # 6 PCP Wendie Harrell DO       SUBJECTIVE:  Pt doin 1. Successful fluoroscopic guided lumbar puncture. CSF specimens were sent for laboratory analysis.     Dictated by (CST): Ronna Vo MD on 5/11/2020 at 3:29 PM     Finalized by (CST): Ronna Vo MD on 5/11/2020 at 3:32 PM                  Assessme aspirin 81mg daily, zetia daily     Peripheral vascular disease  the patient states she started Pletal in 2014; follows vascular surgeon Dr Adalberto Fernandez at [facility] 6/2017 shows moderate stenosis in distal R common femoral artery, complete occlusion of both poplite medication at this time.      Senile dementia  seroquel      DVT prophylaxis  Resume lovenox and plavix in AM  Disposition: would plan for d/c acute rehab ([facility], son is in agreement)     Code status  Pt is DNR        Jaclyn Guerra, DO  5/11/20

## 2020-05-11 NOTE — CONSULTS
Consult requested on this 80year old admitted for altered mental status, and found down by staf. diagnosed with infectious encephalitis, XA guided LP to be done today; CSF cell count, glucose, proptein, culture, viral stufies, TB studies.  Etiology high fo HCT 45.5 35.0 - 48.0 %    MCV 94.4 80.0 - 100.0 fL    MCH 30.5 26.0 - 34.0 pg    MCHC 32.3 31.0 - 37.0 g/dL    RDW-SD 47.2 (H) 35.1 - 46.3 fL    RDW 13.4 11.0 - 15.0 %    .0 (L) 150.0 - 450.0 10(3)uL    Neutrophil Absolute Prelim 3.40 1.50 - 7.70 x1

## 2020-05-12 PROBLEM — N28.9 ACUTE RENAL INSUFFICIENCY: Status: ACTIVE | Noted: 2020-05-12

## 2020-05-12 PROCEDURE — 99232 SBSQ HOSP IP/OBS MODERATE 35: CPT | Performed by: INTERNAL MEDICINE

## 2020-05-12 PROCEDURE — 99232 SBSQ HOSP IP/OBS MODERATE 35: CPT | Performed by: OTHER

## 2020-05-12 NOTE — PLAN OF CARE
Problem: Patient Centered Care  Goal: Patient preferences are identified and integrated in the patient's plan of care  Description  Interventions:  - What would you like us to know as we care for you? Patient lives in [facility].   - Provide timely, c limitations  - Instruct pt to call for assistance with activity based on assessment  - Modify environment to reduce risk of injury  - Provide assistive devices as appropriate  - Consider OT/PT consult to assist with strengthening/mobility  - Encourage toil chair for 6 hours. Safety measures applied and reviewed, Non skid socks in use. bed and chair exit alarm is in use,call light within reach. Bed is in lowest position. D/c plan for rehab when stable and cleared.

## 2020-05-12 NOTE — PROGRESS NOTES
[place] [facility]    Heraclio Walls Patient Status:  Inpatient    1934 MRN [id number]   Location [facility] 4W/SW/SE Attending Anna Brady DO   Hosp Day # 7 PCP Jennie Malcolm DO     Subjective:  RO tract infection without hematuria     Acute metabolic encephalopathy     C. difficile diarrhea     Essential hypertension     Dyslipidemia     Physical deconditioning     Clostridium difficile infection     Urinary tract infection without hematuria, site u

## 2020-05-12 NOTE — DIETARY NOTE
ADULT NUTRITION INITIAL ASSESSMENT    Pt is at moderate nutrition risk. Pt does not meet malnutrition criteria.       RECOMMENDATIONS TO MD:  See Nutrition Intervention     NUTRITION DIAGNOSIS/PROBLEM:  Inadequate oral intake related to decreased ability Incontinence, Lumbar spinal stenosis, Lupus ([address] Macular degeneration, Nodule of kidney, Osteoporosis, Peripheral vascular disease ([address] Renal disorder, Subclavian arterial stenosis ([address] and Visual impairment. ANTHROPOMETRICS:  HT:  5'2\"  WT: 81. BUN 9 13   CREATSERUM 1.29* 1.03*   CA 9.4 8.6    144   K 3.7 3.6    114*   CO2 25.0 26.0   OSMOCALC 296* 298*       NUTRITION RELATED PHYSICAL FINDINGS:  - Body Fat/Muscle Mass: well nourished per quick visual exam--patient with C. difficile

## 2020-05-12 NOTE — CM/SW NOTE
SW received MDO for discharge planning/plan for acute rehab. SW reviewed PMR completed and recommendation is for acute rehab at discharge from [address] [place] [address].      From prior conversation with son on 5/9 - son requested referral to be sent to Justyna Ball

## 2020-05-12 NOTE — PROGRESS NOTES
[facility]    Progress Note    Heraclio Walls Patient Status:  Inpatient    1934 MRN [id number]   Location [facility] 4W/SW/SE Attending Anna Brady DO   Hosp Day # 7 PCP Jennie Malcolm DO     Subjective:   Melissa Lau Acute renal insufficiency    Altered mental status appears about the same. ? Cause. Initial CT head negative. MRI brain showed suspicious findings for infectious/inflammatory encephalitis. LP performed yesterday--cell count with 2 WBCs, 5 RBCs.   Cytol

## 2020-05-12 NOTE — PROGRESS NOTES
Neurology Inpatient Follow-up Note      HPI:     Patient being seen in follow up. Interval notes and workup reviewed. A little brighter, less aphasic today.       Past Medical Hisotory:  Reviewed    Medications:  Reviewed    Allergies:  No Known Allergi medications when possible, increase activity / up in chair during day, minimize interruptions at night, re-orienting cues (e.g. clocks, calendars), familiar staff when possible      Neurology service will continue to follow.   Please page with any questions

## 2020-05-13 ENCOUNTER — APPOINTMENT (OUTPATIENT)
Dept: PICC SERVICES | Facility: HOSPITAL | Age: 85
DRG: 098 | End: 2020-05-13
Attending: PHYSICIAN ASSISTANT
Payer: MEDICARE

## 2020-05-13 PROCEDURE — 99232 SBSQ HOSP IP/OBS MODERATE 35: CPT | Performed by: INTERNAL MEDICINE

## 2020-05-13 PROCEDURE — 05HB33Z INSERTION OF INFUSION DEVICE INTO RIGHT BASILIC VEIN, PERCUTANEOUS APPROACH: ICD-10-PCS | Performed by: INTERNAL MEDICINE

## 2020-05-13 PROCEDURE — 99232 SBSQ HOSP IP/OBS MODERATE 35: CPT | Performed by: OTHER

## 2020-05-13 NOTE — PROGRESS NOTES
[facility]    Progress Note    Gregory Fletcher Patient Status:  Inpatient    1934 MRN [id number]   Location [facility] 4W/SW/SE Attending Thomas Jaime, DO   Hosp Day # 8 PCP Rachelle Pelayo,        SUBJECTIVE:  Feels o 0.25 mg, Oral, BID  Pantoprazole Sodium (PROTONIX) EC tab 40 mg, 40 mg, Oral, QAM AC  amLODIPine Besylate (NORVASC) tab 5 mg, 5 mg, Oral, Daily  QUEtiapine Fumarate (SEROQUEL) tab 25 mg, 25 mg, Oral, Nightly  vancomycin HCl (FIRVANQ) 50 MG/ML oral solution post T8 vertebroplasty in 2005.     Carotid artery stenosis  carotid Doppler 12/2015 showing mild bilateral carotid disease; on aspirin 81mg daily, zetia daily     Peripheral vascular disease  the patient states she started Pletal in 2014; follows vascular evaluated by neurology as outpatient. MRI brain shows chronic microvascular disease. discussed memory games and puzzles.  Declines medication at this time.      Senile dementia  seroquel      DVT prophylaxis  Resume lovenox and plavix  Disposition: would pl

## 2020-05-13 NOTE — PROGRESS NOTES
Neurology Inpatient Follow-up Note      HPI:     Patient being seen in follow up. Interval notes and workup reviewed. Patient is improving little by little.       Past Medical Hisotory:  Reviewed    Medications:  Reviewed    Allergies:  No Known Allergi Levi Adhikari, [address] [place] [address]  [phone]      For our mutual protection, during entire interaction, mask, gloves, eye goggles were worn by myself. Very stringent hand hygiene practiced before, during, after visit.

## 2020-05-13 NOTE — OCCUPATIONAL THERAPY NOTE
Pt not seen for OT at this time secondary to pt receiving PICC line bedside. Will follow up  With pt next date as schedule permits.

## 2020-05-13 NOTE — PROGRESS NOTES
[place] [facility]    Camila Arthur Patient Status:  Inpatient    1934 MRN [id number]   Location [facility] 4W/SW/SE Attending Reva Encarnacion DO   Hosp Day # 8 PCP Emmett Friedman DO     Subjective:  RO hypertension     Dyslipidemia     Physical deconditioning     Clostridium difficile infection     Urinary tract infection without hematuria, site unspecified     Altered mental status, unspecified altered mental status type     UTI (urinary tract infection

## 2020-05-13 NOTE — PHYSICAL THERAPY NOTE
PT not available at this time undergoing PICC line placement at bed side. PT will resume as pt availability and scheduling allows. Pt is on track for Acute rehab as medical progress allows with ongoing PT / OT.

## 2020-05-13 NOTE — WOUND PROGRESS NOTE
Wound Care Services  Follow up on the pt's skin, she is alert and oriented to person and place, she has some garbled words, but noticed improvement and she is speaking in sentences. This am she knows her family from the pictures at the bedside.  Left arm sk

## 2020-05-13 NOTE — PLAN OF CARE
Problem: Delirium  Goal: Minimize duration of delirium  Description  Interventions:  - Encourage use of hearing aids, eye glasses  - Promote highest level of mobility daily  - Provide frequent reorientation  - Promote wakefulness i.e. lights on, blinds o period  Description  INTERVENTIONS  - Monitor WBC  - Administer growth factors as ordered  - Implement neutropenic guidelines  Outcome: Progressing     Problem: SAFETY ADULT - FALL  Goal: Free from fall injury  Description  INTERVENTIONS:  - Assess pt freq maintained. On contact precautions for C. Diff. Midline to RUE inserted today for long term antibiotics. Prompt incontinence care provided as needed. Having loose BMs. Bed alarm on. Up to the chair x2 assist SP. Chair alarm on.  Blue boots in place, mepilex

## 2020-05-13 NOTE — CM/SW NOTE
5/13/2020  SAL was notified MD signed POLST form on patient chart, no other signatures/sections completed.      SAL placed call to son/Tariq who confirms DNR status, SW went over section B and C for patient - son would like to review this form and fill it ou

## 2020-05-14 ENCOUNTER — APPOINTMENT (OUTPATIENT)
Dept: MRI IMAGING | Facility: HOSPITAL | Age: 85
DRG: 098 | End: 2020-05-14
Attending: Other
Payer: MEDICARE

## 2020-05-14 PROCEDURE — 70553 MRI BRAIN STEM W/O & W/DYE: CPT | Performed by: OTHER

## 2020-05-14 PROCEDURE — 99232 SBSQ HOSP IP/OBS MODERATE 35: CPT | Performed by: OTHER

## 2020-05-14 PROCEDURE — 99232 SBSQ HOSP IP/OBS MODERATE 35: CPT | Performed by: INTERNAL MEDICINE

## 2020-05-14 NOTE — PROGRESS NOTES
[place] [facility] - [facility]    Progress Note    [facility] Patient Status:  Inpatient    1934 MRN [id number]   Location [facility] 4W/SW/SE Attending Teddy Nicholson DO   Hosp Day # 9 PCP Bri Herr DO       SUBJECTIVE:  Feels w by (CST): Solomon Ramirez MD on 5/11/2020 at 3:29 PM     Finalized by (CST): Solomon Ramirez MD on 5/11/2020 at 3:32 PM                  Assessment and Plan:     Urinary tract infection without hematuria, site unspecified  Treated; s/p rocephin IV; Ucx: ente NW; CTA 6/2017 shows moderate stenosis in distal R common femoral artery, complete occlusion of both popliteal arteries. ; underwent stenting and angioplasty with Dr. Agnes King; due to claudication, RLE angiogram performed in Sept 2019 showed long  preferable, son is in agreement)     Code status  Pt is DNR          Thermon Citlalli DO  5/14/2020  12:10 PM

## 2020-05-14 NOTE — OCCUPATIONAL THERAPY NOTE
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 : A Lot  -   Putting on and taking off regular upper

## 2020-05-14 NOTE — PLAN OF CARE
Problem: Delirium  Goal: Minimize duration of delirium  Description  Interventions:  - Encourage use of hearing aids, eye glasses  - Promote highest level of mobility daily  - Provide frequent reorientation  - Promote wakefulness i.e. lights on, blinds o period  Description  INTERVENTIONS  - Monitor WBC  - Administer growth factors as ordered  - Implement neutropenic guidelines  Outcome: Not Progressing     Problem: SAFETY ADULT - FALL  Goal: Free from fall injury  Description  INTERVENTIONS:  - Assess pt AND HAS SCD IN PLACE FOR DVT PROPHYLAXIS. PATIENT IS INCONTINENT AND HAS EVANS IN PLACE. PATIENT HAS NOT HAD ANY COMPLAINTS OF PAIN. PATIENT IS UP WITH MAX ASSIST, TWO WITH WALKER AND STAND AND PIVOT TRANSFER.  PATIENT HAS MIDLINE IN PLACE ON RIGHT ARM AND

## 2020-05-14 NOTE — PROGRESS NOTES
Neurology Inpatient Follow-up Note      HPI:     Patient being seen in follow up. Interval notes and workup reviewed. Patient continues to improve little by little.       Past Medical Hisotory:  Reviewed    Medications:  Reviewed    Allergies:  No Known [place] [phone]      For our mutual protection, during entire interaction, mask, gloves, eye goggles were worn by myself. Very stringent hand hygiene practiced before, during, after visit.

## 2020-05-14 NOTE — PLAN OF CARE
Problem: Delirium  Goal: Minimize duration of delirium  Description  Interventions:  - Encourage use of hearing aids, eye glasses  - Promote highest level of mobility daily  - Provide frequent reorientation  - Promote wakefulness i.e. lights on, blinds o period  Description  INTERVENTIONS  - Monitor WBC  - Administer growth factors as ordered  - Implement neutropenic guidelines  Outcome: Progressing     Problem: SAFETY ADULT - FALL  Goal: Free from fall injury  Description  INTERVENTIONS:  - Assess pt freq sacrum. Incontinence care provided and barrier cream to bottom. Repeat MRI to be done this evening, the lumbar puncture results are negative. Plan for G[facility] at discharge.

## 2020-05-14 NOTE — PHYSICAL THERAPY NOTE
PHYSICAL THERAPY TREATMENT NOTE - INPATIENT     Room Number: 417/417-A       Presenting Problem: AMS, found on floor at [facility]    Problem List  Principal Problem:    Altered mental status, unspecified altered mental status type  Active Problems:    C. diffici to rate          BALANCE                                                                                                                     Static Sitting: Fair -  Dynamic Sitting: Poor +           Static Standing: Poor -  Dynamic Standing: Poor -    ACTI assistance   Goal #3   Current Status Min A 10 minutes   Goal #4 Patient will perform bed to/from chair transfers with RW with maximal assistance   Goal #4   Current Status Max A while holding onto the therapist   Goal #5 Patient to demonstrate independenc

## 2020-05-15 ENCOUNTER — TELEPHONE (OUTPATIENT)
Dept: INTERNAL MEDICINE CLINIC | Facility: CLINIC | Age: 85
End: 2020-05-15

## 2020-05-15 PROCEDURE — 99232 SBSQ HOSP IP/OBS MODERATE 35: CPT | Performed by: INTERNAL MEDICINE

## 2020-05-15 PROCEDURE — 99232 SBSQ HOSP IP/OBS MODERATE 35: CPT | Performed by: OTHER

## 2020-05-15 RX ORDER — ENOXAPARIN SODIUM 100 MG/ML
30 INJECTION SUBCUTANEOUS DAILY
Qty: 9 ML | Refills: 0 | Status: SHIPPED | OUTPATIENT
Start: 2020-05-16 | End: 2020-06-15

## 2020-05-15 RX ORDER — AMLODIPINE BESYLATE 5 MG/1
5 TABLET ORAL DAILY
Qty: 90 TABLET | Refills: 0 | Status: SHIPPED | OUTPATIENT
Start: 2020-05-16 | End: 2020-05-18

## 2020-05-15 RX ORDER — PANTOPRAZOLE SODIUM 40 MG/1
40 TABLET, DELAYED RELEASE ORAL
Qty: 90 TABLET | Refills: 0 | Status: SHIPPED | OUTPATIENT
Start: 2020-05-16 | End: 2020-07-15

## 2020-05-15 RX ORDER — ESCITALOPRAM OXALATE 5 MG/1
5 TABLET ORAL DAILY
Qty: 90 TABLET | Refills: 0 | Status: SHIPPED | OUTPATIENT
Start: 2020-05-16 | End: 2020-07-15

## 2020-05-15 RX ORDER — CLONAZEPAM 0.5 MG/1
0.25 TABLET ORAL 2 TIMES DAILY PRN
Qty: 60 TABLET | Refills: 0 | Status: SHIPPED | OUTPATIENT
Start: 2020-05-15 | End: 2020-05-18

## 2020-05-15 NOTE — PLAN OF CARE
Problem: Delirium  Goal: Minimize duration of delirium  Description  Interventions:  - Encourage use of hearing aids, eye glasses  - Promote highest level of mobility daily  - Provide frequent reorientation  - Promote wakefulness i.e. lights on, blinds o period  Description  INTERVENTIONS  - Monitor WBC  - Administer growth factors as ordered  - Implement neutropenic guidelines  Outcome: Progressing     Problem: SAFETY ADULT - FALL  Goal: Free from fall injury  Description  INTERVENTIONS:  - Assess pt freq bed alarm activated. Call light within reach. Will continue to monitor.

## 2020-05-15 NOTE — TELEPHONE ENCOUNTER
Pt son Kimberly Jones called, requesting results of spinal tap pt had taken on Monday, 5/11/20  Tasked to nursing

## 2020-05-15 NOTE — PHYSICAL THERAPY NOTE
PHYSICAL THERAPY TREATMENT NOTE - INPATIENT     Room Number: 417/417-A       Presenting Problem: AMS, found on floor at [facility]    Problem List  Principal Problem:    Altered mental status, unspecified altered mental status type  Active Problems:    C. diffici Bearing Restriction: None                PAIN ASSESSMENT   Rating: Unable to rate          BALANCE                                                                                                                     Static Sitting: Fair  Dynamic Sitting:  Fa rolling     Goal #2  Current Status Max A stood for about 5-7 minutes with the RW   Goal #3 Patient is able to sit EOB x 10 min with minimal assistance   Goal #3   Current Status Min A 10 minutes   Goal #4 Patient will perform bed to/from chair transfers w

## 2020-05-15 NOTE — PLAN OF CARE
Patient alert and oriented 1-2. Expressive aphasia. Plavix and heparin for dvt prevention. Marsh in place. C. Diff on oral vanco. Depends on. Up in chair. Lift back to bed. R arm midline. Plan for rehab when bed available.     Problem: Delirium  Goal: Minimi new pain  - Anticipate increased pain with activity and pre-medicate as appropriate  Outcome: Progressing     Problem: RISK FOR INFECTION - ADULT  Goal: Absence of fever/infection during anticipated neutropenic period  Description  INTERVENTIONS  - Monitor

## 2020-05-15 NOTE — PROGRESS NOTES
[facility]    Progress Note    Gregory Fletcher Patient Status:  Inpatient    1934 MRN [id number]   Location [facility] 4W/SW/SE Attending Thomas Jaime,    Hosp Day # 10 PCP Rachelle Pelayo DO       SUBJECTIVE:  Pt doi consistent with encephalitis.   Low-grade neoplasm is a less likely differential.    Dictated by (CST): Patrick Ramirez MD on 5/14/2020 at 9:17 PM     Finalized by (CST): Patrick Ramirez MD on 5/14/2020 at 9:30 PM                  Assessment 2005.     Carotid artery stenosis  carotid Doppler 12/2015 showing mild bilateral carotid disease; on aspirin 81mg daily, zetia daily     Peripheral vascular disease  the patient states she started Pletal in 2014; follows vascular surgeon Dr Izabel Gatica at [facility] outpatient. MRI brain shows chronic microvascular disease. discussed memory games and puzzles.  Declines medication at this time.      Senile dementia  seroquel      DVT prophylaxis  lovenox daily  Disposition: would plan for d/c acute rehab [facility] pref

## 2020-05-15 NOTE — TELEPHONE ENCOUNTER
**Routed erroneously to Dr. Elayne Frederick, please disregard. To Dr. Devonte Ding to please advise, thank you!

## 2020-05-15 NOTE — CM/SW NOTE
CM received MDO for DC to Acute Rehab STAT. CM f/u with Ellie/[facility] Liaison. Pt has been medically accepted however [facility] does not have beds till possibly Sat or Sun.   Per chart review of SAL Wolfe's notes pt has also been clinically accepted to [facility] a

## 2020-05-15 NOTE — PROGRESS NOTES
[place] [facility]    Courtney Winter Patient Status:  Inpatient    1934 MRN [id number]   Location [facility] 4W/SW/SE Attending Omid Morales DO   Hosp Day # 10 PCP Peter Crawford DO     Subjective:  R without hematuria, site unspecified     Altered mental status, unspecified altered mental status type     UTI (urinary tract infection)     Acute renal insufficiency      ASSESSMENT:    Antibiotics: PO vancomycin  Ceftriaxone  Antiviral: Acyclovir     85 y

## 2020-05-15 NOTE — PROGRESS NOTES
Neurology Inpatient Follow-up Note      HPI:     Patient being seen in follow up. Interval notes and workup reviewed. Little by little, improving.       Past Medical Hisotory:  Reviewed    Medications:  Reviewed    Allergies:  No Known Allergies      RO

## 2020-05-16 PROCEDURE — 99232 SBSQ HOSP IP/OBS MODERATE 35: CPT | Performed by: INTERNAL MEDICINE

## 2020-05-16 NOTE — PHYSICAL THERAPY NOTE
PHYSICAL THERAPY TREATMENT NOTE - INPATIENT     Room Number: 417/417-A       Presenting Problem: AMS, found on floor at [facility]    Problem List  Principal Problem:    Altered mental status, unspecified altered mental status type  Active Problems:    C. diffici session.      OBJECTIVE  Precautions: Limb alert - left    WEIGHT BEARING RESTRICTION  Weight Bearing Restriction: None                PAIN ASSESSMENT   Rating: Unable to rate          BALANCE level: moderate assistance with walker - rolling     Goal #2  Current Status Max A x2 with the RW   Goal #3 Patient is able to sit EOB x 10 min with minimal assistance   Goal #3   Current Status Min A 10 minutes   Goal #4 Patient will perform bed to/from c

## 2020-05-16 NOTE — CM/SW NOTE
Per Kingsley Gonzales RN, patient stable for discharge. Patient accepted at [address] [place] [address]. Spoke with Arlester Libman w/ weekend admissions, no beds available for this weekend. Arlester Libman will contact  if bed becomes available.     Annabella Ambriz, [address]

## 2020-05-16 NOTE — OCCUPATIONAL THERAPY NOTE
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)    Problem List  Principal Problem:    Altered mental status, unspecified altered mental status type  Active session     OBJECTIVE  Precautions: Limb alert - left    WEIGHT BEARING RESTRICTION  Weight Bearing Restriction: None                PAIN ASSESSMENT  Rating: Unable to rate  Location: (generalized)  Management Techniques:  Activity promotion     ACTIVITY TO , RW for support, hands on cues to advance L foot with bed to chair transfers    Patient will complete OFH with less than 3 verbal cues and CGA  Comment: min asisst to comb hair and wash face, cues to complete task    Patient will complete LE dressing with

## 2020-05-16 NOTE — PLAN OF CARE
Problem: Delirium  Goal: Minimize duration of delirium  Description  Interventions:  - Encourage use of hearing aids, eye glasses  - Promote highest level of mobility daily  - Provide frequent reorientation  - Promote wakefulness i.e. lights on, blinds o period  Description  INTERVENTIONS  - Monitor WBC  - Administer growth factors as ordered  - Implement neutropenic guidelines  Outcome: Not Progressing     Problem: SAFETY ADULT - FALL  Goal: Free from fall injury  Description  INTERVENTIONS:  - Assess pt AND HAS SCD IN PLACE FOR DVT PROPHYLAXIS. PATIENT IS INCONTINENT WITH INCONTINENCE CARE PROVIDED. PATIENT NOTED WITH ON SOFT BM DURING SHIFT, AND HAS EVANS IN PLACE. PATIENT HAS NOT HAD ANY COMPLAINTS OF PAIN.  PATIENT IS UP WITH MAX ASSIST, TWO WITH WALKER

## 2020-05-16 NOTE — PROGRESS NOTES
[facility]    Progress Note    Jasiel Cage Patient Status:  Inpatient    1934 MRN [id number]   Location [facility] 4W/SW/SE Attending Heydi Louise DO   Hosp Day # 6 PCP Sue Lara DO       SUBJECTIVE:  Pt madeline (NORVASC) tab 5 mg, 5 mg, Oral, Daily  QUEtiapine Fumarate (SEROQUEL) tab 25 mg, 25 mg, Oral, Nightly  vancomycin HCl (FIRVANQ) 50 MG/ML oral solution 125 mg, 125 mg, Oral, Q6H  haloperidol lactate (HALDOL) 2 MG/ML solution 1 mg, 1 mg, Oral, Q6H PRN  Acycl mg po qD     Osteoporosis   DEXA -2.7 T-score in July 2019; no improvement on Fosamax, now receiving prolia.      Multiple vertebral compression fracture  Status post T8 vertebroplasty in 2005.     Carotid artery stenosis  carotid Doppler 12/2015 showing m Xarelto.     OA spine  Status post L2-S1 decompressive laminectomy with bilateral foraminotomies. Dr. Margie Villalobos.     Memory changes  She was evaluated by neurology as outpatient.  MRI brain shows chronic microvascular disease. discus

## 2020-05-17 PROCEDURE — 99232 SBSQ HOSP IP/OBS MODERATE 35: CPT | Performed by: INTERNAL MEDICINE

## 2020-05-17 NOTE — PLAN OF CARE
Problem: Delirium  Goal: Minimize duration of delirium  Description  Interventions:  - Encourage use of hearing aids, eye glasses  - Promote highest level of mobility daily  - Provide frequent reorientation  - Promote wakefulness i.e. lights on, blinds o period  Description  INTERVENTIONS  - Monitor WBC  - Administer growth factors as ordered  - Implement neutropenic guidelines  Outcome: Progressing     Problem: SAFETY ADULT - FALL  Goal: Free from fall injury  Description  INTERVENTIONS:  - Assess pt freq FOR DVT PROPHYLAXIS. PATIENT IS INCONTINENT WITH INCONTINENCE CARE PROVIDED. PATIENT NOTED WITH ON SOFT BM DURING SHIFT, AND HAS EVANS IN PLACE. PATIENT HAS NOT HAD ANY COMPLAINTS OF PAIN.  PATIENT IS UP WITH MAX ASSIST, TWO WITH WALKER AND STAND AND PIVOT

## 2020-05-17 NOTE — PROGRESS NOTES
[facility]    Progress Note    Camila Arthur Patient Status:  Inpatient    1934 MRN [id number]   Location [facility] 4W/SW/SE Attending Reva Encarnacion,    Hosp Day # 15 PCP Emmett Friedman DO       SUBJECTIVE:  Pt madeline ELLIPTA) 100-25 MCG/INH inhaler 1 puff, 1 puff, Inhalation, Daily          Assessment & Plan    Altered mental status, unspecified altered mental status type  Unclear etiology as this was a change from her baseline.  Initial head CT negative for acute findi she started Pletal in 2014; follows vascular surgeon Dr Izabel Gatica at [facility] 6/2017 shows moderate stenosis in distal R common femoral artery, complete occlusion of both popliteal arteries. ; underwent stenting and angioplasty with Dr. Annette Barnard; due to cl daily  Disposition: would plan for d/c acute rehab (david stanton, son is in agreement) -- await bed at [facility]     Code status  Pt is DNR         Antonio Salmeron MD  5/17/2020  12:37 PM

## 2020-05-17 NOTE — PLAN OF CARE
Best John is from assisted living [address] assisted living. Alert to person, place, event, but not time-stated it was June. Safety intact. Denies pain. Uti-iv acyclovir-afebrile, oob with 2 assists/gait belt/rolling walker.  Plan jensen wheeler upon d/c, sw to side effects  - Notify MD/LIP if interventions unsuccessful or patient reports new pain  - Anticipate increased pain with activity and pre-medicate as appropriate  Outcome: Progressing     Problem: RISK FOR INFECTION - ADULT  Goal: Absence of fever/infecti system  Outcome: Progressing

## 2020-05-17 NOTE — PHYSICAL THERAPY NOTE
PHYSICAL THERAPY TREATMENT NOTE - INPATIENT     Room Number: 417/417-A       Presenting Problem: AMS, found on floor at [facility]    Problem List  Principal Problem:    Altered mental status, unspecified altered mental status type  Active Problems:    C. diffici BEARING RESTRICTION  Weight Bearing Restriction: None                PAIN ASSESSMENT   Rating: Unable to rate     Management Techniques: Activity promotion; Body mechanics; Relaxation;Repositioning    BALANCE transfers Sit to/from Stand at assistance level: moderate assistance with walker - rolling     Goal #2  Current Status Max A x2 while holding onto the therapist   Goal #3 Patient is able to sit EOB x 10 min with minimal assistance   Goal #3   Current Statu

## 2020-05-18 PROCEDURE — 99232 SBSQ HOSP IP/OBS MODERATE 35: CPT | Performed by: INTERNAL MEDICINE

## 2020-05-18 RX ORDER — CLONAZEPAM 0.5 MG/1
0.25 TABLET ORAL DAILY
Status: DISCONTINUED | OUTPATIENT
Start: 2020-05-19 | End: 2020-05-19

## 2020-05-18 NOTE — DIETARY NOTE
ADULT NUTRITION REASSESSMENT     Pt is at moderate nutrition risk. Pt does not meet malnutrition criteria.       RECOMMENDATIONS TO MD:  See Nutrition Intervention     NUTRITION DIAGNOSIS/PROBLEM:  Inadequate oral intake related to decreased ability to c COPD (chronic obstructive pulmonary disease) ([facility]deep [facility] (3/1/14), High cholesterol, History of blood transfusion, Hyperlipidemia, Incontinence, Lumbar spinal stenosis, Lupus ([facility] Macular degeneration, Nodule of kidney, Oste mg/kg (Adjusted) Intravenous Q12H   • Fluticasone Furoate-Vilanterol  1 puff Inhalation Daily       LABS: reviewed-no recent    NUTRITION RELATED PHYSICAL FINDINGS:  - Body Fat/Muscle Mass: well nourished overall.  Mild orbital deficits, otherwise well nour

## 2020-05-18 NOTE — CM/SW NOTE
JACK made aware by [facility]/ [facility] that pt is still on the list for admission. Still no beds available for today. [facility] is at 100% capacity. Hopeful for bed tomorrow or Wed. Dr. Umesh Dupree aware of no bed availability and does not want pt to go elsewhere.      Will need

## 2020-05-18 NOTE — PROGRESS NOTES
[facility]    Progress Note    Heraclio Walls Patient Status:  Inpatient    1934 MRN [id number]   Location [facility] 4W/SW/SE Attending Anna Brady DO   Hosp Day # 15 PCP Jennie Malcolm DO       SUBJECTIVE:  Pt doi is also subtle asymmetric T2 hyperintense  signal abnormality involving the left cerebellar sulci.  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 s/p LP; was empirically starte tibial; attempted opening of  of the distal right SFA, popliteal and tibioperoneal trunk was unsuccessful per Dr Boucher Orf; off Pletal 100 mg po BID; currently on ASA 81 mg po qD and Plavix 75 mg po qD-->held for LP     Renal mass  renal US in Jan 2018 scooby

## 2020-05-18 NOTE — PLAN OF CARE
Problem: Delirium  Goal: Minimize duration of delirium  Description  Interventions:  - Encourage use of hearing aids, eye glasses  - Promote highest level of mobility daily  - Provide frequent reorientation  - Promote wakefulness i.e. lights on, blinds o period  Description  INTERVENTIONS  - Monitor WBC  - Administer growth factors as ordered  - Implement neutropenic guidelines  Outcome: Progressing     Problem: SAFETY ADULT - FALL  Goal: Free from fall injury  Description  INTERVENTIONS:  - Assess pt freq HAS SCD IN PLACE FOR DVT PROPHYLAXIS. PATIENT HAS BLUE BOOTS IN PLACE. PATIENT HAS EVANS INPLACE FOR URINARY RETENTION. PATIENT HAS NOT HAD ANY COMPLAINTS OF PAIN. PATIENT IS UP WITH MAX ASSIST, TWO WITH WALKER AND STAND AND PIVOT TRANSFER/ROLLING CHAIR.  P

## 2020-05-19 PROCEDURE — 99232 SBSQ HOSP IP/OBS MODERATE 35: CPT | Performed by: INTERNAL MEDICINE

## 2020-05-19 RX ORDER — ASPIRIN 81 MG/1
81 TABLET ORAL DAILY
Status: DISCONTINUED | OUTPATIENT
Start: 2020-05-19 | End: 2020-05-20

## 2020-05-19 RX ORDER — ENOXAPARIN SODIUM 100 MG/ML
40 INJECTION SUBCUTANEOUS DAILY
Status: DISCONTINUED | OUTPATIENT
Start: 2020-05-20 | End: 2020-05-20

## 2020-05-19 NOTE — PLAN OF CARE
Problem: Delirium  Goal: Minimize duration of delirium  Description  Interventions:  - Encourage use of hearing aids, eye glasses  - Promote highest level of mobility daily  - Provide frequent reorientation  - Promote wakefulness i.e. lights on, blinds o period  Description  INTERVENTIONS  - Monitor WBC  - Administer growth factors as ordered  - Implement neutropenic guidelines  Outcome: Progressing     Problem: SAFETY ADULT - FALL  Goal: Free from fall injury  Description  INTERVENTIONS:  - Assess pt freq socks in use. bed and chair exit alarm is in use,call light within reach. Bed is in lowest position. Enteric contact isolation maintained, has x3 BM.

## 2020-05-19 NOTE — PROGRESS NOTES
[facility]    Progress Note    Garret Mike Patient Status:  Inpatient    1934 MRN [id number]   Location [facility] 4W/SW/SE Attending Juna Jose Heredia DO   Hosp Day # 15 PCP Turner Leigh DO       SUBJECTIVE:  Doing temporal and left occipital lobes with findings suggesting trace petechial type hemorrhage in these regions. Tatiana Lina is also subtle asymmetric T2 hyperintense  signal abnormality involving the left cerebellar sulci.  Imaging findings raise suspicion for inf  There is no recanalization other than peroneal artery which gives collaterals to anterior tibial and posterior tibial; attempted opening of  of the distal right SFA, popliteal and tibioperoneal trunk was unsuccessful per Dr Eliud Murrieta; off Pletal 100 mg po

## 2020-05-19 NOTE — OCCUPATIONAL THERAPY NOTE
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                Goals  on:   Frequency: 5x a week    Sabas De La O MA, OTR/L  Occupational Therapist

## 2020-05-19 NOTE — PROGRESS NOTES
[facility] Pharmacy Note:  Renal Dose Adjustment for Enoxaparin (LOVENOX)    Starlene Libman has been prescribed Enoxaparin (LOVENOX) 30 mg subcutaneously every 24 hours. Estimated Creatinine Clearance: 31.9 mL/min (based on SCr of 1.02 mg/dL).     He

## 2020-05-19 NOTE — CM/SW NOTE
JACK made aware by [facility], no beds available till possibly Thursday. JACK spoke with Dr. Pj Mason via secure chat, she is ok with pt going to another acute rehab.  JACK spoke with pt's son Jessica Cedeno via telephone call to provide updates and to discuss other Acute rehab

## 2020-05-19 NOTE — PHYSICAL THERAPY NOTE
PHYSICAL THERAPY TREATMENT NOTE - INPATIENT     Room Number: 417/417-A       Presenting Problem: AMS, found on floor at [facility]    Problem List  Principal Problem:    Altered mental status, unspecified altered mental status type  Active Problems:    C. diffici difficulty does the patient currently have. ..  -   Turning over in bed (including adjusting bedclothes, sheets and blankets)?: A Lot   -   Sitting down on and standing up from a chair with arms (e.g., wheelchair, bedside commode, etc.): A Lot   -   Moving demonstrate independence with home activity/exercise instructions provided to patient in preparation for discharge.    Goal #5   Current Status IN PROGRESS   Goal #6    Goal #6  Current Status

## 2020-05-20 VITALS
TEMPERATURE: 98 F | WEIGHT: 144.19 LBS | RESPIRATION RATE: 18 BRPM | HEART RATE: 83 BPM | SYSTOLIC BLOOD PRESSURE: 109 MMHG | DIASTOLIC BLOOD PRESSURE: 69 MMHG | OXYGEN SATURATION: 96 % | BODY MASS INDEX: 26 KG/M2

## 2020-05-20 PROCEDURE — 99239 HOSP IP/OBS DSCHRG MGMT >30: CPT | Performed by: INTERNAL MEDICINE

## 2020-05-20 NOTE — PROGRESS NOTES
Patient being discharged to rehab to gain strength. Patient stated that she is scared that she \"won't be able to do what she needs to do. \" Due to aphasia, patient has trouble communicating.  Demonstrated care and concern for patient by offering emotional

## 2020-05-20 NOTE — WOUND PROGRESS NOTE
Wound Care Services  Follow up on the pt. Contact isolation maintained, the pt. just finished working with PT, she is up in the chair at the bedside, she is alert, oriented x 3, her speech is garbled at times and she had trouble finding her words at times.

## 2020-05-20 NOTE — PLAN OF CARE
VSS, no acute events overnight. Pt is aox2-3, expressive aphasia slowly improving, requiring max assist to stand-pivot. Blue heel boots in place. Incontinent of bladder and bowel, purewick in place. Low dose aspirin and SCD's for DVT prophylaxis.  FLIP Bajwa Consider cultural and social influences on pain and pain management  - Manage/alleviate anxiety  - Utilize distraction and/or relaxation techniques  - Monitor for opioid side effects  - Notify MD/LIP if interventions unsuccessful or patient reports new itzel if the patient needs post-hospital services based on physician/LIP order or complex needs related to functional status, cognitive ability or social support system  Outcome: Progressing

## 2020-05-20 NOTE — DISCHARGE SUMMARY
[facility] AND [facility]  Discharge Summary    Mickie Napoles Patient Status:  Inpatient    1934 MRN [id number]   Location [facility] 4W/SW/SE Attending No att. providers found   Hosp Day # 13 PCP Loretta Garcia DO     Date of Admission:  was found to have a urinary tract infection and treated with rocephin IV and IVF. Pt did fail her swallow evaluation. At baseline, she is A&O x 2 (unable to tell date). Upon examination today, patient is not able to answer questions appropriately.  She Osteoporosis   DEXA -2.7 T-score in July 2019; no improvement on Fosamax, now receiving prolia. Multiple vertebral compression fracture  Status post T8 vertebroplasty in 2005.      Carotid artery stenosis  carotid Doppler 12/2015 showing mild bilater post L2-S1 decompressive laminectomy with bilateral foraminotomies. Dr. Jake Davidson in Aug 2014. Memory changes  She was evaluated by neurology as outpatient. MRI brain shows chronic microvascular disease. discussed memory games and puzzles. hours as needed., Historical, R-0    GABAPENTIN 100 MG Oral Cap  TAKE 1 CAPSULE BY MOUTH THREE TIMES A DAY, Normal, Disp-360 capsule, R-3    Budesonide-Formoterol Fumarate 160-4.5 MCG/ACT Inhalation Aerosol  Inhale 2 puffs into the lungs 2 (two) times charles

## 2020-05-20 NOTE — CM/SW NOTE
10: 05AM   SAL received message from Maddison at [place] who states there is bed availbility at [address] today. [facility] rehab is asking for a COVID retest prior to admissions which was ordered by Waldemar Ham.      SAL spoke with pt's Son, Nash Paris who is aware of t

## 2020-05-20 NOTE — PLAN OF CARE
Problem: Delirium  Goal: Minimize duration of delirium  Description  Interventions:  - Encourage use of hearing aids, eye glasses  - Promote highest level of mobility daily  - Provide frequent reorientation  - Promote wakefulness i.e. lights on, blinds o anticipated neutropenic period  Description  INTERVENTIONS  - Monitor WBC  - Administer growth factors as ordered  - Implement neutropenic guidelines  Outcome: Adequate for Discharge     Problem: SAFETY ADULT - FALL  Goal: Free from fall injury  Descriptio applied and reviewed, Non skid socks in use. bed and chair exit alarm is in use,call light within reach. Bed is in lowest position. Enteric contact isolation maintained, has x3 BM.  Rapid  Covid test negative will be d/c to [facility] later today report will

## 2020-05-20 NOTE — CDS QUERY
.How to Answer this Query    1.) Click \"Edit\" button on the toolbar.  2.) Type an \"X\" in the bracket for the diagnosis that applies. (You may also add additional clinical details as you feel necessary to substantiate your response).    3.) Finally click IS A PERMANENT PART OF THE MEDICAL RECORD

## 2020-05-20 NOTE — PHYSICAL THERAPY NOTE
PHYSICAL THERAPY TREATMENT NOTE - INPATIENT     Room Number: 417/417-A       Presenting Problem: AMS, found on floor at [facility]    Problem List  Principal Problem:    Altered mental status, unspecified altered mental status type  Active Problems:    C. diffici Techniques:  Body mechanics;Repositioning    BALANCE                                                                                                                     Static Sitting: Fair  Dynamic Sitting: Fair           Static Standing: Poor  Dynamic Sta assistance with walker - rolling     Goal #2  Current Status Max A x 2    Goal #3 Patient is able to sit EOB x 10 min with minimal assistance   Goal #3   Current Status Min A 10 minutes   Goal #4 Patient will perform bed to/from chair transfers with RW wit

## 2020-05-20 NOTE — PROGRESS NOTES
[facility]    Progress Note    Garret Mike Patient Status:  Inpatient    1934 MRN [id number]   Location [facility] 4W/SW/SE Attending Juan Jose Heredia DO   Hosp Day # 13 PCP Turner Leigh DO       SUBJECTIVE:  Pt madeline expressive aphasia; clinically c/w encephalitis  Unclear etiology as this was a change from her baseline. Initial head CT negative for acute findings.  MRI: \"Multifocal slightly expansile T2 hyperintense signal abnormality involving the left posterior temp follows vascular surgeon Dr Amanda Barron at [facility] 6/2017 shows moderate stenosis in distal R common femoral artery, complete occlusion of both popliteal arteries. ; underwent stenting and angioplasty with Dr. Cristy Francis; due to claudication, RLE angiogram pe d/c acute rehab (david st[facility], son is in agreement, several other referrals placed as well); ok to d/c when bed available.   Discussed with RN -- may actually have a [facility] bed today     Code status  Pt is DNR         Jake Roman MD  5/20/2020  9:3

## 2020-05-21 ENCOUNTER — PATIENT OUTREACH (OUTPATIENT)
Dept: CASE MANAGEMENT | Age: 85
End: 2020-05-21

## 2020-05-21 NOTE — PROGRESS NOTES
1st attempt Stroke f/u apt request    [address] [facility]  [address] [facility]  [phone]    Spoke with patients son Syeda Hathaway. Patient is at [facility]. Unsure how long patient will be in rehab.  Son will ca

## 2020-05-22 NOTE — CDS QUERY
How to Answer this Query    1.) Click \"Edit Button\" on the toolbar  2.) Type an \"X\" in the bracket for the diagnosis that applies. (You may also add additional clinical details as you feel necessary to substantiate your response).    3.) Finally click \ You!    THIS FORM IS A PERMANENT PART OF THE MEDICAL RECORD

## 2020-05-26 RX ORDER — QUETIAPINE 25 MG/1
TABLET, FILM COATED ORAL
Qty: 90 TABLET | Refills: 0 | Status: SHIPPED | OUTPATIENT
Start: 2020-05-26 | End: 2020-09-23

## 2020-06-08 RX ORDER — ASPIRIN 81 MG/1
TABLET, COATED ORAL
Qty: 90 TABLET | Refills: 3 | OUTPATIENT
Start: 2020-06-08

## 2020-06-08 NOTE — TELEPHONE ENCOUNTER
Current refill request refused due to refill is either a duplicate request or has active refills at the pharmacy. Check previous templates.     Requested Prescriptions     Refused Prescriptions Disp Refills   • ASPIRIN LOW DOSE 81 MG Oral Tab EC [Pharmacy

## 2020-06-18 NOTE — TELEPHONE ENCOUNTER
Flutter valve therapy started w/pt. To help mobilize secretions in hospital and for at home. IS also started to help with lung expansion.    Will stop medicine for now; pt hospitalized.

## 2020-06-25 ENCOUNTER — TELEPHONE (OUTPATIENT)
Dept: INTERNAL MEDICINE CLINIC | Facility: CLINIC | Age: 85
End: 2020-06-25

## 2020-06-25 NOTE — TELEPHONE ENCOUNTER
Jordyn Solorzano / [facility] calling to request Immunization Records for Flu & Pneumovax    Please fax # [phone]

## 2020-07-08 ENCOUNTER — TELEPHONE (OUTPATIENT)
Dept: INTERNAL MEDICINE CLINIC | Facility: CLINIC | Age: 85
End: 2020-07-08

## 2020-07-08 NOTE — TELEPHONE ENCOUNTER
As FYI to DR. MIRACLE Rodriguez from [facility] and gave verbal approval for plan of care per protocol - verbalized understanding

## 2020-07-08 NOTE — TELEPHONE ENCOUNTER
Michael @ [facility]: Will Dr Carmita Rodríguez be willing to sign home health orders and follow patient?     Best call back: [phone]

## 2020-07-13 ENCOUNTER — TELEPHONE (OUTPATIENT)
Dept: INTERNAL MEDICINE CLINIC | Facility: CLINIC | Age: 85
End: 2020-07-13

## 2020-07-13 NOTE — TELEPHONE ENCOUNTER
Pt discharged from rehab  Admitted to home health, medications changed  Please call to reconcile medications  Tasked to nursing

## 2020-07-13 NOTE — TELEPHONE ENCOUNTER
Patient was discharged from [facility] [address] [place] [address] and [address] [place] [address] last week. She was admitted to [facility] for nursing, pt/ot/. Mary Briscoe will fax us the discharge list and indicate which medications need refilled.  She request a call back once

## 2020-07-14 NOTE — TELEPHONE ENCOUNTER
Fay from [address] :  Faxed medication list this morning, did Dr Antonieta Wallace receive the fax?      Best call back: [phone]

## 2020-07-14 NOTE — TELEPHONE ENCOUNTER
Yes, Fax in MD Inbox. Also, Lexapro & Pantoprazole pended - RX request noted in fax too. Routed to [facility] and Dr. Tereso Devlin. RX may need to be printed for [facility]?

## 2020-07-15 RX ORDER — PANTOPRAZOLE SODIUM 40 MG/1
40 TABLET, DELAYED RELEASE ORAL
Qty: 90 TABLET | Refills: 3 | Status: SHIPPED | OUTPATIENT
Start: 2020-07-15 | End: 2020-08-13

## 2020-07-15 RX ORDER — ASPIRIN 81 MG/1
81 TABLET, COATED ORAL DAILY
COMMUNITY
Start: 2020-03-01 | End: 2020-07-15

## 2020-07-15 RX ORDER — PANTOPRAZOLE SODIUM 40 MG/1
40 TABLET, DELAYED RELEASE ORAL
Qty: 30 TABLET | Refills: 0 | Status: SHIPPED | OUTPATIENT
Start: 2020-07-15 | End: 2020-07-15

## 2020-07-15 RX ORDER — ESCITALOPRAM OXALATE 5 MG/1
5 TABLET ORAL DAILY
Qty: 90 TABLET | Refills: 3 | Status: SHIPPED | OUTPATIENT
Start: 2020-07-15 | End: 2020-09-24

## 2020-07-15 RX ORDER — ESCITALOPRAM OXALATE 5 MG/1
5 TABLET ORAL DAILY
Qty: 30 TABLET | Refills: 0 | Status: SHIPPED | OUTPATIENT
Start: 2020-07-15 | End: 2020-07-15

## 2020-07-15 RX ORDER — AMITRIPTYLINE HYDROCHLORIDE 10 MG/1
10 TABLET, FILM COATED ORAL NIGHTLY
COMMUNITY
Start: 2020-04-25 | End: 2020-07-15

## 2020-07-15 NOTE — TELEPHONE ENCOUNTER
Spoke with [facility]--agree with med list.   RN to update med list in chart    I sent in short supply (30 day) to [facility] in [facility] and then 1 year supply to mail order for lexapro and pantoprazole.

## 2020-07-17 NOTE — TELEPHONE ENCOUNTER
Fay/[facility] is calling patient refused new medication  Lexapro & Pantoprazole    She wants to use her old inhaler Symbicort    Phone [phone]

## 2020-07-20 NOTE — TELEPHONE ENCOUNTER
Please notify [facility]-it's ok to stop pantoprazole. Pt cannot abruptly stop the lexapro---she has been on this since being in the hospital--I would ask her to continue taking for now and then we can discuss tapering off of this when I see her in follow up.

## 2020-07-21 ENCOUNTER — TELEPHONE (OUTPATIENT)
Dept: INTERNAL MEDICINE CLINIC | Facility: CLINIC | Age: 85
End: 2020-07-21

## 2020-07-21 ENCOUNTER — HOSPITAL ENCOUNTER (EMERGENCY)
Facility: HOSPITAL | Age: 85
Discharge: HOME | End: 2020-07-21
Attending: EMERGENCY MEDICINE
Payer: MEDICARE

## 2020-07-21 VITALS
DIASTOLIC BLOOD PRESSURE: 69 MMHG | WEIGHT: 140 LBS | BODY MASS INDEX: 25.76 KG/M2 | HEART RATE: 82 BPM | TEMPERATURE: 99 F | HEIGHT: 62 IN | OXYGEN SATURATION: 97 % | SYSTOLIC BLOOD PRESSURE: 137 MMHG | RESPIRATION RATE: 16 BRPM

## 2020-07-21 DIAGNOSIS — N30.90 CYSTITIS: Primary | ICD-10-CM

## 2020-07-21 LAB
ANION GAP SERPL CALC-SCNC: 3 MMOL/L (ref 0–18)
BASOPHILS # BLD AUTO: 0.05 X10(3) UL (ref 0–0.2)
BASOPHILS NFR BLD AUTO: 0.8 %
BILIRUB UR QL: NEGATIVE
BUN BLD-MCNC: 25 MG/DL (ref 7–18)
BUN/CREAT SERPL: 22.9 (ref 10–20)
CALCIUM BLD-MCNC: 9.2 MG/DL (ref 8.5–10.1)
CHLORIDE SERPL-SCNC: 112 MMOL/L (ref 98–112)
CO2 SERPL-SCNC: 30 MMOL/L (ref 21–32)
COLOR UR: YELLOW
CREAT BLD-MCNC: 1.09 MG/DL (ref 0.55–1.02)
DEPRECATED RDW RBC AUTO: 57.6 FL (ref 35.1–46.3)
EOSINOPHIL # BLD AUTO: 0.35 X10(3) UL (ref 0–0.7)
EOSINOPHIL NFR BLD AUTO: 5.3 %
ERYTHROCYTE [DISTWIDTH] IN BLOOD BY AUTOMATED COUNT: 15.9 % (ref 11–15)
GLUCOSE BLD-MCNC: 93 MG/DL (ref 70–99)
GLUCOSE UR-MCNC: NEGATIVE MG/DL
HCT VFR BLD AUTO: 38.9 % (ref 35–48)
HGB BLD-MCNC: 12.5 G/DL (ref 12–16)
HGB UR QL STRIP.AUTO: NEGATIVE
IMM GRANULOCYTES # BLD AUTO: 0.04 X10(3) UL (ref 0–1)
IMM GRANULOCYTES NFR BLD: 0.6 %
KETONES UR-MCNC: NEGATIVE MG/DL
LYMPHOCYTES # BLD AUTO: 1 X10(3) UL (ref 1–4)
LYMPHOCYTES NFR BLD AUTO: 15.1 %
MCH RBC QN AUTO: 31.5 PG (ref 26–34)
MCHC RBC AUTO-ENTMCNC: 32.1 G/DL (ref 31–37)
MCV RBC AUTO: 98 FL (ref 80–100)
MONOCYTES # BLD AUTO: 0.63 X10(3) UL (ref 0.1–1)
MONOCYTES NFR BLD AUTO: 9.5 %
NEUTROPHILS # BLD AUTO: 4.54 X10 (3) UL (ref 1.5–7.7)
NEUTROPHILS # BLD AUTO: 4.54 X10(3) UL (ref 1.5–7.7)
NEUTROPHILS NFR BLD AUTO: 68.7 %
NITRITE UR QL STRIP.AUTO: NEGATIVE
OSMOLALITY SERPL CALC.SUM OF ELEC: 304 MOSM/KG (ref 275–295)
PH UR: 8 [PH] (ref 5–8)
PLATELET # BLD AUTO: 173 10(3)UL (ref 150–450)
POTASSIUM SERPL-SCNC: 4.4 MMOL/L (ref 3.5–5.1)
PROT UR-MCNC: 30 MG/DL
RBC # BLD AUTO: 3.97 X10(6)UL (ref 3.8–5.3)
RBC #/AREA URNS AUTO: 6 /HPF
SODIUM SERPL-SCNC: 145 MMOL/L (ref 136–145)
SP GR UR STRIP: 1.02 (ref 1–1.03)
UROBILINOGEN UR STRIP-ACNC: <2
WBC # BLD AUTO: 6.6 X10(3) UL (ref 4–11)
WBC #/AREA URNS AUTO: 218 /HPF

## 2020-07-21 PROCEDURE — 81001 URINALYSIS AUTO W/SCOPE: CPT | Performed by: EMERGENCY MEDICINE

## 2020-07-21 PROCEDURE — 96365 THER/PROPH/DIAG IV INF INIT: CPT

## 2020-07-21 PROCEDURE — 99284 EMERGENCY DEPT VISIT MOD MDM: CPT

## 2020-07-21 PROCEDURE — 85025 COMPLETE CBC W/AUTO DIFF WBC: CPT | Performed by: EMERGENCY MEDICINE

## 2020-07-21 PROCEDURE — 87086 URINE CULTURE/COLONY COUNT: CPT | Performed by: EMERGENCY MEDICINE

## 2020-07-21 PROCEDURE — 80048 BASIC METABOLIC PNL TOTAL CA: CPT | Performed by: EMERGENCY MEDICINE

## 2020-07-21 PROCEDURE — 87088 URINE BACTERIA CULTURE: CPT | Performed by: EMERGENCY MEDICINE

## 2020-07-21 PROCEDURE — 87186 SC STD MICRODIL/AGAR DIL: CPT | Performed by: EMERGENCY MEDICINE

## 2020-07-21 RX ORDER — CEPHALEXIN 500 MG/1
500 CAPSULE ORAL 2 TIMES DAILY
Qty: 14 CAPSULE | Refills: 0 | Status: SHIPPED | OUTPATIENT
Start: 2020-07-21 | End: 2020-07-22

## 2020-07-21 RX ORDER — VANCOMYCIN HYDROCHLORIDE 125 MG/1
125 CAPSULE ORAL ONCE
Status: COMPLETED | OUTPATIENT
Start: 2020-07-21 | End: 2020-07-21

## 2020-07-21 NOTE — TELEPHONE ENCOUNTER
Relayed MD message to [employer], who verified understanding. Pt does not need refill of Symbicort at this time.

## 2020-07-21 NOTE — ED NOTES
Care transferred to [facility] EMS personnel. At time of discharge, patient awake/alert/verbal and in no acute distress. All discharge documentation accompanying patient. IV dc'd. Patient denies any complaints at this time.

## 2020-07-21 NOTE — ED PROVIDER NOTES
Patient Seen in: [facility] AND [facility] Emergency Department      History   Patient presents with:  Pelvic Pain    Stated Complaint: pelvic pain    HPI    43-year-old female presents for evaluation of pelvic discomfort.   Patient reports for the past few days [07/21/20 1446]   /79   Pulse 86   Resp 20   Temp 98.6 °F (37 °C)   Temp src Oral   SpO2 99 %   O2 Device None (Room air)       Current:/69   Pulse 82   Temp 98.6 °F (37 °C) (Oral)   Resp 16   Ht 157.5 cm (5' 2\")   Wt 63.5 kg   SpO2 97%   BMI DIFFERENTIAL - Abnormal; Notable for the following components:    RDW-SD 57.6 (*)     RDW 15.9 (*)     All other components within normal limits   CBC WITH DIFFERENTIAL WITH PLATELET    Narrative:      The following orders were created for panel order CBC W

## 2020-07-21 NOTE — TELEPHONE ENCOUNTER
Patient son Elaina Mathis called. Asking Dr. Andrae Barroso to call him to update him on ER visit. Hospital ER will not give him any info.     Best number to contact patient alison Mathis at [phone]

## 2020-07-21 NOTE — TELEPHONE ENCOUNTER
Spoke with patient and relayed Dr. Caitlin Moran recommendations. Reviewed rationale and risks of delayed evaluation. Patient agreeable, but asks how she should get there. She was still very concerned that it is her son's birthday.  She declines ambulance and asks

## 2020-07-21 NOTE — TELEPHONE ENCOUNTER
To Dr. Femi Pastor----    Spoke to patient who reports dry cough that started yesterday. Denies SOB/difficulty breathing. States she has been using her proair which has been helping. Per RN, patient's temp 95.5.  Patient does report chills last night but believes it

## 2020-07-21 NOTE — TELEPHONE ENCOUNTER
Fay from [facility]. Patient not doing well. Has new onset cough, severe lower abdominal pain, no other UTI symptoms. Bowel movement are good, no fever but chills yesterday. Using inhaler a lot.  Can hear very fine crackle on right lower lobe l

## 2020-07-22 RX ORDER — CEPHALEXIN 500 MG/1
500 CAPSULE ORAL 2 TIMES DAILY
Qty: 14 CAPSULE | Refills: 0 | Status: SHIPPED | OUTPATIENT
Start: 2020-07-22 | End: 2020-07-29

## 2020-07-22 NOTE — TELEPHONE ENCOUNTER
Patient was discharged. DX: Clinical Impression:  Cystitis  (primary encounter diagnosis)    On abx vanco and keflex. Urine culture still in process. To Dr. Finch Rising: ER advised f/u with pcp in 2 days.  Please advise

## 2020-07-22 NOTE — TELEPHONE ENCOUNTER
Pt son Fitz  [phone] he would like a call from  per pt care. He could not get any information on the ER visit.  Asking to please call him

## 2020-07-22 NOTE — TELEPHONE ENCOUNTER
S/w son Yo Sportsman and relayed MDs message-verbalized understanding. Son inquired about antibiotics that were prescribed in the ER and where they were sent too. Script for vanco was printed and Keflex was sent eRx to [facility].  Patient requested to send script t

## 2020-07-22 NOTE — TELEPHONE ENCOUNTER
Please notify son that pt was complaining of 10/10 pain to her abdomen so I had recommended she go to the ER for evaluation. She does have a urinary tract infection and was given antibiotics for this. Please encourage her to hydrate.    She was also given a

## 2020-07-23 RX ORDER — AMITRIPTYLINE HYDROCHLORIDE 10 MG/1
TABLET, FILM COATED ORAL
Qty: 90 TABLET | Refills: 3 | OUTPATIENT
Start: 2020-07-23

## 2020-07-24 ENCOUNTER — TELEPHONE (OUTPATIENT)
Dept: INTERNAL MEDICINE CLINIC | Facility: CLINIC | Age: 85
End: 2020-07-24

## 2020-07-24 ENCOUNTER — TELEMEDICINE (OUTPATIENT)
Dept: INTERNAL MEDICINE CLINIC | Facility: CLINIC | Age: 85
End: 2020-07-24

## 2020-07-24 DIAGNOSIS — R19.7 DIARRHEA OF PRESUMED INFECTIOUS ORIGIN: ICD-10-CM

## 2020-07-24 DIAGNOSIS — N39.0 RECURRENT UTI: Primary | ICD-10-CM

## 2020-07-24 PROBLEM — E87.3 RESPIRATORY ALKALOSIS: Status: RESOLVED | Noted: 2019-12-31 | Resolved: 2020-07-24

## 2020-07-24 PROBLEM — R09.02 HYPOXIA: Status: RESOLVED | Noted: 2018-06-29 | Resolved: 2020-07-24

## 2020-07-24 PROBLEM — R06.89 RESPIRATORY INSUFFICIENCY: Status: RESOLVED | Noted: 2018-06-30 | Resolved: 2020-07-24

## 2020-07-24 PROBLEM — J06.9 URI, ACUTE: Status: RESOLVED | Noted: 2019-12-31 | Resolved: 2020-07-24

## 2020-07-24 PROBLEM — N28.9 ACUTE RENAL INSUFFICIENCY: Status: RESOLVED | Noted: 2020-05-12 | Resolved: 2020-07-24

## 2020-07-24 PROBLEM — J44.1 COPD EXACERBATION (HCC): Status: RESOLVED | Noted: 2019-12-31 | Resolved: 2020-07-24

## 2020-07-24 PROBLEM — A49.8 CLOSTRIDIUM DIFFICILE INFECTION: Status: RESOLVED | Noted: 2020-03-19 | Resolved: 2020-07-24

## 2020-07-24 PROBLEM — R41.82 ALTERED MENTAL STATUS, UNSPECIFIED ALTERED MENTAL STATUS TYPE: Status: RESOLVED | Noted: 2020-05-05 | Resolved: 2020-07-24

## 2020-07-24 PROBLEM — J96.91 RESPIRATORY FAILURE WITH HYPOXIA, UNSPECIFIED CHRONICITY (HCC): Status: RESOLVED | Noted: 2019-12-31 | Resolved: 2020-07-24

## 2020-07-24 PROCEDURE — 99213 OFFICE O/P EST LOW 20 MIN: CPT | Performed by: INTERNAL MEDICINE

## 2020-07-24 NOTE — PROGRESS NOTES
Virtual Telephone Check-In    Mickie Napoles verbally consents to a Virtual/Telephone Check-In visit on 07/24/20. Patient has been referred to the [facility] website at [email or web address] to review the yearly Consent to Treat document.     Esmer ezetimibe 10 mg po qD     Osteoporosis   DEXA -2.7 T-score in July 2019; no improvement on Fosamax, now receiving prolia.      Multiple vertebral compression fracture  Status post T8 vertebroplasty in 2005.     Carotid artery stenosis  carotid Doppler 12/2 Xarelto.     OA spine  Status post L2-S1 decompressive laminectomy with bilateral foraminotomies. Dr. Samson Marcelo.     Memory changes  She was evaluated by neurology as outpatient.  MRI brain shows chronic microvascular disease. discus

## 2020-07-24 NOTE — TELEPHONE ENCOUNTER
Refill request has failed the Ambulatory Medication Refill Standing Order:    Requested Prescriptions     Refused Prescriptions Disp Refills   • AMITRIPTYLINE HCL 10 MG Oral Tab [Pharmacy Med Name: AMITRIPTYLINE HCL 10 MG TAB] 90 tablet 3     Sig: TAKE 1 T

## 2020-07-28 ENCOUNTER — TELEPHONE (OUTPATIENT)
Dept: INTERNAL MEDICINE CLINIC | Facility: CLINIC | Age: 85
End: 2020-07-28

## 2020-07-28 NOTE — TELEPHONE ENCOUNTER
Please tell [facility] that patient does not need any additional antibiotics (keflex, vancomycin) as she should have completed therapy at this time. I have not received results of a cdiff test--was this done yesterday?

## 2020-07-28 NOTE — TELEPHONE ENCOUNTER
Spoke with Ty Kanner and relayed MD message, she verbalizes understanding. She states the patient has her last dose to take tonight but her mail order sent her a whole new bottle. Informed Ty Kanner I would also call patient to relay MD message. Spoke to patient and relayed MD message and instructions, patient verbalizes understanding and agrees with plan. To Dr. Roxana Shi-- per Ty Kanner, lab did not  stool sample yesterday, Ty Kanner rescheduled it and had it picked up today.

## 2020-07-28 NOTE — TELEPHONE ENCOUNTER
Ana James / [employer] is calling patient has been treated for UTI for 1 week  She will be done with the 1 week treatment tonight, Cephalexin  Patient received 14 pills from the mail order pharmacy  Should patient continue the Cephalexin & Vancomycin?     She is with the patient now sorting her medication, please call back asap [phone]

## 2020-07-31 ENCOUNTER — TELEPHONE (OUTPATIENT)
Dept: INTERNAL MEDICINE CLINIC | Facility: CLINIC | Age: 85
End: 2020-07-31

## 2020-07-31 RX ORDER — VANCOMYCIN HYDROCHLORIDE 125 MG/1
125 CAPSULE ORAL 4 TIMES DAILY
Qty: 40 CAPSULE | Refills: 0 | Status: SHIPPED | OUTPATIENT
Start: 2020-07-31 | End: 2020-08-11

## 2020-07-31 NOTE — TELEPHONE ENCOUNTER
Spoke to Blake and relayed MD message. Blake verbalized understanding.  Rx sent to [facility]

## 2020-07-31 NOTE — TELEPHONE ENCOUNTER
To nursing, please tell patient stool C. difficile positive. I pended Rx for vancomycin 125 mg 4 times daily for 10 days. Please send it to the pharmacy she'd like. Thanks. Noted to nursing and to Dr Amari Canales as Miky Pastor.

## 2020-07-31 NOTE — TELEPHONE ENCOUNTER
To Dr. Wing Keys-----    Are you able to advise in MD absence? Or Ok to await MD return on Monday?  Dr. Susana King did order test for c diff per TE on 7/28

## 2020-08-04 ENCOUNTER — TELEPHONE (OUTPATIENT)
Dept: INTERNAL MEDICINE CLINIC | Facility: CLINIC | Age: 85
End: 2020-08-04

## 2020-08-04 NOTE — TELEPHONE ENCOUNTER
Spoke to pt and advised on MD message below; pt verbalized understanding. Contact info provided to patient.

## 2020-08-04 NOTE — TELEPHONE ENCOUNTER
Yes- I had also spoken to Puma Huizar (her son) about this so check with him.  You need to see Dr. Jimmy Sam regarding the frequent urinary tract infections (not cdiff)

## 2020-08-04 NOTE — TELEPHONE ENCOUNTER
Patient is asking if she is suppose to make an appointment with Dr. Gila Moreau, urologist as a follow up to her frequent C-diff issues.

## 2020-08-10 RX ORDER — EZETIMIBE 10 MG/1
TABLET ORAL
Qty: 90 TABLET | Refills: 3 | Status: SHIPPED | OUTPATIENT
Start: 2020-08-10 | End: 2020-09-23

## 2020-08-10 RX ORDER — CLOPIDOGREL BISULFATE 75 MG/1
TABLET ORAL
Qty: 90 TABLET | Refills: 3 | Status: SHIPPED | OUTPATIENT
Start: 2020-08-10 | End: 2020-09-23

## 2020-08-10 NOTE — TELEPHONE ENCOUNTER
Patient has one more day of vancomycin   Will she need a refill ?     Please call son, Dave Jennings, to advise [phone]

## 2020-08-11 RX ORDER — VANCOMYCIN HYDROCHLORIDE 125 MG/1
125 CAPSULE ORAL 4 TIMES DAILY
Qty: 40 CAPSULE | Refills: 0 | Status: SHIPPED | OUTPATIENT
Start: 2020-08-11 | End: 2021-02-11

## 2020-08-11 NOTE — TELEPHONE ENCOUNTER
To Dr. Bettina Madrigal to advise in PCP absence--- Spoke to Dave Jennings patient's son who states pt is having loose stools still after completing 10 day Vancomycin therapy for C. Diff.  Spoke to patient who confirms this-comparing stool to pudding-like texture, stating h

## 2020-08-11 NOTE — TELEPHONE ENCOUNTER
Patient son Minerva Contreras is calling, still waiting for call back. Patient is completely out of medication.     Please call Thomas Oshea back at [phone]

## 2020-08-11 NOTE — TELEPHONE ENCOUNTER
Spoke to Trina Mcdonnell and relayed MD message. Trina Mcdonnell verbalized understanding. Rx sent per MD written order.

## 2020-08-12 ENCOUNTER — TELEPHONE (OUTPATIENT)
Dept: INTERNAL MEDICINE CLINIC | Facility: CLINIC | Age: 85
End: 2020-08-12

## 2020-08-12 NOTE — TELEPHONE ENCOUNTER
I spoke with patient's son, Brenna Avila, [place] per Balaji. He is calling because his mom called him today and said she is having ankle swelling. He does not think she is having any other symptoms. He would need a late appointment to bring her in today.  Brenna Avila is agreeabl

## 2020-08-12 NOTE — TELEPHONE ENCOUNTER
Patient would need longer than acute visit since I do not know this lady. --Please arrange for 40-minute slot for her. If this cannot be done today then tomorrow would be fine.

## 2020-08-13 ENCOUNTER — APPOINTMENT (OUTPATIENT)
Dept: LAB | Age: 85
End: 2020-08-13
Attending: INTERNAL MEDICINE
Payer: MEDICARE

## 2020-08-13 ENCOUNTER — OFFICE VISIT (OUTPATIENT)
Dept: INTERNAL MEDICINE CLINIC | Facility: CLINIC | Age: 85
End: 2020-08-13
Payer: MEDICARE

## 2020-08-13 VITALS
HEIGHT: 62 IN | DIASTOLIC BLOOD PRESSURE: 70 MMHG | SYSTOLIC BLOOD PRESSURE: 158 MMHG | BODY MASS INDEX: 27.97 KG/M2 | TEMPERATURE: 98 F | HEART RATE: 82 BPM | WEIGHT: 152 LBS

## 2020-08-13 DIAGNOSIS — R60.9 EDEMA, UNSPECIFIED TYPE: ICD-10-CM

## 2020-08-13 DIAGNOSIS — N28.89 RENAL MASS: ICD-10-CM

## 2020-08-13 DIAGNOSIS — N39.0 URINARY TRACT INFECTION WITHOUT HEMATURIA, SITE UNSPECIFIED: ICD-10-CM

## 2020-08-13 DIAGNOSIS — R60.9 EDEMA, UNSPECIFIED TYPE: Primary | ICD-10-CM

## 2020-08-13 DIAGNOSIS — R06.02 SHORTNESS OF BREATH: ICD-10-CM

## 2020-08-13 DIAGNOSIS — A04.72 C. DIFFICILE DIARRHEA: ICD-10-CM

## 2020-08-13 DIAGNOSIS — I73.9 PAD (PERIPHERAL ARTERY DISEASE) (HCC): ICD-10-CM

## 2020-08-13 LAB
ANION GAP SERPL CALC-SCNC: 5 MMOL/L (ref 0–18)
BUN BLD-MCNC: 22 MG/DL (ref 7–18)
BUN/CREAT SERPL: 22 (ref 10–20)
CALCIUM BLD-MCNC: 9.5 MG/DL (ref 8.5–10.1)
CHLORIDE SERPL-SCNC: 113 MMOL/L (ref 98–112)
CO2 SERPL-SCNC: 26 MMOL/L (ref 21–32)
CREAT BLD-MCNC: 1 MG/DL (ref 0.55–1.02)
GLUCOSE BLD-MCNC: 82 MG/DL (ref 70–99)
NT-PROBNP SERPL-MCNC: 172 PG/ML (ref ?–450)
OSMOLALITY SERPL CALC.SUM OF ELEC: 300 MOSM/KG (ref 275–295)
PATIENT FASTING Y/N/NP: NO
POTASSIUM SERPL-SCNC: 4.2 MMOL/L (ref 3.5–5.1)
SODIUM SERPL-SCNC: 144 MMOL/L (ref 136–145)

## 2020-08-13 PROCEDURE — 83880 ASSAY OF NATRIURETIC PEPTIDE: CPT | Performed by: INTERNAL MEDICINE

## 2020-08-13 PROCEDURE — 80048 BASIC METABOLIC PNL TOTAL CA: CPT

## 2020-08-13 PROCEDURE — 99214 OFFICE O/P EST MOD 30 MIN: CPT | Performed by: INTERNAL MEDICINE

## 2020-08-13 PROCEDURE — 36415 COLL VENOUS BLD VENIPUNCTURE: CPT | Performed by: INTERNAL MEDICINE

## 2020-08-13 PROCEDURE — G0463 HOSPITAL OUTPT CLINIC VISIT: HCPCS | Performed by: INTERNAL MEDICINE

## 2020-08-13 PROCEDURE — 81003 URINALYSIS AUTO W/O SCOPE: CPT | Performed by: INTERNAL MEDICINE

## 2020-08-13 RX ORDER — FUROSEMIDE 20 MG/1
20 TABLET ORAL DAILY
Qty: 30 TABLET | Refills: 1 | Status: SHIPPED | OUTPATIENT
Start: 2020-08-13 | End: 2020-08-27

## 2020-08-13 NOTE — PROGRESS NOTES
HPI:    Patient ID: Garret Mike is a 80year old female. Presents with swelling bilateral lower exts x 1 wk  Weight has increased by 12 lbs since 7/21    Notes cloudier urine with bad odor. Denies fever or chills.   No burning with urinati Take 1 tablet (81 mg total) by mouth daily. 90 tablet 3   • atorvastatin 40 MG Oral Tab Take 1 tablet (40 mg total) by mouth once daily.  90 tablet 3   • Albuterol Sulfate HFA (PROAIR HFA) 108 (90 Base) MCG/ACT Inhalation Aero Soln Inhale 2 puffs into the l possible new heart failure. Will check echocardiogram.  Check BNP. Empirically start Lasix 20 mg daily pending results of echocardiogram.  I asked son to get scale to try to obtain daily weights so that we can see if there is proper response to diuresis.

## 2020-08-14 ENCOUNTER — TELEPHONE (OUTPATIENT)
Dept: INTERNAL MEDICINE CLINIC | Facility: CLINIC | Age: 85
End: 2020-08-14

## 2020-08-14 LAB
BILIRUB UR QL: NEGATIVE
COLOR UR: YELLOW
GLUCOSE UR-MCNC: NEGATIVE MG/DL
HGB UR QL STRIP.AUTO: NEGATIVE
KETONES UR-MCNC: NEGATIVE MG/DL
LEUKOCYTE ESTERASE UR QL STRIP.AUTO: NEGATIVE
NITRITE UR QL STRIP.AUTO: NEGATIVE
PH UR: 5 [PH] (ref 5–8)
PROT UR-MCNC: NEGATIVE MG/DL
SP GR UR STRIP: 1.02 (ref 1–1.03)
UROBILINOGEN UR STRIP-ACNC: <2

## 2020-08-14 NOTE — TELEPHONE ENCOUNTER
----- Message from Shanita Beltre MD sent at 8/14/2020  7:53 AM CDT -----  BNP is normal--therefore heart failure is unlikely to be a cause of patient's current edema. BMP is unremarkable. Therefore continue with diuretic as prescribed yesterday.   Cont

## 2020-08-14 NOTE — TELEPHONE ENCOUNTER
[id number] 3 physician orders &   OT, PT & Speech Plans of Care   that were faxed on Aug 6

## 2020-08-17 ENCOUNTER — TELEPHONE (OUTPATIENT)
Dept: INTERNAL MEDICINE CLINIC | Facility: CLINIC | Age: 85
End: 2020-08-17

## 2020-08-17 NOTE — TELEPHONE ENCOUNTER
----- Message from Pedro Cifuentes MD sent at 8/14/2020  5:51 PM CDT -----  UA is negative. Therefore UTI has cleared.

## 2020-08-17 NOTE — TELEPHONE ENCOUNTER
[facility] nurse calling back.  Bree Cleary [phone]  Please try her again

## 2020-08-18 ENCOUNTER — HOSPITAL ENCOUNTER (OUTPATIENT)
Dept: CV DIAGNOSTICS | Facility: HOSPITAL | Age: 85
Discharge: HOME | End: 2020-08-18
Attending: INTERNAL MEDICINE
Payer: MEDICARE

## 2020-08-18 DIAGNOSIS — R60.9 EDEMA, UNSPECIFIED TYPE: ICD-10-CM

## 2020-08-18 PROCEDURE — 93306 TTE W/DOPPLER COMPLETE: CPT | Performed by: INTERNAL MEDICINE

## 2020-08-19 ENCOUNTER — TELEPHONE (OUTPATIENT)
Dept: INTERNAL MEDICINE CLINIC | Facility: CLINIC | Age: 85
End: 2020-08-19

## 2020-08-19 NOTE — TELEPHONE ENCOUNTER
Called patient  and relayed [facility] message - verbalized understanding  Transferred to [facility] to schedule F/u with DR. DIAZ

## 2020-08-19 NOTE — TELEPHONE ENCOUNTER
----- Message from Renee Ferrari MD sent at 8/19/2020  8:51 AM CDT -----  Echocardiogram showed normal systolic function. There are no significant valvular abnormalities.   However there is some decrease in left ventricular relaxation consistent with [employer]

## 2020-08-25 ENCOUNTER — TELEPHONE (OUTPATIENT)
Dept: INTERNAL MEDICINE CLINIC | Facility: CLINIC | Age: 85
End: 2020-08-25

## 2020-08-25 RX ORDER — CEPHALEXIN 500 MG/1
500 CAPSULE ORAL 2 TIMES DAILY
Qty: 20 CAPSULE | Refills: 0 | Status: SHIPPED | OUTPATIENT
Start: 2020-08-25 | End: 2021-02-11

## 2020-08-25 RX ORDER — CEPHALEXIN 500 MG/1
500 CAPSULE ORAL 2 TIMES DAILY
Qty: 20 CAPSULE | Refills: 0 | Status: SHIPPED | OUTPATIENT
Start: 2020-08-25 | End: 2020-08-25

## 2020-08-25 NOTE — TELEPHONE ENCOUNTER
Pt has an appt w/ Dr Bandar Haley tomorrow    Pt developed severe lower abdominal pain  Pain level is a 10  Same pain that she had a few weeks ago   Cornelius Morales to ER & received antibiotics for a UTI    [facility] to [facility]

## 2020-08-25 NOTE — TELEPHONE ENCOUNTER
Prescription sent to pharmacy on record for [facility]--if patient wants a different pharmacy please, please change prescription.

## 2020-08-25 NOTE — TELEPHONE ENCOUNTER
Alli Elam RN with [facility] states pt developed lower abd pain a couple days ago, rates 10 out of 10 pain. Similar symptoms a month ago when she went to ED and received IV antibiotics for UTI. Pt has no fever, vitals stable, 110/80 BP, 95 pulse, 89% SpO2.  Alli Elam just

## 2020-08-25 NOTE — TELEPHONE ENCOUNTER
Spoke with both Elvis Cao from [facility] as well as patient's son, Yancy Arnold (OK per HIPAA) and relayed MD message and understanding. They verbalize understanding.      Elvis Cao states she will try to have patient give a urine sample today and refrigerate it for appt tomorr

## 2020-08-25 NOTE — TELEPHONE ENCOUNTER
Patient had UTI in July when she went to emergency room  Urine culture grew out Proteus at that time. Patient was placed on Keflex and did note improvement. Therefore we can give this a try again--I have sent off prescription for course of Keflex.   We wi

## 2020-08-26 ENCOUNTER — LAB ENCOUNTER (OUTPATIENT)
Dept: LAB | Age: 85
End: 2020-08-26
Attending: INTERNAL MEDICINE
Payer: MEDICARE

## 2020-08-26 ENCOUNTER — OFFICE VISIT (OUTPATIENT)
Dept: INTERNAL MEDICINE CLINIC | Facility: CLINIC | Age: 85
End: 2020-08-26
Payer: MEDICARE

## 2020-08-26 VITALS
TEMPERATURE: 98 F | DIASTOLIC BLOOD PRESSURE: 60 MMHG | SYSTOLIC BLOOD PRESSURE: 106 MMHG | HEIGHT: 62 IN | OXYGEN SATURATION: 96 % | BODY MASS INDEX: 27.75 KG/M2 | WEIGHT: 150.81 LBS | HEART RATE: 84 BPM

## 2020-08-26 DIAGNOSIS — I51.89 DIASTOLIC DYSFUNCTION: ICD-10-CM

## 2020-08-26 DIAGNOSIS — I10 ESSENTIAL HYPERTENSION: ICD-10-CM

## 2020-08-26 DIAGNOSIS — R30.0 DYSURIA: ICD-10-CM

## 2020-08-26 DIAGNOSIS — I73.9 PAD (PERIPHERAL ARTERY DISEASE) (HCC): ICD-10-CM

## 2020-08-26 DIAGNOSIS — R53.1 WEAKNESS: ICD-10-CM

## 2020-08-26 DIAGNOSIS — N28.89 RENAL MASS, LEFT: ICD-10-CM

## 2020-08-26 DIAGNOSIS — R10.9 ABDOMINAL PAIN, UNSPECIFIED ABDOMINAL LOCATION: Primary | ICD-10-CM

## 2020-08-26 LAB
ANION GAP SERPL CALC-SCNC: 3 MMOL/L (ref 0–18)
BILIRUB UR QL: NEGATIVE
BUN BLD-MCNC: 25 MG/DL (ref 7–18)
BUN/CREAT SERPL: 20.7 (ref 10–20)
CALCIUM BLD-MCNC: 9.3 MG/DL (ref 8.5–10.1)
CHLORIDE SERPL-SCNC: 110 MMOL/L (ref 98–112)
CLARITY UR: CLEAR
CO2 SERPL-SCNC: 30 MMOL/L (ref 21–32)
COLOR UR: YELLOW
CREAT BLD-MCNC: 1.21 MG/DL (ref 0.55–1.02)
GLUCOSE BLD-MCNC: 88 MG/DL (ref 70–99)
GLUCOSE UR-MCNC: NEGATIVE MG/DL
HGB UR QL STRIP.AUTO: NEGATIVE
KETONES UR-MCNC: NEGATIVE MG/DL
LEUKOCYTE ESTERASE UR QL STRIP.AUTO: NEGATIVE
NITRITE UR QL STRIP.AUTO: NEGATIVE
OSMOLALITY SERPL CALC.SUM OF ELEC: 300 MOSM/KG (ref 275–295)
PATIENT FASTING Y/N/NP: NO
PH UR: 6 [PH] (ref 5–8)
POTASSIUM SERPL-SCNC: 4.5 MMOL/L (ref 3.5–5.1)
PROT UR-MCNC: NEGATIVE MG/DL
SODIUM SERPL-SCNC: 143 MMOL/L (ref 136–145)
SP GR UR STRIP: 1.01 (ref 1–1.03)
UROBILINOGEN UR STRIP-ACNC: <2

## 2020-08-26 PROCEDURE — 99214 OFFICE O/P EST MOD 30 MIN: CPT | Performed by: INTERNAL MEDICINE

## 2020-08-26 PROCEDURE — 81003 URINALYSIS AUTO W/O SCOPE: CPT | Performed by: INTERNAL MEDICINE

## 2020-08-26 PROCEDURE — G0463 HOSPITAL OUTPT CLINIC VISIT: HCPCS | Performed by: INTERNAL MEDICINE

## 2020-08-26 PROCEDURE — 36415 COLL VENOUS BLD VENIPUNCTURE: CPT

## 2020-08-26 PROCEDURE — 80048 BASIC METABOLIC PNL TOTAL CA: CPT

## 2020-08-26 NOTE — PROGRESS NOTES
HPI:    Patient ID: Ana Smith is a 80year old female. Notes lower abd pain x several days  Denies pain or burning with urination. However, when patient had similar symptoms in the past, she was diagnosed with UTI.   Therefore patient em vancomycin HCl 125 MG Oral Cap Take 1 capsule (125 mg total) by mouth 4 (four) times daily.  40 capsule 0   • EZETIMIBE 10 MG Oral Tab TAKE 1 TABLET BY MOUTH  DAILY 90 tablet 3   • CLOPIDOGREL BISULFATE 75 MG Oral Tab TAKE 1 TABLET BY MOUTH  DAILY 90 tablet bilaterally without any rebound or guarding. There is no CVA tenderness     Musculoskeletal:         General: Edema (trace edema lower exts) present. Neurological: She is alert. Psychiatric: She has a normal mood and affect.               ASSESSMENT/ [3020]      Basic Metabolic Panel (8) [E]      Meds This Visit:  Requested Prescriptions      No prescriptions requested or ordered in this encounter       Imaging & Referrals:  PHYSICAL THERAPY EXTERNAL  US ABDOMEN COMPLETE (CPT=76700)       OO#[id number]

## 2020-08-27 ENCOUNTER — TELEPHONE (OUTPATIENT)
Dept: INTERNAL MEDICINE CLINIC | Facility: CLINIC | Age: 85
End: 2020-08-27

## 2020-08-27 DIAGNOSIS — N28.9 ACUTE RENAL INSUFFICIENCY: Primary | ICD-10-CM

## 2020-08-27 RX ORDER — FUROSEMIDE 20 MG/1
20 TABLET ORAL EVERY OTHER DAY
Qty: 1 TABLET | Refills: 0 | COMMUNITY
Start: 2020-08-27 | End: 2020-09-23

## 2020-08-27 NOTE — TELEPHONE ENCOUNTER
Spoke with both patient and her son Lc Yap (OK per HIPAA) and relayed MD message and instruction, they verbalized understanding and agreed with plan. Med module updated with new dose of furosemide.

## 2020-08-27 NOTE — TELEPHONE ENCOUNTER
----- Message from Sally Aparicio MD sent at 8/27/2020  4:02 PM CDT -----  Urinalysis is normal.  However this was obtained after patient had already started antibiotics for treatment of UTI. Therefore would complete course for now.   Labs show that crea

## 2020-09-01 ENCOUNTER — TELEPHONE (OUTPATIENT)
Dept: INTERNAL MEDICINE CLINIC | Facility: CLINIC | Age: 85
End: 2020-09-01

## 2020-09-01 NOTE — TELEPHONE ENCOUNTER
Reviewed Dr. Mily Encians messages from 8/19/20, 8/26/20, 8/27/20 with pt and son Mary Charles per HIPAA - understanding verbalized. Left message to call back for Fay/Re CHINCHILLA to review same.

## 2020-09-01 NOTE — TELEPHONE ENCOUNTER
Chetna Gentile / [employer] is calling patient would like the results of her Echocardiogram done at [address] [place] [address] 2 weeks ago

## 2020-09-02 ENCOUNTER — PATIENT OUTREACH (OUTPATIENT)
Dept: CASE MANAGEMENT | Age: 85
End: 2020-09-02

## 2020-09-05 ENCOUNTER — HOSPITAL ENCOUNTER (OUTPATIENT)
Dept: ULTRASOUND IMAGING | Facility: HOSPITAL | Age: 85
Discharge: HOME | End: 2020-09-05
Attending: INTERNAL MEDICINE
Payer: MEDICARE

## 2020-09-05 DIAGNOSIS — R10.9 ABDOMINAL PAIN, UNSPECIFIED ABDOMINAL LOCATION: ICD-10-CM

## 2020-09-05 PROCEDURE — 76700 US EXAM ABDOM COMPLETE: CPT | Performed by: INTERNAL MEDICINE

## 2020-09-08 ENCOUNTER — TELEPHONE (OUTPATIENT)
Dept: INTERNAL MEDICINE CLINIC | Facility: CLINIC | Age: 85
End: 2020-09-08

## 2020-09-08 NOTE — TELEPHONE ENCOUNTER
Called patient's listed phone number, but reached son, Silverio Early, on his cell. Per HIPAA, I relayed message to Silverio Early. All questions answered. Reviewed and reinforced message as needed. Silverio Early verbalized understanding and asks that patient also be called with message on her home phone: [phone]. Phone number updated. Spoke with patient and relayed result note message from Dr. Opal Figueroa. Patient verbalized understanding. She reports her abdominal pain is \"much better. \" This RN instructed patient to call with further abdominal pain/concerns. She verbalized understanding and agrees to plan. She was glad to hear that the message was also relayed to her son. FYMELISSA Figueroa.

## 2020-09-08 NOTE — TELEPHONE ENCOUNTER
Orders faxed to [facility] at [phone]. Fax confirmation received, sent to scan.

## 2020-09-08 NOTE — TELEPHONE ENCOUNTER
----- Message from Eleazar Yousif MD sent at 9/8/2020 10:22 AM CDT -----  Abdominal ultrasound is showing 1.6 cm lesion in right kidney which most likely corresponds to cyst.  However it also does show a 1.5 cm lesion in left kidney which which is similar to findings seen on MRI done in May 2019--does not appear to have increased in size. Patient is previously aware that this may be a slow-growing neoplasm--she has seen urology in the past and has decided against further evaluation of left renal mass. Otherwise ultrasound is unremarkable. If abdominal pain persists, would recommend GI evaluation.

## 2020-09-08 NOTE — TELEPHONE ENCOUNTER
Patient is due for discharge from home health    Also requests call back with results from echocardiogram - per Agnus patient did not understand results she was given    [facility] [phone]

## 2020-09-08 NOTE — TELEPHONE ENCOUNTER
LMTCB for Fay    EKG result note from Dr. Jay Serrano:  \"Echocardiogram showed normal systolic function. There are no significant valvular abnormalities. However there is some decrease in left ventricular relaxation consistent with diastolic dysfunction.    Teri Adan

## 2020-09-08 NOTE — TELEPHONE ENCOUNTER
Fay calling back-- relayed MD messaged below--clarified patient should be taking Lasix 20 mg every other day currently. Dr. DIAZ---OK to discharge patient from [facility]? Nursing to call Angeles Cannon back only if Dr. Toni Hickman does not want to discharge pt from [facility] nursing.

## 2020-09-22 NOTE — TELEPHONE ENCOUNTER
Patient called back   Following up on message she left earlier   re: refills  Not sure what to do next    Requests that we call back to advise

## 2020-09-22 NOTE — TELEPHONE ENCOUNTER
Pt. Was contacted she confirmed that she wants all of her meds sent over to pill pack ph.  # [phone]   Routed to Rx

## 2020-09-23 NOTE — TELEPHONE ENCOUNTER
To DR.H Onel germain has not seen  in a year;  Please review meds for pill pack service anselmo.  Furosemide as that was started by you in August;  Also Seroquel was started 3/2020 but  only sent 90 day supply in May

## 2020-09-24 RX ORDER — QUETIAPINE 25 MG/1
25 TABLET, FILM COATED ORAL EVERY EVENING
Qty: 90 TABLET | Refills: 3 | Status: SHIPPED | OUTPATIENT
Start: 2020-09-24 | End: 2020-10-21

## 2020-09-24 RX ORDER — ESCITALOPRAM OXALATE 5 MG/1
5 TABLET ORAL DAILY
Qty: 90 TABLET | Refills: 3 | Status: SHIPPED | OUTPATIENT
Start: 2020-09-24 | End: 2020-10-21

## 2020-09-24 RX ORDER — ASPIRIN 81 MG/1
81 TABLET ORAL DAILY
Qty: 90 TABLET | Refills: 3 | Status: SHIPPED | OUTPATIENT
Start: 2020-09-24 | End: 2020-10-21

## 2020-09-24 RX ORDER — EZETIMIBE 10 MG/1
10 TABLET ORAL DAILY
Qty: 90 TABLET | Refills: 3 | Status: SHIPPED | OUTPATIENT
Start: 2020-09-24 | End: 2020-10-21

## 2020-09-24 RX ORDER — ACETAMINOPHEN 500 MG
1 TABLET ORAL 2 TIMES DAILY
Qty: 180 TABLET | Refills: 3 | Status: SHIPPED | OUTPATIENT
Start: 2020-09-24 | End: 2020-10-21

## 2020-09-24 RX ORDER — FUROSEMIDE 20 MG/1
20 TABLET ORAL EVERY OTHER DAY
Qty: 45 TABLET | Refills: 3 | Status: SHIPPED | OUTPATIENT
Start: 2020-09-24 | End: 2020-10-21

## 2020-09-24 RX ORDER — CLOPIDOGREL BISULFATE 75 MG/1
75 TABLET ORAL DAILY
Qty: 90 TABLET | Refills: 3 | Status: SHIPPED | OUTPATIENT
Start: 2020-09-24 | End: 2020-10-21

## 2020-09-24 RX ORDER — ATORVASTATIN CALCIUM 40 MG/1
40 TABLET, FILM COATED ORAL
Qty: 90 TABLET | Refills: 3 | Status: SHIPPED | OUTPATIENT
Start: 2020-09-24 | End: 2020-10-21

## 2020-10-15 RX ORDER — AMITRIPTYLINE HYDROCHLORIDE 10 MG/1
TABLET, FILM COATED ORAL
Qty: 90 TABLET | Refills: 3 | OUTPATIENT
Start: 2020-10-15

## 2020-10-15 RX ORDER — ASPIRIN 81 MG/1
TABLET, COATED ORAL
Qty: 90 TABLET | Refills: 3 | OUTPATIENT
Start: 2020-10-15

## 2020-10-15 NOTE — TELEPHONE ENCOUNTER
Current refill request refused due to refill is either a duplicate request or has active refills at the pharmacy. Check previous templates.     Requested Prescriptions     Refused Prescriptions Disp Refills   • AMITRIPTYLINE HCL 10 MG Oral Tab [Pharmacy Me

## 2020-10-19 NOTE — TELEPHONE ENCOUNTER
Son, Lc Yap, is calling per patient to request that the below medications be sent to her local pharmacy.  Med[facility] were sent to [facility]  on 9/24/2020 [facility] informed the [facility] that will not be sending her her medication because they do not ta

## 2020-10-21 RX ORDER — ATORVASTATIN CALCIUM 40 MG/1
40 TABLET, FILM COATED ORAL
Qty: 90 TABLET | Refills: 0 | Status: SHIPPED | OUTPATIENT
Start: 2020-10-21 | End: 2021-02-16

## 2020-10-21 RX ORDER — CLOPIDOGREL BISULFATE 75 MG/1
75 TABLET ORAL DAILY
Qty: 90 TABLET | Refills: 3 | Status: SHIPPED | OUTPATIENT
Start: 2020-10-21 | End: 2020-10-21

## 2020-10-21 RX ORDER — ATORVASTATIN CALCIUM 40 MG/1
40 TABLET, FILM COATED ORAL
Qty: 90 TABLET | Refills: 3 | Status: SHIPPED | OUTPATIENT
Start: 2020-10-21 | End: 2020-10-21

## 2020-10-21 RX ORDER — FUROSEMIDE 20 MG/1
20 TABLET ORAL EVERY OTHER DAY
Qty: 45 TABLET | Refills: 3 | Status: SHIPPED | OUTPATIENT
Start: 2020-10-21 | End: 2020-10-21

## 2020-10-21 RX ORDER — ASPIRIN 81 MG/1
81 TABLET ORAL DAILY
Qty: 90 TABLET | Refills: 3 | Status: SHIPPED | OUTPATIENT
Start: 2020-10-21 | End: 2020-10-21

## 2020-10-21 RX ORDER — ACETAMINOPHEN 500 MG
1 TABLET ORAL 2 TIMES DAILY
Qty: 180 TABLET | Refills: 0 | Status: SHIPPED | OUTPATIENT
Start: 2020-10-21 | End: 2020-11-20

## 2020-10-21 RX ORDER — EZETIMIBE 10 MG/1
10 TABLET ORAL DAILY
Qty: 90 TABLET | Refills: 3 | Status: SHIPPED | OUTPATIENT
Start: 2020-10-21 | End: 2020-10-21

## 2020-10-21 RX ORDER — ESCITALOPRAM OXALATE 5 MG/1
5 TABLET ORAL DAILY
Qty: 90 TABLET | Refills: 3 | Status: SHIPPED | OUTPATIENT
Start: 2020-10-21 | End: 2020-10-21

## 2020-10-21 RX ORDER — ASPIRIN 81 MG/1
81 TABLET ORAL DAILY
Qty: 90 TABLET | Refills: 0 | Status: SHIPPED | OUTPATIENT
Start: 2020-10-21 | End: 2021-01-15

## 2020-10-21 RX ORDER — FUROSEMIDE 20 MG/1
20 TABLET ORAL EVERY OTHER DAY
Qty: 45 TABLET | Refills: 0 | Status: SHIPPED | OUTPATIENT
Start: 2020-10-21 | End: 2021-02-11

## 2020-10-21 RX ORDER — ESCITALOPRAM OXALATE 5 MG/1
5 TABLET ORAL DAILY
Qty: 90 TABLET | Refills: 0 | Status: SHIPPED | OUTPATIENT
Start: 2020-10-21 | End: 2021-02-19

## 2020-10-21 RX ORDER — QUETIAPINE 25 MG/1
25 TABLET, FILM COATED ORAL EVERY EVENING
Qty: 90 TABLET | Refills: 0 | Status: SHIPPED | OUTPATIENT
Start: 2020-10-21 | End: 2021-01-19

## 2020-10-21 RX ORDER — ACETAMINOPHEN 500 MG
1 TABLET ORAL 2 TIMES DAILY
Qty: 180 TABLET | Refills: 3 | Status: SHIPPED | OUTPATIENT
Start: 2020-10-21 | End: 2020-10-21

## 2020-10-21 RX ORDER — CLOPIDOGREL BISULFATE 75 MG/1
75 TABLET ORAL DAILY
Qty: 90 TABLET | Refills: 0 | Status: SHIPPED | OUTPATIENT
Start: 2020-10-21 | End: 2021-02-15

## 2020-10-21 RX ORDER — EZETIMIBE 10 MG/1
10 TABLET ORAL DAILY
Qty: 90 TABLET | Refills: 0 | Status: SHIPPED | OUTPATIENT
Start: 2020-10-21 | End: 2021-02-15

## 2020-10-21 RX ORDER — QUETIAPINE 25 MG/1
25 TABLET, FILM COATED ORAL EVERY EVENING
Qty: 90 TABLET | Refills: 3 | Status: SHIPPED | OUTPATIENT
Start: 2020-10-21 | End: 2020-10-21

## 2020-10-21 NOTE — TELEPHONE ENCOUNTER
Pt's son Charles Teague calling back to follow up on refill requests. His mother is out of all of her meds and needs them sent to [facility] as soon as possible. Charles Teague can be reached at [phone] if needed.

## 2020-10-21 NOTE — TELEPHONE ENCOUNTER
Rx's sent to [facility] for long term fill and [facility] for short term fill. Patient's son Trenton Romero (ok per HIPAA) notified that Rx's were sent in and to call with any further issues.

## 2020-10-27 ENCOUNTER — MED REC SCAN ONLY (OUTPATIENT)
Dept: INTERNAL MEDICINE CLINIC | Facility: CLINIC | Age: 85
End: 2020-10-27

## 2020-11-15 ENCOUNTER — TELEPHONE (OUTPATIENT)
Dept: INTERNAL MEDICINE CLINIC | Facility: CLINIC | Age: 85
End: 2020-11-15

## 2020-11-15 NOTE — TELEPHONE ENCOUNTER
I filled out the prescription for rehab for Ms. Robles Running. Please kindly fax to [phone] for [facility].

## 2020-11-30 ENCOUNTER — TELEPHONE (OUTPATIENT)
Dept: INTERNAL MEDICINE CLINIC | Facility: CLINIC | Age: 85
End: 2020-11-30

## 2020-11-30 NOTE — TELEPHONE ENCOUNTER
Received a fax from [facility] of a form for Physical Therapy Outpatient Evaluation and Plan of Treatment via fax. Form placed in Dr Phillip Wilburn with barcode.    Fax requesting completed form to be faxed back to [phone]

## 2020-12-01 NOTE — TELEPHONE ENCOUNTER
Form faxed to [facility] at [phone], confirmation received. Copy placed in 1 week hold, original sent to scan.

## 2020-12-18 ENCOUNTER — TELEPHONE (OUTPATIENT)
Dept: INTERNAL MEDICINE CLINIC | Facility: CLINIC | Age: 85
End: 2020-12-18

## 2020-12-18 NOTE — TELEPHONE ENCOUNTER
Pt states she has difficulty walking even with walker. Hx of stroke. \"I feel sick, I don't have any pain anywhere, I just feel weak. \" No sob, no cp, no palpitations, no dizziness per pt.  Pt states she took Symbicort this morning and has not had to use al

## 2020-12-18 NOTE — TELEPHONE ENCOUNTER
Patient's son Sharyn Story is calling he just spoke to his mom and is very concerned    He would like to receive the call phone [phone]

## 2020-12-30 RX ORDER — QUETIAPINE 25 MG/1
TABLET, FILM COATED ORAL
Qty: 90 TABLET | Refills: 0 | OUTPATIENT
Start: 2020-12-30

## 2020-12-30 NOTE — TELEPHONE ENCOUNTER
Quetiapine was refilled in October 2020 for a short term refill to the local pharmacy and a one year supply to the mail order pharmacy per patient's family's request. Please see 10/14/20 refill encounter.

## 2021-01-05 NOTE — TELEPHONE ENCOUNTER
Azithromycin canceled per Dr. Lluvia Gallardo. New medication called in for Amoxicillin 500mg q 8 hours x 7 days #21.
LMTCB with son
Patients son called back - relayed DR. RAUSCH message and he verbalized understanding
Please notify patient's son, the bacteria that grew is not susceptible to the antibiotic I prescribed. I prescribed a new abx.
Living

## 2021-01-15 RX ORDER — ASPIRIN 81 MG/1
TABLET, COATED ORAL
Qty: 90 TABLET | Refills: 0 | Status: SHIPPED | OUTPATIENT
Start: 2021-01-15

## 2021-01-15 RX ORDER — FUROSEMIDE 20 MG/1
TABLET ORAL
Qty: 45 TABLET | Refills: 0 | OUTPATIENT
Start: 2021-01-15

## 2021-01-15 RX ORDER — ATORVASTATIN CALCIUM 40 MG/1
TABLET, FILM COATED ORAL
Qty: 90 TABLET | Refills: 0 | OUTPATIENT
Start: 2021-01-15

## 2021-01-15 RX ORDER — ESCITALOPRAM OXALATE 5 MG/1
TABLET ORAL
Qty: 90 TABLET | Refills: 0 | OUTPATIENT
Start: 2021-01-15

## 2021-01-15 NOTE — TELEPHONE ENCOUNTER
See 10/14/20 refill encounter. Current refill request refused due to refill is either a duplicate request or has active refills at the pharmacy. Check previous templates.     Requested Prescriptions     Signed Prescriptions Disp Refills   • ASPIRIN LOW

## 2021-01-18 NOTE — TELEPHONE ENCOUNTER
Yo Flores is calling to request a refill on the patient's sleeping pills. Patient is currently out of the medication.      Please send medication to [facility] in [place]

## 2021-01-19 RX ORDER — QUETIAPINE 25 MG/1
25 TABLET, FILM COATED ORAL EVERY EVENING
Qty: 30 TABLET | Refills: 0 | Status: SHIPPED | OUTPATIENT
Start: 2021-01-19 | End: 2021-02-11

## 2021-02-03 ENCOUNTER — TELEPHONE (OUTPATIENT)
Dept: INTERNAL MEDICINE CLINIC | Facility: CLINIC | Age: 86
End: 2021-02-03

## 2021-02-03 DIAGNOSIS — I73.9 PAD (PERIPHERAL ARTERY DISEASE) (HCC): Primary | ICD-10-CM

## 2021-02-03 DIAGNOSIS — M48.07 SPINAL STENOSIS OF LUMBOSACRAL REGION: ICD-10-CM

## 2021-02-03 DIAGNOSIS — Z23 NEED FOR VACCINATION: ICD-10-CM

## 2021-02-03 NOTE — TELEPHONE ENCOUNTER
Please call pt son, Lincoln Villalpando he can get Home Health assistance for his mother  She is having trouble with showering, cannot do by herself  Can PT, OT and speech therapy be ordered?   Please call Gabriella Medrano with any questions  Tasked to nursing

## 2021-02-03 NOTE — TELEPHONE ENCOUNTER
Okay to order home health, but home health will not initiate this without a face-to-face visit within 3 months

## 2021-02-03 NOTE — TELEPHONE ENCOUNTER
Spoke to patient's son, Narciso Moreno ([place] per HIPAA), who reports that patient is having difficulty showering, getting dressed and walking around. She is also having a hard time talking due to vascular dementia.  He confirms they [facility] used [facility] and would

## 2021-02-03 NOTE — TELEPHONE ENCOUNTER
Noted. Order entered. Patient has face to face scheduled for 2/11/21. Spoke to Jomar Restrepo and notified him order is placed but that services cannot be initiated until after appt on 2/11/21.  He verbalized understanding and also requests I fax order to [facility] who

## 2021-02-04 NOTE — TELEPHONE ENCOUNTER
[facility] calling back to get the most recent office notes from Dr Jarek Bolaños. They did receive the fax below but is it not what they needed.      Fax # [phone]  Ph # [phone], Soheila Carreno

## 2021-02-08 ENCOUNTER — TELEPHONE (OUTPATIENT)
Dept: INTERNAL MEDICINE CLINIC | Facility: CLINIC | Age: 86
End: 2021-02-08

## 2021-02-08 NOTE — TELEPHONE ENCOUNTER
Please advise - called son per betina colunga patient got second covid vaccine 2/3/21. She has a dry cough since Friday , denies fever  And feels exhausted .  Cough drops are not helping , she vomited once due to cough and he wants to know if she can take
Spoke to [employer], ok per hipaa, relayed Dr Galvan Longest message to him and he verbalized understanding.
Would suggest over-the-counter Robitussin-DM 5 cc every 4 hours as needed for cough--this probably will be more effective than cough drops.
Yancy Arnold (son) is calling his mom has had a cough for the past three days and cough drops aren't helping. Sat. She vomited once, and is experiencing exhaustion she received her second Almonte Peter vaccine Wednesday pt. Has an appt.  With Dr. Juan Diego Conte on 2/11  He wants to
PAST MEDICAL HISTORY:  Abdominal aortic aneurysm ' 2007    Anemia of chronic disease Iron infussions prn. Scheduled: 8-23-17 for Iron Infusion    Anxiety     Arthritis lower back    Atrial fibrillation chronic : since ' 2012    Basal cell carcinoma excised from nose x 2, b/l arms, and left thoracic, right temporal area    Benign prostatic hypertrophy     Bladder carcinoma s/p TURBT    CAD (Coronary Artery Disease)     Chronic combined systolic and diastolic congestive heart failure     Chronic Obstructive Pulmonary Disease (COPD)     Congestive heart failure Diastolic CHF    Depression     Diabetes     Gastrointestinal hemorrhage, unspecified gastrointestinal hemorrhage type     High Cholesterol     HLD (hyperlipidemia)     HTN (hypertension)     Incarcerated ventral hernia     Ischemic cardiomyopathy     Low back pain Chronic    Malignant melanoma, unspecified site     Melanoma of the back excised in the 80's    PAD (peripheral artery disease)     Retinal detachment, unspecified laterality     Spinal stenosis of lumbar region Right side    Spinal stenosis, unspecified spinal region     Stage 4 chronic kidney disease     Stented coronary artery RCA Stent    TIA (transient ischemic attack) 1990's    Transient cerebral ischemia, unspecified type remote    Transient ischemic attack (TIA)     Type 2 diabetes mellitus     Type 2 Diabetes Mellitus without (Mention Of) Complications

## 2021-02-11 ENCOUNTER — OFFICE VISIT (OUTPATIENT)
Dept: INTERNAL MEDICINE CLINIC | Facility: CLINIC | Age: 86
End: 2021-02-11
Payer: MEDICARE

## 2021-02-11 ENCOUNTER — LAB ENCOUNTER (OUTPATIENT)
Dept: LAB | Age: 86
End: 2021-02-11
Attending: INTERNAL MEDICINE
Payer: MEDICARE

## 2021-02-11 VITALS
BODY MASS INDEX: 28.52 KG/M2 | WEIGHT: 155 LBS | HEART RATE: 64 BPM | SYSTOLIC BLOOD PRESSURE: 128 MMHG | DIASTOLIC BLOOD PRESSURE: 68 MMHG | TEMPERATURE: 99 F | HEIGHT: 62 IN

## 2021-02-11 DIAGNOSIS — N28.9 ACUTE RENAL INSUFFICIENCY: ICD-10-CM

## 2021-02-11 DIAGNOSIS — J44.9 CHRONIC OBSTRUCTIVE PULMONARY DISEASE, UNSPECIFIED COPD TYPE (HCC): ICD-10-CM

## 2021-02-11 DIAGNOSIS — I10 ESSENTIAL HYPERTENSION: ICD-10-CM

## 2021-02-11 DIAGNOSIS — M48.07 SPINAL STENOSIS OF LUMBOSACRAL REGION: ICD-10-CM

## 2021-02-11 DIAGNOSIS — R53.1 WEAKNESS: ICD-10-CM

## 2021-02-11 DIAGNOSIS — Z00.00 PHYSICAL EXAM: Primary | ICD-10-CM

## 2021-02-11 DIAGNOSIS — R05.9 COUGH: ICD-10-CM

## 2021-02-11 DIAGNOSIS — I50.9 HEART FAILURE, UNSPECIFIED HF CHRONICITY, UNSPECIFIED HEART FAILURE TYPE (HCC): ICD-10-CM

## 2021-02-11 DIAGNOSIS — I73.9 PAD (PERIPHERAL ARTERY DISEASE) (HCC): ICD-10-CM

## 2021-02-11 DIAGNOSIS — E78.5 DYSLIPIDEMIA: ICD-10-CM

## 2021-02-11 DIAGNOSIS — I73.9 CLAUDICATION IN PERIPHERAL VASCULAR DISEASE (HCC): ICD-10-CM

## 2021-02-11 DIAGNOSIS — F03.90 DEMENTIA WITHOUT BEHAVIORAL DISTURBANCE, UNSPECIFIED DEMENTIA TYPE (HCC): ICD-10-CM

## 2021-02-11 DIAGNOSIS — N28.89 LEFT RENAL MASS: ICD-10-CM

## 2021-02-11 DIAGNOSIS — Z00.00 PHYSICAL EXAM: ICD-10-CM

## 2021-02-11 DIAGNOSIS — J44.1 CHRONIC OBSTRUCTIVE PULMONARY DISEASE WITH (ACUTE) EXACERBATION (HCC): ICD-10-CM

## 2021-02-11 LAB
ALBUMIN SERPL-MCNC: 3.6 G/DL (ref 3.4–5)
ALBUMIN/GLOB SERPL: 1.1 {RATIO} (ref 1–2)
ALP LIVER SERPL-CCNC: 74 U/L
ALT SERPL-CCNC: 17 U/L
ANION GAP SERPL CALC-SCNC: 5 MMOL/L (ref 0–18)
AST SERPL-CCNC: 19 U/L (ref 15–37)
BASOPHILS # BLD AUTO: 0.07 X10(3) UL (ref 0–0.2)
BASOPHILS NFR BLD AUTO: 0.9 %
BILIRUB SERPL-MCNC: 0.6 MG/DL (ref 0.1–2)
BUN BLD-MCNC: 17 MG/DL (ref 7–18)
BUN/CREAT SERPL: 17.7 (ref 10–20)
CALCIUM BLD-MCNC: 9.9 MG/DL (ref 8.5–10.1)
CHLORIDE SERPL-SCNC: 109 MMOL/L (ref 98–112)
CHOLEST SMN-MCNC: 299 MG/DL (ref ?–200)
CO2 SERPL-SCNC: 29 MMOL/L (ref 21–32)
CREAT BLD-MCNC: 0.96 MG/DL
DEPRECATED RDW RBC AUTO: 49.6 FL (ref 35.1–46.3)
EOSINOPHIL # BLD AUTO: 0.34 X10(3) UL (ref 0–0.7)
EOSINOPHIL NFR BLD AUTO: 4.5 %
ERYTHROCYTE [DISTWIDTH] IN BLOOD BY AUTOMATED COUNT: 13.4 % (ref 11–15)
GLOBULIN PLAS-MCNC: 3.3 G/DL (ref 2.8–4.4)
GLUCOSE BLD-MCNC: 85 MG/DL (ref 70–99)
HCT VFR BLD AUTO: 48.3 %
HDLC SERPL-MCNC: 91 MG/DL (ref 40–59)
HGB BLD-MCNC: 15.3 G/DL
IMM GRANULOCYTES # BLD AUTO: 0.04 X10(3) UL (ref 0–1)
IMM GRANULOCYTES NFR BLD: 0.5 %
LDLC SERPL CALC-MCNC: 182 MG/DL (ref ?–100)
LYMPHOCYTES # BLD AUTO: 1.46 X10(3) UL (ref 1–4)
LYMPHOCYTES NFR BLD AUTO: 19.4 %
M PROTEIN MFR SERPL ELPH: 6.9 G/DL (ref 6.4–8.2)
MCH RBC QN AUTO: 31.4 PG (ref 26–34)
MCHC RBC AUTO-ENTMCNC: 31.7 G/DL (ref 31–37)
MCV RBC AUTO: 99 FL
MONOCYTES # BLD AUTO: 0.55 X10(3) UL (ref 0.1–1)
MONOCYTES NFR BLD AUTO: 7.3 %
NEUTROPHILS # BLD AUTO: 5.07 X10 (3) UL (ref 1.5–7.7)
NEUTROPHILS # BLD AUTO: 5.07 X10(3) UL (ref 1.5–7.7)
NEUTROPHILS NFR BLD AUTO: 67.4 %
NONHDLC SERPL-MCNC: 208 MG/DL (ref ?–130)
NT-PROBNP SERPL-MCNC: 379 PG/ML (ref ?–450)
OSMOLALITY SERPL CALC.SUM OF ELEC: 297 MOSM/KG (ref 275–295)
PATIENT FASTING Y/N/NP: NO
PATIENT FASTING Y/N/NP: NO
PLATELET # BLD AUTO: 153 10(3)UL (ref 150–450)
POTASSIUM SERPL-SCNC: 5.3 MMOL/L (ref 3.5–5.1)
RBC # BLD AUTO: 4.88 X10(6)UL
SODIUM SERPL-SCNC: 143 MMOL/L (ref 136–145)
TRIGL SERPL-MCNC: 130 MG/DL (ref 30–149)
TSI SER-ACNC: 1.24 MIU/ML (ref 0.36–3.74)
VLDLC SERPL CALC-MCNC: 26 MG/DL (ref 0–30)
WBC # BLD AUTO: 7.5 X10(3) UL (ref 4–11)

## 2021-02-11 PROCEDURE — 80061 LIPID PANEL: CPT

## 2021-02-11 PROCEDURE — 80053 COMPREHEN METABOLIC PANEL: CPT

## 2021-02-11 PROCEDURE — 85025 COMPLETE CBC W/AUTO DIFF WBC: CPT

## 2021-02-11 PROCEDURE — 84443 ASSAY THYROID STIM HORMONE: CPT

## 2021-02-11 PROCEDURE — 99213 OFFICE O/P EST LOW 20 MIN: CPT | Performed by: INTERNAL MEDICINE

## 2021-02-11 PROCEDURE — 36415 COLL VENOUS BLD VENIPUNCTURE: CPT

## 2021-02-11 PROCEDURE — G0439 PPPS, SUBSEQ VISIT: HCPCS | Performed by: INTERNAL MEDICINE

## 2021-02-11 PROCEDURE — 83880 ASSAY OF NATRIURETIC PEPTIDE: CPT

## 2021-02-11 RX ORDER — FUROSEMIDE 20 MG/1
20 TABLET ORAL DAILY
Qty: 90 TABLET | Refills: 3 | Status: SHIPPED | OUTPATIENT
Start: 2021-02-11 | End: 2021-02-11

## 2021-02-11 RX ORDER — QUETIAPINE 25 MG/1
25 TABLET, FILM COATED ORAL EVERY EVENING
Qty: 30 TABLET | Refills: 3 | Status: SHIPPED | OUTPATIENT
Start: 2021-02-11 | End: 2021-03-19

## 2021-02-11 RX ORDER — FUROSEMIDE 20 MG/1
20 TABLET ORAL DAILY
Qty: 90 TABLET | Refills: 3 | Status: SHIPPED | OUTPATIENT
Start: 2021-02-11 | End: 2021-06-09

## 2021-02-11 NOTE — PROGRESS NOTES
Mickie Napoles is a 80year old female who presents for a Medicare Annual Wellness visit. Notes continued cough x 2wks. Denies any fevers or chills. Cough is nonproductive.   Sometimes associated with shortness of breath and at times notes d current health state?: Poor    How do you maintain positive mental well-being?: Visiting Family    If you are a male age 38-65 or a female age 47-67, do you take aspirin?: No    Have you had any immunizations at another office such as Influenza, Hepatitis month is it?: Incorrect    What year is it?: Correct    Recall \"Ball\": Correct    Recall \"Flag\": Correct    Recall \"Tree\":  Incorrect              PREVENTATIVE SERVICES  INDICATIONS AND SCHEDULE Internal Lab or Procedure External Lab or Procedure   Clemencia Miller Immunization Activity if applicable    Hepatitis B No orders found for this or any previous visit. Update Immunization Activity if applicable    Tetanus No orders found for this or any previous visit.  Update Immunization Activity if applicable    Zoster (N 90 tablet 0   • Clopidogrel Bisulfate 75 MG Oral Tab Take 1 tablet (75 mg total) by mouth daily. 90 tablet 0   • atorvastatin 40 MG Oral Tab Take 1 tablet (40 mg total) by mouth once daily.  90 tablet 0   • ipratropium-albuterol 0.5-2.5 (3) MG/3ML Inhalatio breast   • Colon Cancer Sister 68      SOCIAL HISTORY:   Social History    Tobacco Use      Smoking status: Former Smoker        Quit date: 3/1/2014        Years since quittin.9      Smokeless tobacco: Never Used    Alcohol use: Yes      Frequency: Mon Eye Chart Acuity: 20/40   NECK: supple, no adenopathy, no bruits, no thyromegaly  CHEST: no chest tenderness  BREAST: Breast exam not done.   LUNGS: Few fine crackles noted in right base without any wheezing or rhonchi  CARDIO: RRR without murmur, S1, S2  G has ongoing cough for at least 3 to 4 weeks. She has no fevers or sputum production. Infectious etiology unlikely. Suggested COVID-19 testing, but patient would like to wait. Also suggested chest x-ray--again patient would like to wait.   She is not not indicates understanding of these issues and agrees to the plan.   We will arrange follow-up pending lab test results    SUGGESTED VACCINATIONS - Influenza, Pneumococcal, Zoster, Tetanus     Immunization History   Administered Date(s) Administered   • Covid-

## 2021-02-12 ENCOUNTER — TELEPHONE (OUTPATIENT)
Dept: INTERNAL MEDICINE CLINIC | Facility: CLINIC | Age: 86
End: 2021-02-12

## 2021-02-12 DIAGNOSIS — I51.89 DIASTOLIC DYSFUNCTION: Primary | ICD-10-CM

## 2021-02-12 NOTE — TELEPHONE ENCOUNTER
[address] [place] [address] is calling to request the most recent office notes and referral, please fax all to the fax number below.      Fax # [phone]

## 2021-02-12 NOTE — TELEPHONE ENCOUNTER
I spoke with patient's son, Cortney Glez per HIPPA and relayed Dr. Ritu Lutz message. He verbalized understanding. I explained to Justin Nolan that the number he gave me sounded like someone's personal extension.  He provided me with a phone number for [facility]

## 2021-02-12 NOTE — TELEPHONE ENCOUNTER
Left message for [facility]. Dr. Socorro Madsen home health order is written to [facility]. Does the order need to be written to [facility]? kurt Urban to send the last office note to [facility]?

## 2021-02-12 NOTE — TELEPHONE ENCOUNTER
Spoke to patient and relayed MD message, patient verbalized understanding. Patient reports that she is taking her prescriptions as prescribed consistently. She requests I also call her son Gopi Johnson with this information.      Spoke with patient's son, Gopi Johnson (OK pe

## 2021-02-12 NOTE — TELEPHONE ENCOUNTER
Noted. Thank you. Order and 2/11/21 note faxed to [facility] at Fax # [phone]. Await fax confirmation.

## 2021-02-12 NOTE — TELEPHONE ENCOUNTER
I spoke with patient's son, Cortney Sanchez per Balaji. I relayed Dr. Ignacio Gonzalez message and he verbalized understanding. He says that they do have a pill box set up for her.  He says that the patient lives at [facility] and they have a home health company called [facility]

## 2021-02-12 NOTE — TELEPHONE ENCOUNTER
Okay, I have placed order for [facility]. At yesterday's visit I gave external orders for physical therapy and Occupational Therapy. We will continue same medications--hopefully visiting nurse can oversee patient's medication.   I also discus

## 2021-02-12 NOTE — TELEPHONE ENCOUNTER
Ludivina Flores from [facility] returned our call. Order does need to be written out to [facility].

## 2021-02-12 NOTE — TELEPHONE ENCOUNTER
I spoke with Jannet Torres at [facility] (phone [phone]). She asked for a home health order with referral to [facility] be faxed to her at Fax # [phone]. She would like the last office note as well.  Referral to [facility] is

## 2021-02-12 NOTE — TELEPHONE ENCOUNTER
----- Message from Deborah Bergeron MD sent at 2/12/2021  8:07 AM CST -----  BNP is improved and is now normal.  Kidney function reveals normal creatinine.   Therefore, lets continue with increasing furosemide dose to 20 mg daily as discussed at yesterday's

## 2021-02-15 RX ORDER — EZETIMIBE 10 MG/1
TABLET ORAL
Qty: 90 TABLET | Refills: 3 | Status: SHIPPED | OUTPATIENT
Start: 2021-02-15

## 2021-02-15 RX ORDER — CLOPIDOGREL BISULFATE 75 MG/1
TABLET ORAL
Qty: 90 TABLET | Refills: 3 | Status: SHIPPED | OUTPATIENT
Start: 2021-02-15

## 2021-02-16 ENCOUNTER — TELEPHONE (OUTPATIENT)
Dept: INTERNAL MEDICINE CLINIC | Facility: CLINIC | Age: 86
End: 2021-02-16

## 2021-02-16 RX ORDER — ATORVASTATIN CALCIUM 40 MG/1
TABLET, FILM COATED ORAL
Qty: 90 TABLET | Refills: 3 | Status: SHIPPED | OUTPATIENT
Start: 2021-02-16

## 2021-02-16 NOTE — TELEPHONE ENCOUNTER
Maritza Reyes from  [facility]. She did a start of care for this pt. Today. She is confirming that Dr. Ghanshyam Chase will be signing the home care orders? Maritza Reyes can be reached at [phone].

## 2021-02-18 ENCOUNTER — TELEPHONE (OUTPATIENT)
Dept: INTERNAL MEDICINE CLINIC | Facility: CLINIC | Age: 86
End: 2021-02-18

## 2021-02-18 RX ORDER — BUDESONIDE AND FORMOTEROL FUMARATE DIHYDRATE 160; 4.5 UG/1; UG/1
2 AEROSOL RESPIRATORY (INHALATION) 2 TIMES DAILY
Qty: 3 INHALER | Refills: 2 | Status: SHIPPED | OUTPATIENT
Start: 2021-02-18

## 2021-02-18 NOTE — TELEPHONE ENCOUNTER
To Dr. Bertin Quintana - per Angeles Cannon. Pt wheezing was helped with Albuterol. Needs maintenance inhaler refill which was done. Verified pharmacy with Angeles Cannon. Pt does not want to take Lexapro - does not feel that she needs it.   Likes Seroquel to help her sleep but do

## 2021-02-18 NOTE — TELEPHONE ENCOUNTER
Please call [facility]  Pt was admitted to [address] she has some medication questions, some discrepancies  Tasked to nursing

## 2021-02-19 NOTE — TELEPHONE ENCOUNTER
Spoke with Leonard Perla from [facility] and relayed MD message, she verbalizes understanding. She will tell patient when she sees her later on today.  Lexapro removed from med list.     Attila Cantu-- just wanted to confirm for dose of 5mg lexapro no need to wean off

## 2021-03-08 ENCOUNTER — TELEPHONE (OUTPATIENT)
Dept: INTERNAL MEDICINE CLINIC | Facility: CLINIC | Age: 86
End: 2021-03-08

## 2021-03-18 ENCOUNTER — TELEPHONE (OUTPATIENT)
Dept: INTERNAL MEDICINE CLINIC | Facility: CLINIC | Age: 86
End: 2021-03-18

## 2021-03-18 NOTE — TELEPHONE ENCOUNTER
To Dr. Bandar Haley - see message below. OK to send pended med to [facility]?  Last fill 2/11/21 to [address]. Compression stockings?

## 2021-03-18 NOTE — TELEPHONE ENCOUNTER
Please call [facility]    Requesting refill for:  Quetiapine  Pt is low on medication   [facility] [place] [facility]    Pt would like to start wearing compression stockings. Is this ok?   Please call to advise      Tasked to nursing

## 2021-03-19 RX ORDER — QUETIAPINE 25 MG/1
25 TABLET, FILM COATED ORAL EVERY EVENING
Qty: 30 TABLET | Refills: 3 | Status: SHIPPED | OUTPATIENT
Start: 2021-03-19

## 2021-03-29 ENCOUNTER — TELEPHONE (OUTPATIENT)
Dept: INTERNAL MEDICINE CLINIC | Facility: CLINIC | Age: 86
End: 2021-03-29

## 2021-03-29 NOTE — TELEPHONE ENCOUNTER
Pt complaining of onset of dizziness, started yesterday  Vital signs are stable  /70  Heart rate 90  Pulse ox 94%  Reviewed medications  Dizziness is quite severe per pt  Can blood work be ordered?   Tasked to nursing

## 2021-03-29 NOTE — TELEPHONE ENCOUNTER
The cause of the symptoms are not readily clear to me. Perhaps patient is having positionally related changes secondary to inner ear disturbance such as labyrinthitis or benign positional vertigo.   We could try small dose of Antivert 12.5 mg 3 times daily

## 2021-03-29 NOTE — TELEPHONE ENCOUNTER
Spoke with Andrei Cornell who is currently with the patient. Dizziness started yesterday. Yesterday dizziness was much worse, was \"frightening\", almost called son to take her to ER, but today it has improved.  No falls or head trauma, no headaches/pain or blurred

## 2021-03-29 NOTE — TELEPHONE ENCOUNTER
Spoke to [facility] and relayed MD message. Kvng Vinson verbalized understanding and agrees with plan.

## 2021-04-06 NOTE — TELEPHONE ENCOUNTER
Reviewed Dr. Socorro Madsen message below with pt's son Ellena Kawasaki who verbalized understanding.

## 2021-04-06 NOTE — TELEPHONE ENCOUNTER
Pts son Berenice Antonio called looking for medication for his moms dizziness. Stated medication should have been sent to [facility] and its not there.     Please advise

## 2021-04-08 ENCOUNTER — TELEPHONE (OUTPATIENT)
Dept: INTERNAL MEDICINE CLINIC | Facility: CLINIC | Age: 86
End: 2021-04-08

## 2021-04-08 ENCOUNTER — TELEPHONE (OUTPATIENT)
Dept: CARDIOLOGY | Age: 86
End: 2021-04-08

## 2021-04-08 NOTE — TELEPHONE ENCOUNTER
Please call [facility]    Pt has been having increasing symptoms of depression  Pt would like to restart medication, was taking Lexapro 5Mg, can a higher dose be ordered? Pt started having left lower leg/shin pain. Vascular surgeon has not seen pt since 2019 so cannot do much  Can ultra sound be ordered?     Tasked to nursing

## 2021-04-08 NOTE — TELEPHONE ENCOUNTER
Dr. Mariann Montenegro message relayed to [facility] who verbalized understanding. She will contact pt's son as transportation is an issue. She will have him call to schedule appt.

## 2021-04-08 NOTE — TELEPHONE ENCOUNTER
To Dr. Wellington Rollins - see [facility] message below,  See 2/18/21 encounter when [facility] was discontinued, pt felt she no longer needed.

## 2021-04-14 ENCOUNTER — OFFICE VISIT (OUTPATIENT)
Dept: INTERNAL MEDICINE CLINIC | Facility: CLINIC | Age: 86
End: 2021-04-14
Payer: MEDICARE

## 2021-04-14 VITALS
WEIGHT: 155 LBS | BODY MASS INDEX: 28.52 KG/M2 | TEMPERATURE: 98 F | DIASTOLIC BLOOD PRESSURE: 72 MMHG | SYSTOLIC BLOOD PRESSURE: 112 MMHG | HEIGHT: 62 IN | HEART RATE: 82 BPM | OXYGEN SATURATION: 95 %

## 2021-04-14 DIAGNOSIS — I10 ESSENTIAL HYPERTENSION: ICD-10-CM

## 2021-04-14 DIAGNOSIS — N28.89 LEFT RENAL MASS: ICD-10-CM

## 2021-04-14 DIAGNOSIS — I51.89 DIASTOLIC DYSFUNCTION: ICD-10-CM

## 2021-04-14 DIAGNOSIS — F03.90 DEMENTIA WITHOUT BEHAVIORAL DISTURBANCE, UNSPECIFIED DEMENTIA TYPE (HCC): ICD-10-CM

## 2021-04-14 DIAGNOSIS — J44.9 CHRONIC OBSTRUCTIVE PULMONARY DISEASE, UNSPECIFIED COPD TYPE (HCC): ICD-10-CM

## 2021-04-14 DIAGNOSIS — I73.9 PAD (PERIPHERAL ARTERY DISEASE) (HCC): Primary | ICD-10-CM

## 2021-04-14 DIAGNOSIS — E78.5 DYSLIPIDEMIA: ICD-10-CM

## 2021-04-14 DIAGNOSIS — M48.07 SPINAL STENOSIS OF LUMBOSACRAL REGION: ICD-10-CM

## 2021-04-14 DIAGNOSIS — H81.10 BENIGN PAROXYSMAL POSITIONAL VERTIGO, UNSPECIFIED LATERALITY: ICD-10-CM

## 2021-04-14 PROCEDURE — 99214 OFFICE O/P EST MOD 30 MIN: CPT | Performed by: INTERNAL MEDICINE

## 2021-04-14 RX ORDER — ASPIRIN 81 MG/1
TABLET ORAL
Qty: 90 TABLET | Refills: 0 | OUTPATIENT
Start: 2021-04-14

## 2021-04-14 RX ORDER — CILOSTAZOL 100 MG/1
100 TABLET ORAL 2 TIMES DAILY
Qty: 60 TABLET | Refills: 6 | Status: SHIPPED | OUTPATIENT
Start: 2021-04-14

## 2021-04-14 NOTE — PROGRESS NOTES
HPI:    Patient ID: Ana Paula Hernández is a 80year old female. Presents with c/o increasing sensitivity lower legs bilaterally. Patient has known history of peripheral arterial disease.     She has seen vascular surgeon in the past who recomme Tab TAKE 1 TABLET BY MOUTH EVERY DAY 90 tablet 3   • CLOPIDOGREL BISULFATE 75 MG Oral Tab TAKE 1 TABLET BY MOUTH EVERY DAY 90 tablet 3   • furosemide 20 MG Oral Tab Take 1 tablet (20 mg total) by mouth daily.  90 tablet 3   • ASPIRIN LOW DOSE 81 MG Oral Tab Mood and Affect: Mood normal.                ASSESSMENT/PLAN:   Pad (peripheral artery disease) ([facility])  (primary encounter diagnosis)  Essential hypertension  Diastolic dysfunction  Chronic obstructive pulmonary disease, unspecified copd type ([facility])  Yaron Burns maintenance inhaler of budesonide/formoterol for management of COPD which has been under stable control. Continue atorvastatin for management of hyperlipidemia    Patient may be having symptoms of benign positional vertigo versus labyrinthitis.   She [place]

## 2021-04-14 NOTE — TELEPHONE ENCOUNTER
Per records, 1 year supply sent to [facility] 10/2020. Current refill request refused due to refill is either a duplicate request or has active refills at the pharmacy. Check previous templates.     Requested Prescriptions     Refused Prescriptions D

## 2021-04-15 ENCOUNTER — MED REC SCAN ONLY (OUTPATIENT)
Dept: INTERNAL MEDICINE CLINIC | Facility: CLINIC | Age: 86
End: 2021-04-15

## 2021-04-15 ENCOUNTER — TELEPHONE (OUTPATIENT)
Dept: INTERNAL MEDICINE CLINIC | Facility: CLINIC | Age: 86
End: 2021-04-15

## 2021-04-15 NOTE — TELEPHONE ENCOUNTER
Spoke with [facility] re: Cilostazol 100mg BID - this was relayed to Blake who verbalized understanding. Verbal order,pre protocol, to re-certify HH for pt, understanding verbalized.

## 2021-04-15 NOTE — TELEPHONE ENCOUNTER
Please call [facility] with new medication prescribed by Dr Soy Wynn to re-certify patient for Home Health?     Tasked to nursing

## 2021-04-18 ENCOUNTER — HOSPITAL ENCOUNTER (EMERGENCY)
Facility: HOSPITAL | Age: 86
Discharge: HOME | End: 2021-04-18
Attending: EMERGENCY MEDICINE
Payer: MEDICARE

## 2021-04-18 ENCOUNTER — APPOINTMENT (OUTPATIENT)
Dept: GENERAL RADIOLOGY | Facility: HOSPITAL | Age: 86
End: 2021-04-18
Attending: EMERGENCY MEDICINE
Payer: MEDICARE

## 2021-04-18 VITALS
HEIGHT: 62 IN | OXYGEN SATURATION: 96 % | RESPIRATION RATE: 18 BRPM | DIASTOLIC BLOOD PRESSURE: 60 MMHG | BODY MASS INDEX: 27.6 KG/M2 | TEMPERATURE: 97 F | SYSTOLIC BLOOD PRESSURE: 118 MMHG | HEART RATE: 71 BPM | WEIGHT: 150 LBS

## 2021-04-18 DIAGNOSIS — M54.50 ACUTE LOW BACK PAIN WITHOUT SCIATICA, UNSPECIFIED BACK PAIN LATERALITY: Primary | ICD-10-CM

## 2021-04-18 PROCEDURE — 72100 X-RAY EXAM L-S SPINE 2/3 VWS: CPT | Performed by: EMERGENCY MEDICINE

## 2021-04-18 PROCEDURE — 99285 EMERGENCY DEPT VISIT HI MDM: CPT

## 2021-04-18 RX ORDER — ACETAMINOPHEN 500 MG
1000 TABLET ORAL ONCE
Status: COMPLETED | OUTPATIENT
Start: 2021-04-18 | End: 2021-04-18

## 2021-04-18 NOTE — CM/SW NOTE
Call received from Dr. Nikky Basurto patient requesting Rehab placement. Referral sent to [facility] patient currently a resident at [address] [place] [address].

## 2021-04-18 NOTE — ED INITIAL ASSESSMENT (HPI)
Pt came in via EMS for atraumatic lower back pain for a week. Hx of kidney cancer. RR even and nonlabored, speaking in full sentences. Denies falls, numbness/tingling.

## 2021-04-18 NOTE — CM/SW NOTE
[facility] called to inform them of referral- [phone]  VM left with Shonna in Admissions.

## 2021-04-18 NOTE — CM/SW NOTE
Charge Dontae Nurse paged at [address]- to discuss placement options. Calling-nursing director for bed.

## 2021-04-18 NOTE — CM/SW NOTE
Charles Teague patient's son called at [phone]- updated that other facilities are reviewing her and he is ok with alternate placement.

## 2021-04-19 NOTE — ED QUICK NOTES
Discharge instructions reviewed with pt. Pt denies any further questions at this time. Pt stable at time of transfer to nursing home. [facility] at bedside. A copy of the chart was sent with the pt.  Also medication list faxed to rehab facility,

## 2021-04-19 NOTE — CM/SW NOTE
Albuterol Sulfate HFA (PROAIR HFA) 108 (90 Base) MCG/ACT Inhalation Aero Soln  Inhale 2 puffs into the lungs every 4 (four) hours as needed for Wheezing., Normal, Disp-1 Inhaler, R-6 Last Dose: Not Recorded  OrderDon't OrderReplaceDiscontinue  Refills:0 or Tab  TAKE 1 TABLET BY MOUTH EVERY DAY, Normal, Disp-90 tablet, R-3 Last Dose: Not Recorded  OrderDon't OrderReplaceDiscontinue  Refills:3 ordered  Pharmacy:[facility]/pharmacy #[id number]- [place], [place] - [address], [phone], [phone]

## 2021-04-19 NOTE — CM/SW NOTE
Patient speaks with son Trenton Nick and now agreeable to [place] in [place]:  [address], [address] [facility].

## 2021-04-19 NOTE — ED PROVIDER NOTES
Patient Seen in: [facility] AND [facility] Emergency Department    History   Patient presents with:  Back Pain    Stated Complaint:     HPI    Chief complaint: Back pain    History of present illness:   The patient complains of back pain, that began quite some time decompressive laminectomy with bilat foraminotomies. Dr. Harmony Rodriguez   • [address] [facility]     • TONSILLECTOMY         Medications :   cilostazol 100 MG Oral Tab,  Take 1 tablet (100 mg total) by mouth 2 (two) times daily.    Diane Ocampo elements reviewed from today and agreed except as otherwise stated in HPI.     Physical Exam     ED Triage Vitals [04/18/21 1337]   /67   Pulse 94   Resp 16   Temp 97.3 °F (36.3 °C)   Temp src Oral   SpO2 95 %   O2 Device None (Room air)       Current Prescribed:  Current Discharge Medication List        Present on Admission:  **None**

## 2021-04-19 NOTE — CM/SW NOTE
Patient ok for 9:00PM arrival time- RN to call report to [phone] to [facility] in [place].      S ambulance called ETA: 8:30PM

## 2021-04-19 NOTE — ED QUICK NOTES
Report given to Benja Garcia RN from [facility]. [place] requesting medication list to be faxed to [phone].

## 2021-04-19 NOTE — CM/SW NOTE
[address] [place] [address] called [phone] patient now requesting to return to [place] with caregiver. Called to discuss with Nursing staff.

## 2021-04-21 ENCOUNTER — EXTERNAL FACILITY (OUTPATIENT)
Dept: INTERNAL MEDICINE CLINIC | Facility: CLINIC | Age: 86
End: 2021-04-21

## 2021-04-21 DIAGNOSIS — G89.29 CHRONIC LOW BACK PAIN, UNSPECIFIED BACK PAIN LATERALITY, UNSPECIFIED WHETHER SCIATICA PRESENT: ICD-10-CM

## 2021-04-21 DIAGNOSIS — M54.50 CHRONIC LOW BACK PAIN, UNSPECIFIED BACK PAIN LATERALITY, UNSPECIFIED WHETHER SCIATICA PRESENT: ICD-10-CM

## 2021-04-21 DIAGNOSIS — I10 ESSENTIAL HYPERTENSION: ICD-10-CM

## 2021-04-21 PROCEDURE — 99305 1ST NF CARE MODERATE MDM 35: CPT | Performed by: INTERNAL MEDICINE

## 2021-04-25 ENCOUNTER — EXTERNAL FACILITY (OUTPATIENT)
Dept: INTERNAL MEDICINE CLINIC | Facility: CLINIC | Age: 86
End: 2021-04-25

## 2021-04-25 DIAGNOSIS — M54.50 CHRONIC LOW BACK PAIN WITHOUT SCIATICA, UNSPECIFIED BACK PAIN LATERALITY: ICD-10-CM

## 2021-04-25 DIAGNOSIS — J44.9 CHRONIC OBSTRUCTIVE PULMONARY DISEASE, UNSPECIFIED COPD TYPE (HCC): ICD-10-CM

## 2021-04-25 DIAGNOSIS — G89.29 CHRONIC LOW BACK PAIN WITHOUT SCIATICA, UNSPECIFIED BACK PAIN LATERALITY: ICD-10-CM

## 2021-04-25 DIAGNOSIS — I10 ESSENTIAL HYPERTENSION: ICD-10-CM

## 2021-04-25 PROCEDURE — 99308 SBSQ NF CARE LOW MDM 20: CPT | Performed by: INTERNAL MEDICINE

## 2021-04-25 NOTE — PROGRESS NOTES
Admission note   date of service April 21, 2021  Type in person  Location [facility]     80year-old lady with a past medical history of hypertension, COPD, dementia, spinal stenosis, dyslipidemia, peripheral arterial disease status post PTA organomegaly.   EXTREMITIES – no edema BL   NEUROLOGIC: Alert orient to 3, no motor deficits, no sensory deficits  PSYCHIATRIC: Affect appropriate, calm and cooperative     Assessment and plan    Back pain-most likely musculoskeletal.  Optimize pain managem

## 2021-04-25 NOTE — TELEPHONE ENCOUNTER
Celso [facility] :    Discharge planning, rehab requiring, depressed added to problem list.     Best call back: [phone]
Noted.
To Dr. William Castillo
normal...

## 2021-04-26 ENCOUNTER — MED REC SCAN ONLY (OUTPATIENT)
Dept: INTERNAL MEDICINE CLINIC | Facility: CLINIC | Age: 86
End: 2021-04-26

## 2021-04-28 ENCOUNTER — EXTERNAL FACILITY (OUTPATIENT)
Dept: INTERNAL MEDICINE CLINIC | Facility: CLINIC | Age: 86
End: 2021-04-28

## 2021-04-28 DIAGNOSIS — M79.671 PAIN OF BOTH HEELS: ICD-10-CM

## 2021-04-28 DIAGNOSIS — M48.07 SPINAL STENOSIS OF LUMBOSACRAL REGION: ICD-10-CM

## 2021-04-28 DIAGNOSIS — M79.672 PAIN OF BOTH HEELS: ICD-10-CM

## 2021-04-28 DIAGNOSIS — E55.9 VITAMIN D DEFICIENCY: ICD-10-CM

## 2021-04-28 PROCEDURE — 99309 SBSQ NF CARE MODERATE MDM 30: CPT | Performed by: INTERNAL MEDICINE

## 2021-04-29 NOTE — PROGRESS NOTES
Location [facility]  Type in person  Date of service 4/28/2021     She is progressing well with physical therapy. She reports that she has heel pain bilaterally more on the right side. She denies any trauma or fall.   She attributes this fro

## 2021-04-29 NOTE — PROGRESS NOTES
Location [facility]  Type in person  Date of service 4/25/2021     Patient seen and examined. She reports her back pain is better. However it gets worse with activity. Denies any chest pain or shortness of breath.       PHYSICAL EXAM:  Sita

## 2021-05-02 ENCOUNTER — EXTERNAL FACILITY (OUTPATIENT)
Dept: INTERNAL MEDICINE CLINIC | Facility: CLINIC | Age: 86
End: 2021-05-02

## 2021-05-02 DIAGNOSIS — G89.29 CHRONIC MIDLINE LOW BACK PAIN, UNSPECIFIED WHETHER SCIATICA PRESENT: ICD-10-CM

## 2021-05-02 DIAGNOSIS — M54.50 CHRONIC MIDLINE LOW BACK PAIN, UNSPECIFIED WHETHER SCIATICA PRESENT: ICD-10-CM

## 2021-05-02 DIAGNOSIS — I10 ESSENTIAL HYPERTENSION: ICD-10-CM

## 2021-05-02 DIAGNOSIS — M48.07 SPINAL STENOSIS OF LUMBOSACRAL REGION: ICD-10-CM

## 2021-05-02 PROCEDURE — 99308 SBSQ NF CARE LOW MDM 20: CPT | Performed by: INTERNAL MEDICINE

## 2021-05-05 ENCOUNTER — EXTERNAL FACILITY (OUTPATIENT)
Dept: INTERNAL MEDICINE CLINIC | Facility: CLINIC | Age: 86
End: 2021-05-05

## 2021-05-05 ENCOUNTER — TELEPHONE (OUTPATIENT)
Dept: INTERNAL MEDICINE CLINIC | Facility: CLINIC | Age: 86
End: 2021-05-05

## 2021-05-05 DIAGNOSIS — I10 ESSENTIAL HYPERTENSION: ICD-10-CM

## 2021-05-05 DIAGNOSIS — G89.29 CHRONIC LOW BACK PAIN, UNSPECIFIED BACK PAIN LATERALITY, UNSPECIFIED WHETHER SCIATICA PRESENT: ICD-10-CM

## 2021-05-05 DIAGNOSIS — M54.50 CHRONIC LOW BACK PAIN, UNSPECIFIED BACK PAIN LATERALITY, UNSPECIFIED WHETHER SCIATICA PRESENT: ICD-10-CM

## 2021-05-05 PROCEDURE — 99308 SBSQ NF CARE LOW MDM 20: CPT | Performed by: INTERNAL MEDICINE

## 2021-05-05 NOTE — TELEPHONE ENCOUNTER
Pt son returned call to Dr Jesi Mckeon  Please call Ellena Kawasaki at [phone]  Tasked to Dr Jesi Mckeon

## 2021-05-06 NOTE — PROGRESS NOTES
Location [facility]  Type in person  Date of service 5/5/2021     no complaints   happy with progress   Looking forward to going home soon  back pain better   no heel pain   labs reviewed and discussed .       PHYSICAL EXAM:  Vitals reviewed a

## 2021-05-06 NOTE — PROGRESS NOTES
Location [facility]  Type in person  Date of service 5/2/2021     Normal heel pain. Back pain is much better. Participating the physical therapy. Progressing well. No falls reported.       PHYSICAL EXAM:  Vitals reviewed and stable  GENERA

## 2021-05-07 ENCOUNTER — EXTERNAL FACILITY (OUTPATIENT)
Dept: INTERNAL MEDICINE CLINIC | Facility: CLINIC | Age: 86
End: 2021-05-07

## 2021-05-07 DIAGNOSIS — E78.5 DYSLIPIDEMIA: ICD-10-CM

## 2021-05-07 DIAGNOSIS — I10 ESSENTIAL HYPERTENSION: ICD-10-CM

## 2021-05-07 DIAGNOSIS — G89.29 CHRONIC LOW BACK PAIN WITHOUT SCIATICA, UNSPECIFIED BACK PAIN LATERALITY: ICD-10-CM

## 2021-05-07 DIAGNOSIS — M54.50 CHRONIC LOW BACK PAIN WITHOUT SCIATICA, UNSPECIFIED BACK PAIN LATERALITY: ICD-10-CM

## 2021-05-07 PROCEDURE — 99308 SBSQ NF CARE LOW MDM 20: CPT | Performed by: INTERNAL MEDICINE

## 2021-05-09 ENCOUNTER — EXTERNAL FACILITY (OUTPATIENT)
Dept: INTERNAL MEDICINE CLINIC | Facility: CLINIC | Age: 86
End: 2021-05-09

## 2021-05-09 DIAGNOSIS — M79.671 PAIN OF BOTH HEELS: ICD-10-CM

## 2021-05-09 DIAGNOSIS — M54.50 CHRONIC MIDLINE LOW BACK PAIN, UNSPECIFIED WHETHER SCIATICA PRESENT: ICD-10-CM

## 2021-05-09 DIAGNOSIS — M79.672 PAIN OF BOTH HEELS: ICD-10-CM

## 2021-05-09 DIAGNOSIS — I10 ESSENTIAL HYPERTENSION: ICD-10-CM

## 2021-05-09 DIAGNOSIS — G89.29 CHRONIC MIDLINE LOW BACK PAIN, UNSPECIFIED WHETHER SCIATICA PRESENT: ICD-10-CM

## 2021-05-09 PROCEDURE — 99308 SBSQ NF CARE LOW MDM 20: CPT | Performed by: INTERNAL MEDICINE

## 2021-05-09 NOTE — PROGRESS NOTES
Location [facility]  Type in person  Date of service 5/9/2021     progressing well  Happy with progress   Looking forward to going home   heel pain better  still hurts with activity     PHYSICAL EXAM:  Vitals reviewed and stable  GENERAL HEAL

## 2021-05-09 NOTE — PROGRESS NOTES
Location [facility]  Type in person  Date of service 5/7/2021     Heel pain on and off   Back pain better   Able to ambulate with walker     PHYSICAL EXAM:  Vitals reviewed and stable  GENERAL HEALTH:  no distress  EYES:   conjunctiva normal

## 2021-05-12 ENCOUNTER — EXTERNAL FACILITY (OUTPATIENT)
Dept: INTERNAL MEDICINE CLINIC | Facility: CLINIC | Age: 86
End: 2021-05-12

## 2021-05-12 DIAGNOSIS — I10 ESSENTIAL HYPERTENSION: ICD-10-CM

## 2021-05-12 DIAGNOSIS — G89.29 CHRONIC MIDLINE LOW BACK PAIN, UNSPECIFIED WHETHER SCIATICA PRESENT: ICD-10-CM

## 2021-05-12 DIAGNOSIS — M54.50 CHRONIC MIDLINE LOW BACK PAIN, UNSPECIFIED WHETHER SCIATICA PRESENT: ICD-10-CM

## 2021-05-12 DIAGNOSIS — M48.07 SPINAL STENOSIS OF LUMBOSACRAL REGION: ICD-10-CM

## 2021-05-12 PROCEDURE — 99307 SBSQ NF CARE SF MDM 10: CPT | Performed by: INTERNAL MEDICINE

## 2021-05-13 NOTE — PROGRESS NOTES
Location [facility]  Type in person  Date of service 5/12/2021     doing well  happy that she is going home tomorrow  no complaints    PHYSICAL EXAM:  Vitals reviewed and stable  GENERAL HEALTH:  no distress, resting comfortable  EYES:   conj

## 2021-05-21 ENCOUNTER — TELEPHONE (OUTPATIENT)
Dept: INTERNAL MEDICINE CLINIC | Facility: CLINIC | Age: 86
End: 2021-05-21

## 2021-05-21 RX ORDER — CHOLECALCIFEROL (VITAMIN D3) 125 MCG
2000 CAPSULE ORAL DAILY
Qty: 90 TABLET | Refills: 3 | Status: SHIPPED | OUTPATIENT
Start: 2021-05-21 | End: 2021-06-20

## 2021-05-21 NOTE — TELEPHONE ENCOUNTER
Spoke with Elvis Cao and relayed MD message, she verbalizes understanding.  Patient was not given any additional capsules of 50,000 units at discharge; she will now start 2000 units daily as rx'd by MD.

## 2021-05-21 NOTE — TELEPHONE ENCOUNTER
To Dr. Jv Lafleur to please advise if you would like to prescribe this? I do not see any previous rx, only vitamin D sent in was 1000 units daily 5/31/2019.

## 2021-05-21 NOTE — TELEPHONE ENCOUNTER
Pepper from [facility] called and is requesting a refill for :    Vitamin  D2 50,000 units       Pepper believes the prescription originated from [facility]    [facility] [place] on the [address] of [address]   [phone]

## 2021-06-07 ENCOUNTER — TELEPHONE (OUTPATIENT)
Dept: INTERNAL MEDICINE CLINIC | Facility: CLINIC | Age: 86
End: 2021-06-07

## 2021-06-07 NOTE — TELEPHONE ENCOUNTER
Would need further evaluation--schedule office visit--should be for 40-minute slot patient has multiple issues

## 2021-06-07 NOTE — TELEPHONE ENCOUNTER
I notified Марина Tanner of MDs recommendation for office visit due to multiple health issues. [facility] is not currently, not with patient and suggested I relay message to patients son Mavis Sofia. I spoke with Mavis Sofia.  He was agreeable with bringing in patient for an

## 2021-06-07 NOTE — TELEPHONE ENCOUNTER
1. Marylou Lopez is currently at patients home. Son called [facility] to report BLE edema. +1 pitting to feet. H/o CHF. No change in weight. Weight today 153lb. Patient responded to my questions in the background. She denies any cough.  No change in SOB, it's at bas

## 2021-06-08 ENCOUNTER — LAB ENCOUNTER (OUTPATIENT)
Dept: LAB | Age: 86
End: 2021-06-08
Attending: INTERNAL MEDICINE
Payer: MEDICARE

## 2021-06-08 ENCOUNTER — OFFICE VISIT (OUTPATIENT)
Dept: INTERNAL MEDICINE CLINIC | Facility: CLINIC | Age: 86
End: 2021-06-08
Payer: MEDICARE

## 2021-06-08 VITALS
WEIGHT: 155 LBS | HEIGHT: 62 IN | TEMPERATURE: 98 F | OXYGEN SATURATION: 98 % | BODY MASS INDEX: 28.52 KG/M2 | DIASTOLIC BLOOD PRESSURE: 60 MMHG | SYSTOLIC BLOOD PRESSURE: 108 MMHG | HEART RATE: 95 BPM

## 2021-06-08 DIAGNOSIS — J44.9 CHRONIC OBSTRUCTIVE PULMONARY DISEASE, UNSPECIFIED COPD TYPE (HCC): ICD-10-CM

## 2021-06-08 DIAGNOSIS — E78.5 DYSLIPIDEMIA: ICD-10-CM

## 2021-06-08 DIAGNOSIS — R06.82 TACHYPNEA, NOT ELSEWHERE CLASSIFIED: ICD-10-CM

## 2021-06-08 DIAGNOSIS — R60.9 EDEMA, UNSPECIFIED TYPE: ICD-10-CM

## 2021-06-08 DIAGNOSIS — N28.89 LEFT RENAL MASS: ICD-10-CM

## 2021-06-08 DIAGNOSIS — R60.9 EDEMA, UNSPECIFIED TYPE: Primary | ICD-10-CM

## 2021-06-08 DIAGNOSIS — I10 ESSENTIAL HYPERTENSION: ICD-10-CM

## 2021-06-08 DIAGNOSIS — M48.07 SPINAL STENOSIS OF LUMBOSACRAL REGION: ICD-10-CM

## 2021-06-08 DIAGNOSIS — R19.8 ALTERED BOWEL FUNCTION: ICD-10-CM

## 2021-06-08 DIAGNOSIS — I73.9 PAD (PERIPHERAL ARTERY DISEASE) (HCC): ICD-10-CM

## 2021-06-08 PROCEDURE — 80053 COMPREHEN METABOLIC PANEL: CPT

## 2021-06-08 PROCEDURE — 83880 ASSAY OF NATRIURETIC PEPTIDE: CPT

## 2021-06-08 PROCEDURE — 85025 COMPLETE CBC W/AUTO DIFF WBC: CPT

## 2021-06-08 PROCEDURE — 36415 COLL VENOUS BLD VENIPUNCTURE: CPT

## 2021-06-08 PROCEDURE — 84443 ASSAY THYROID STIM HORMONE: CPT

## 2021-06-08 PROCEDURE — 84439 ASSAY OF FREE THYROXINE: CPT

## 2021-06-08 PROCEDURE — 99214 OFFICE O/P EST MOD 30 MIN: CPT | Performed by: INTERNAL MEDICINE

## 2021-06-09 ENCOUNTER — TELEPHONE (OUTPATIENT)
Dept: INTERNAL MEDICINE CLINIC | Facility: CLINIC | Age: 86
End: 2021-06-09

## 2021-06-09 RX ORDER — FUROSEMIDE 20 MG/1
20 TABLET ORAL EVERY OTHER DAY
Qty: 1 TABLET | Refills: 0 | COMMUNITY
Start: 2021-06-09 | End: 2021-09-15

## 2021-06-09 NOTE — TELEPHONE ENCOUNTER
This could be secondary to dehydration as previously discussed and my lab note. Replacing fluid by encouraging more p.o. water intake may help to reduce heart rate.   Heart rate should be continue to be monitored by home nurse

## 2021-06-09 NOTE — TELEPHONE ENCOUNTER
Spoke with patient's son, Kimberly Jones (OK per HIPAA), and relayed MD message and instructions, he verbalizes understanding and agrees with plan. Med change to furosemide updated in med rec.      TO Dr. Vickie Charles states visiting nurse told him patient's resting HR

## 2021-06-09 NOTE — TELEPHONE ENCOUNTER
----- Message from Shanita Beltre MD sent at 6/9/2021  9:38 AM CDT -----  BNP is normal--therefore there is no evidence of heart failure to explain patient's lower extremity edema.   Her creatinine has increased to 1.28--this is the reason why patient fee

## 2021-06-10 NOTE — TELEPHONE ENCOUNTER
Spoke to patients son Jessica Cedeno, relayed MD message. Patient verbalized understanding and agrees with plan. Instructed patient to call back with any questions or concerns.

## 2021-06-16 ENCOUNTER — TELEPHONE (OUTPATIENT)
Dept: INTERNAL MEDICINE CLINIC | Facility: CLINIC | Age: 86
End: 2021-06-16

## 2021-06-16 NOTE — TELEPHONE ENCOUNTER
Pt saw Dr Raj Holman last week, furosemide was changed to every other day, which is difficult for patient to remember     Asks if she can take either Mon. Wed. Fri  Or Sun. Tues. Thurs. Sat ?     Heart rate last week was fast & still is today  Running about 104 -

## 2021-06-16 NOTE — TELEPHONE ENCOUNTER
[facility]ariq--no answer.  Will try again later this evening

## 2021-06-17 NOTE — TELEPHONE ENCOUNTER
Spoke with son Caden Justin  Pt to wear compression stockings  Cut down lasix if possible  Keep legs elevated  Encourage hydration        Spoke with Dejan Early:  Pt does not wear compression socks  Stop lasix  Try to help with wearing compression socks, hydration and

## 2021-06-18 NOTE — TELEPHONE ENCOUNTER
Called son per hipaa who [facility].  Explained to son to give office a call , if this pharmacy does not have them - verbalized understanding

## 2021-06-23 ENCOUNTER — TELEPHONE (OUTPATIENT)
Dept: INTERNAL MEDICINE CLINIC | Facility: CLINIC | Age: 86
End: 2021-06-23

## 2021-06-23 NOTE — TELEPHONE ENCOUNTER
Dr. Jv Lafleur, I spoke with home health nurse, Alli Elam. She says the patient had a fall today and fell onto her buttocks. She says the patient did not remember the fall but the OT says that she slipped off of a chair. There was no skin break down.  The patient [place]

## 2021-06-23 NOTE — TELEPHONE ENCOUNTER
Please see if patient is able to walk--if able to walk, no need for ER unless she has worsening pain. If she is unable to walk or bear weight, then I do want her to go to ER. Would recommend ice on her buttocks area.  Please evaluate the skin area one mor

## 2021-06-23 NOTE — TELEPHONE ENCOUNTER
I spoke with Sherrill Masters from [address] [place] [address] and relayed Dr. Nathan Daily message. She verbalized understanding. She says the patient walked to the scale today with a good gait. The patient did not complain of pain while walking. She felt sore when sitting.  She d

## 2021-06-23 NOTE — TELEPHONE ENCOUNTER
Fay from [employer] calling to inform   Patient fell from chair, slid off chair with no injuries. Donte Barakat feels that patient just sat wrong and missed the seat.     Patients vitals:   Blood pressure 150/78    Which is better than last weeks readings, [employer] Safety Inter[employer]

## 2021-06-28 ENCOUNTER — TELEPHONE (OUTPATIENT)
Dept: INTERNAL MEDICINE CLINIC | Facility: CLINIC | Age: 86
End: 2021-06-28

## 2021-06-29 ENCOUNTER — TELEPHONE (OUTPATIENT)
Dept: INTERNAL MEDICINE CLINIC | Facility: CLINIC | Age: 86
End: 2021-06-29

## 2021-06-29 ENCOUNTER — VIRTUAL PHONE E/M (OUTPATIENT)
Dept: INTERNAL MEDICINE CLINIC | Facility: CLINIC | Age: 86
End: 2021-06-29
Payer: MEDICARE

## 2021-06-29 DIAGNOSIS — R19.7 DIARRHEA, UNSPECIFIED TYPE: ICD-10-CM

## 2021-06-29 DIAGNOSIS — R60.9 EDEMA, UNSPECIFIED TYPE: ICD-10-CM

## 2021-06-29 DIAGNOSIS — G89.29 CHRONIC LOW BACK PAIN WITHOUT SCIATICA, UNSPECIFIED BACK PAIN LATERALITY: Primary | ICD-10-CM

## 2021-06-29 DIAGNOSIS — M54.50 CHRONIC LOW BACK PAIN WITHOUT SCIATICA, UNSPECIFIED BACK PAIN LATERALITY: Primary | ICD-10-CM

## 2021-06-29 PROCEDURE — 99214 OFFICE O/P EST MOD 30 MIN: CPT | Performed by: INTERNAL MEDICINE

## 2021-06-29 RX ORDER — QUETIAPINE 50 MG/1
50 TABLET, FILM COATED ORAL NIGHTLY
Qty: 90 TABLET | Refills: 3 | Status: SHIPPED | OUTPATIENT
Start: 2021-06-29

## 2021-06-29 NOTE — TELEPHONE ENCOUNTER
[address] [place] [address] [phone], main number. [facility]  - P) [phone] is on vacation this week. Dr. Amrita Rushing message relayed to intake nurse at main number, understanding verbalized.

## 2021-06-29 NOTE — TELEPHONE ENCOUNTER
Patient would like a call back her legs are hurting very bad  The elastic stockings are hurting her legs, she can't wear them anymore  Patient was told to keep her legs raised which she is doing, she is also drinking a lot of water    Please call patient 7

## 2021-06-29 NOTE — TELEPHONE ENCOUNTER
Spoke with patient who reports she has been having bilateral lower leg pain for the last couple of days. No falls or trauma. She states her compression stockings are hard to put on and cause her pain.  She denies any leg swelling, she tells me she has been

## 2021-06-29 NOTE — PROGRESS NOTES
Virtual Telephone Check-In    Rosa Isela Collins verbally consents to a Virtual/Telephone Check-In visit on 06/29/21. Patient has been referred to the [facility] website at [email or web address] to review the yearly Consent to Treat document.     Esmer

## 2021-06-29 NOTE — TELEPHONE ENCOUNTER
Please call home health---  1) Pt having diarrhea x 2-3 days; hs h/o of cdiff. Please have them to cdiff test  2) ok to stop compression stockings for now; pt having pain. Please monitor for edema  3) I have increased seroquel to 50mg at bedtime (rx was sent)  4) I would like to add physical therapy; pt is deconditioned and having increasing back pain (taking tylenol).  She has spinal stenosis and lumbar radiculopathy

## 2021-07-02 ENCOUNTER — TELEPHONE (OUTPATIENT)
Dept: INTERNAL MEDICINE CLINIC | Facility: CLINIC | Age: 86
End: 2021-07-02

## 2021-07-02 NOTE — TELEPHONE ENCOUNTER
Dr Tereso Devlin asked pt son, Charles Se,  to call with update  Per Cahrles Teague, everything is the same as it was the last time they spoke on Tuesday  Please call Rajendra to discuss/advise  Tasked to nursing
Please advise for DR. RAUSCH patient - called son per hipaa who states patients legs are still swollen like on Tuesday when he spoke with DR RAUSCH . Right foot is cool to touch and both heels are still painful - to DR. Cuong Bo
Spoke to son Zafar Mota and relayed MD message and instructions. He verbalized understanding and agrees with plan. Advised that if any new/worsening symptoms occur, such as loss of sensation to LE's, severe pain, etc to seek out ER evaluation.  He verbalized under
Spoke with son
To Dr. Jarrell Cameron to please advise-----    Spoke to pt's son Dave Jennings (ok per verbal release). Reports that nothing has changed since video visit with Dr. FERNANDES on Tuesday. Dr. FERNANDES was aware at the time that R foot was cool to touch.  Pt has history of stenting/balloonin
To nursing, ok to forego the ER and keep an eye on the cool foot. Hold the furosemide as Dr. Kelsey Candelario recommended. We will send the message on to her to review. She will be back on Tuesday 7/6/21. Thanks.
To nursing, please tell patient's son  In view of her foot being cool to the touch, this raises concern of a circulation problem. It would be best for him to take her to the ER so she can be evaluated.   As with the swelling, she is taking furosemide 20 mg
no

## 2021-07-08 RX ORDER — QUETIAPINE 25 MG/1
TABLET, FILM COATED ORAL
Qty: 90 TABLET | Refills: 1 | OUTPATIENT
Start: 2021-07-08

## 2021-07-08 NOTE — TELEPHONE ENCOUNTER
Current refill request refused due to refill is either a duplicate request or has active refills at the pharmacy. Check previous templates.     Requested Prescriptions     Refused Prescriptions Disp Refills   • QUETIAPINE FUMARATE 25 MG Oral Tab [Pharmacy

## 2021-07-13 ENCOUNTER — TELEPHONE (OUTPATIENT)
Dept: INTERNAL MEDICINE CLINIC | Facility: CLINIC | Age: 86
End: 2021-07-13

## 2021-07-13 NOTE — TELEPHONE ENCOUNTER
Fay from [facility]. is calling to inform Dr. Edmondson File that pt. Has a bruise or possibly forming a hematoma on her left leg ph.  # [phone]  Routed to clinical

## 2021-07-13 NOTE — TELEPHONE ENCOUNTER
Patient is on Plavix and baby aspirin. Spoke with Natalia , [address] [facility]. She reports a possible hematoma to patient's anterior left lower leg.  She states, it \"feels like a pillow\", \"but it doesn't feel like it's deep\"; tender to touch; does not look li

## 2021-07-14 ENCOUNTER — OFFICE VISIT (OUTPATIENT)
Dept: INTERNAL MEDICINE CLINIC | Facility: CLINIC | Age: 86
End: 2021-07-14
Payer: MEDICARE

## 2021-07-14 ENCOUNTER — LAB ENCOUNTER (OUTPATIENT)
Dept: LAB | Age: 86
End: 2021-07-14
Attending: INTERNAL MEDICINE
Payer: MEDICARE

## 2021-07-14 VITALS
HEART RATE: 82 BPM | HEIGHT: 62 IN | DIASTOLIC BLOOD PRESSURE: 62 MMHG | BODY MASS INDEX: 28 KG/M2 | SYSTOLIC BLOOD PRESSURE: 124 MMHG | OXYGEN SATURATION: 96 %

## 2021-07-14 DIAGNOSIS — R35.0 URINARY FREQUENCY: ICD-10-CM

## 2021-07-14 DIAGNOSIS — R53.83 FATIGUE, UNSPECIFIED TYPE: ICD-10-CM

## 2021-07-14 DIAGNOSIS — S80.12XA LEG HEMATOMA, LEFT, INITIAL ENCOUNTER: ICD-10-CM

## 2021-07-14 DIAGNOSIS — E55.9 VITAMIN D DEFICIENCY: ICD-10-CM

## 2021-07-14 DIAGNOSIS — S80.12XA LEG HEMATOMA, LEFT, INITIAL ENCOUNTER: Primary | ICD-10-CM

## 2021-07-14 DIAGNOSIS — R22.42 LOCALIZED SWELLING, MASS AND LUMP, LEFT LOWER LIMB: ICD-10-CM

## 2021-07-14 LAB
ANION GAP SERPL CALC-SCNC: 9 MMOL/L (ref 0–18)
BASOPHILS # BLD AUTO: 0.08 X10(3) UL (ref 0–0.2)
BASOPHILS NFR BLD AUTO: 1.3 %
BUN BLD-MCNC: 21 MG/DL (ref 7–18)
BUN/CREAT SERPL: 20.6 (ref 10–20)
CALCIUM BLD-MCNC: 9.5 MG/DL (ref 8.5–10.1)
CHLORIDE SERPL-SCNC: 110 MMOL/L (ref 98–112)
CO2 SERPL-SCNC: 24 MMOL/L (ref 21–32)
CREAT BLD-MCNC: 1.02 MG/DL
DEPRECATED RDW RBC AUTO: 46.4 FL (ref 35.1–46.3)
EOSINOPHIL # BLD AUTO: 0.2 X10(3) UL (ref 0–0.7)
EOSINOPHIL NFR BLD AUTO: 3.2 %
ERYTHROCYTE [DISTWIDTH] IN BLOOD BY AUTOMATED COUNT: 12.9 % (ref 11–15)
GLUCOSE BLD-MCNC: 84 MG/DL (ref 70–99)
HCT VFR BLD AUTO: 46.2 %
HGB BLD-MCNC: 14.8 G/DL
IMM GRANULOCYTES # BLD AUTO: 0.02 X10(3) UL (ref 0–1)
IMM GRANULOCYTES NFR BLD: 0.3 %
LYMPHOCYTES # BLD AUTO: 1.02 X10(3) UL (ref 1–4)
LYMPHOCYTES NFR BLD AUTO: 16.2 %
MCH RBC QN AUTO: 31.2 PG (ref 26–34)
MCHC RBC AUTO-ENTMCNC: 32 G/DL (ref 31–37)
MCV RBC AUTO: 97.5 FL
MONOCYTES # BLD AUTO: 0.43 X10(3) UL (ref 0.1–1)
MONOCYTES NFR BLD AUTO: 6.8 %
NEUTROPHILS # BLD AUTO: 4.54 X10 (3) UL (ref 1.5–7.7)
NEUTROPHILS # BLD AUTO: 4.54 X10(3) UL (ref 1.5–7.7)
NEUTROPHILS NFR BLD AUTO: 72.2 %
OSMOLALITY SERPL CALC.SUM OF ELEC: 298 MOSM/KG (ref 275–295)
PATIENT FASTING Y/N/NP: NO
PLATELET # BLD AUTO: 170 10(3)UL (ref 150–450)
POTASSIUM SERPL-SCNC: 4.5 MMOL/L (ref 3.5–5.1)
RBC # BLD AUTO: 4.74 X10(6)UL
SODIUM SERPL-SCNC: 143 MMOL/L (ref 136–145)
VIT B12 SERPL-MCNC: 462 PG/ML (ref 193–986)
WBC # BLD AUTO: 6.3 X10(3) UL (ref 4–11)

## 2021-07-14 PROCEDURE — 82607 VITAMIN B-12: CPT

## 2021-07-14 PROCEDURE — 36415 COLL VENOUS BLD VENIPUNCTURE: CPT

## 2021-07-14 PROCEDURE — 99215 OFFICE O/P EST HI 40 MIN: CPT | Performed by: INTERNAL MEDICINE

## 2021-07-14 PROCEDURE — 82306 VITAMIN D 25 HYDROXY: CPT

## 2021-07-14 PROCEDURE — 85025 COMPLETE CBC W/AUTO DIFF WBC: CPT

## 2021-07-14 PROCEDURE — 80048 BASIC METABOLIC PNL TOTAL CA: CPT

## 2021-07-14 NOTE — TELEPHONE ENCOUNTER
Dave Yvette was evaluated today in office by Dr. Hannah Driver MD, Spoke with [place] RN Libby Vora ([phone]) and provided new orders to increase [place] visits to 2x a week for the next 3 weeks to monitor LLE hematoma.  Provided Libby Vora with spelling of PCP along with dion

## 2021-07-14 NOTE — TELEPHONE ENCOUNTER
Please see if patient can come in today (ok to see another doc)---will need to coordinate with her son Neo Medina

## 2021-07-14 NOTE — PROGRESS NOTES
Chief Complaint:   Patient presents with:  Checkup: Hematoma Left Lower Leg     HPI:     Ms. Meche Smith is a 80year old female past medical history of peripheral arterial disease, hypertension, COPD, lumbosacral spinal stenosis, left renal mass, dyslipidemia [facility])    • Macular degeneration    • Neurogenic claudication    • Nodule of kidney    • Osteoporosis    • Osteoporosis    • PAD (peripheral artery disease) (HCC)    • Peripheral vascular disease (HCC)    • Renal disorder    • Renal insufficiency    • Subcl Oral Tab Take 1 tablet (25 mg total) by mouth every evening. 30 tablet 3   • Budesonide-Formoterol Fumarate 160-4.5 MCG/ACT Inhalation Aerosol Inhale 2 puffs into the lungs 2 (two) times daily.  3 Inhaler 2   • ATORVASTATIN 40 MG Oral Tab TAKE 1 TABLET BY M Syncope, Dizziness, Headache, Falls  Psych:  Anxiety, Depression, Insomnia, Suicidal Ideation, Homicidal ideation, Memory Changes  Heme/Lymph: Bruising, Bleeding, Lymphadenopathy  Endocrine: Polyuria, Polydipsia, Temperature Intolerance    EXAM:   Vital Sig inflammation.  -Cilostazol 100 mg twice a day added 4/14/2021  –Intermittent pain requiring Tylenol  -We discussed monitoring for any worsening of symptoms including worsening of the hematoma, swelling, numbness and tingling of the left foot, difficulties treatment options, diet, exercise, review of available labs and radiology reports, and completing documentation.      RTC PRN    Seferino Segovia, 07/14/21, 2:34 PM

## 2021-07-14 NOTE — PATIENT INSTRUCTIONS
You are seen in clinic for left lower leg hematoma possibly from a minor injury in the setting of the use of 2 blood thinning medications. Thankfully, the hematoma seems to have remained stable without further enlargement.   We discussed the risk of stop

## 2021-07-14 NOTE — TELEPHONE ENCOUNTER
Noted. Spoke to son (ok per hipaa) who was aware of situation and agreeable to bringing in pt today after 11AM. Limited schedules with MD's available - pt was scheduled with Dr. Otf Carreno at 1330. Pt screened. No change in insurance per son.

## 2021-07-16 ENCOUNTER — APPOINTMENT (OUTPATIENT)
Dept: LAB | Facility: HOSPITAL | Age: 86
End: 2021-07-16
Attending: INTERNAL MEDICINE
Payer: MEDICARE

## 2021-07-16 LAB
25(OH)D3 SERPL-MCNC: 32.1 NG/ML (ref 30–100)
BILIRUB UR QL: NEGATIVE
CLARITY UR: CLEAR
COLOR UR: YELLOW
GLUCOSE UR-MCNC: NEGATIVE MG/DL
HGB UR QL STRIP.AUTO: NEGATIVE
KETONES UR-MCNC: NEGATIVE MG/DL
LEUKOCYTE ESTERASE UR QL STRIP.AUTO: NEGATIVE
NITRITE UR QL STRIP.AUTO: POSITIVE
PH UR: 6 [PH] (ref 5–8)
PROT UR-MCNC: NEGATIVE MG/DL
SP GR UR STRIP: 1.01 (ref 1–1.03)
UROBILINOGEN UR STRIP-ACNC: <2

## 2021-07-16 PROCEDURE — 81001 URINALYSIS AUTO W/SCOPE: CPT

## 2021-07-18 ENCOUNTER — TELEPHONE (OUTPATIENT)
Dept: INTERNAL MEDICINE CLINIC | Facility: CLINIC | Age: 86
End: 2021-07-18

## 2021-07-19 NOTE — TELEPHONE ENCOUNTER
Spoke to patient and relayed MD message, patient verbalized understanding. Patient reports she is \"doing fine\" and is babying the area. There is pain if she presses on the area. She denies any swelling, numbness or tingling.  Reminded patient if she devel

## 2021-07-19 NOTE — TELEPHONE ENCOUNTER
Please notify the patient and son Yo Sportsman that her work-up came back unremarkable:    Kidney function is stable  All blood counts including RBC/Hb normal  Vitamin D and B12 normal    Can we get a condition update on her LLE hematoma?     No new recs, will fo

## 2021-07-27 ENCOUNTER — TELEPHONE (OUTPATIENT)
Dept: INTERNAL MEDICINE CLINIC | Facility: CLINIC | Age: 86
End: 2021-07-27

## 2021-07-27 NOTE — TELEPHONE ENCOUNTER
Patient is calling to speak with Dr Barney Workman regarding symptoms after her stroke  She is not getting any better, she is always tired, she is not speaking right    Please call patient [phone]

## 2021-07-28 NOTE — TELEPHONE ENCOUNTER
Please advise - called patient who was at [facility] after stroke ,still tired all the time, cannot get out words right ever since the stroke , no new symptoms - to DR. Krish Aguila

## 2021-08-03 ENCOUNTER — TELEPHONE (OUTPATIENT)
Dept: INTERNAL MEDICINE CLINIC | Facility: CLINIC | Age: 86
End: 2021-08-03

## 2021-08-03 NOTE — TELEPHONE ENCOUNTER
Family is requesting home health speech therapy due to a CVA, Ranjana Travis @ [facility]  needs a written order & last OV notes sent to fax# [phone]  Office ph# [phone]

## 2021-08-03 NOTE — TELEPHONE ENCOUNTER
RX for speech therapy along with last office note faxed to Orin Mckeon at [facility] at [phone]-[id number] recieved  Original to scanning , copy in weekly folder

## 2021-08-03 NOTE — TELEPHONE ENCOUNTER
Delmi Almaraz called back to check status of orders and recent office note  ph. # [phone]  Fax.  # [phone]  Routed to clinical

## 2021-08-11 ENCOUNTER — TELEPHONE (OUTPATIENT)
Dept: INTERNAL MEDICINE CLINIC | Facility: CLINIC | Age: 86
End: 2021-08-11

## 2021-08-11 NOTE — TELEPHONE ENCOUNTER
Fay/Re  called  States pt is weak, hard for her to get around with her walker, home health is not benefiting patient  Is there an in patient rehab she can go to without being hospitalized first     Please call Janene Cameron to discuss/advise [phone]

## 2021-08-17 ENCOUNTER — TELEPHONE (OUTPATIENT)
Dept: INTERNAL MEDICINE CLINIC | Facility: CLINIC | Age: 86
End: 2021-08-17

## 2021-08-17 NOTE — TELEPHONE ENCOUNTER
Fax received from [facility] requesting a note or addendum that indicates CVS, dysarthria. Requesting 6/29/21 Face to Face (virtual visit). Face to Face faxed to [phone].

## 2021-09-14 ENCOUNTER — TELEPHONE (OUTPATIENT)
Dept: INTERNAL MEDICINE CLINIC | Facility: CLINIC | Age: 86
End: 2021-09-14

## 2021-09-14 NOTE — TELEPHONE ENCOUNTER
To Dr. Zoya Shabazz - see below, fyi. Per Dhaval 3 RN:  Pt's sat ranging from 77-88%, 2+ edema in feet, lung crackles seem to be worse. Advised ER/911 - understanding verbalized. Nursing to f/u tomorrow.

## 2021-09-14 NOTE — TELEPHONE ENCOUNTER
Home Health is calling and states they would like to call 911 do to patients saturations levels but the patient does not want to go until she talks to tamika

## 2021-09-14 NOTE — TELEPHONE ENCOUNTER
Erica Grimaldo [facility] left voice mail message    Paramedics were called, they did not take patient  When paramedics re checked oxygen level it was 95    (Thinks low readings were a mal function of her pulse ox meter)    Erica still wants to address new on set   of edema of both feet    Call back # [phone]

## 2021-09-15 ENCOUNTER — OFFICE VISIT (OUTPATIENT)
Dept: INTERNAL MEDICINE CLINIC | Facility: CLINIC | Age: 86
End: 2021-09-15
Payer: MEDICARE

## 2021-09-15 VITALS
BODY MASS INDEX: 29.59 KG/M2 | SYSTOLIC BLOOD PRESSURE: 130 MMHG | WEIGHT: 160.81 LBS | OXYGEN SATURATION: 95 % | HEART RATE: 90 BPM | DIASTOLIC BLOOD PRESSURE: 84 MMHG | HEIGHT: 62 IN | TEMPERATURE: 98 F

## 2021-09-15 DIAGNOSIS — I10 HYPERTENSION, UNSPECIFIED TYPE: Primary | ICD-10-CM

## 2021-09-15 DIAGNOSIS — R60.9 EDEMA, UNSPECIFIED TYPE: ICD-10-CM

## 2021-09-15 PROCEDURE — 99213 OFFICE O/P EST LOW 20 MIN: CPT | Performed by: INTERNAL MEDICINE

## 2021-09-15 RX ORDER — FUROSEMIDE 20 MG/1
10 TABLET ORAL SEE ADMIN INSTRUCTIONS
Qty: 18 TABLET | Refills: 2 | Status: SHIPPED | OUTPATIENT
Start: 2021-09-15 | End: 2021-11-03

## 2021-09-15 NOTE — TELEPHONE ENCOUNTER
Per MD request, Spoke to [address] [place] [address] with [facility] and advised that MD is ordering lasix 10mg 3 times weekly (mon, wed, fri) with repeat BMP next week on Tuesday or Wednesday. Erica verbalized understanding.

## 2021-09-15 NOTE — TELEPHONE ENCOUNTER
Tish Andre / [facility] called     Please call Tish Andre if there are any changes to treatment after pt is seen today    [phone]

## 2021-09-15 NOTE — TELEPHONE ENCOUNTER
FREDI to Dr Bridgette Jacobs message below  [address] asking for call if there are any changes to treatment plan after patient is seen by you today at 4pm

## 2021-09-16 NOTE — PROGRESS NOTES
Heraclio Walls is a 80year old female. Patient presents with:  Checkup: edema bilateral legs/feet pitting,       HPI:   Heraclio Walls is a 80year old female who presents for: follow up leg swelling    Nurse from [facility] called regarding MG/3ML Inhalation Solution Take 3 mL by nebulization every 6 (six) hours as needed. 0   • Albuterol Sulfate HFA (PROAIR HFA) 108 (90 Base) MCG/ACT Inhalation Aero Soln Inhale 2 puffs into the lungs every 4 (four) hours as needed for Wheezing.  1 Inhaler 6 Other         Aunt had colon cancer   • Cancer Sister         breast   • Colon Cancer Sister 68      Social History:   Social History    Tobacco Use      Smoking status: Former Smoker        Quit date: 3/1/2014        Years since quittin.5      Smokele

## 2021-09-22 ENCOUNTER — TELEPHONE (OUTPATIENT)
Dept: INTERNAL MEDICINE CLINIC | Facility: CLINIC | Age: 86
End: 2021-09-22

## 2021-09-22 NOTE — TELEPHONE ENCOUNTER
Orders from [facility] signed by Dr. Umesh Dupree. Faxed back to [phone]. Fax confirmation received. Copy to [facility] then to [facility].

## 2021-09-28 ENCOUNTER — TELEPHONE (OUTPATIENT)
Dept: INTERNAL MEDICINE CLINIC | Facility: CLINIC | Age: 86
End: 2021-09-28

## 2021-09-28 DIAGNOSIS — I10 ESSENTIAL HYPERTENSION: Primary | ICD-10-CM

## 2021-09-28 NOTE — TELEPHONE ENCOUNTER
Please notify patient, pt's son and HH that her labs look good; I would like to increase the lasix to one full tablet (20mg) 4 times weekly.    We can repeat bmp in 2 weeks (order for bmp was placed; ok to fax)

## 2021-10-05 ENCOUNTER — MED REC SCAN ONLY (OUTPATIENT)
Dept: INTERNAL MEDICINE CLINIC | Facility: CLINIC | Age: 86
End: 2021-10-05

## 2021-10-13 RX ORDER — FUROSEMIDE 20 MG/1
10 TABLET ORAL SEE ADMIN INSTRUCTIONS
Qty: 18 TABLET | Refills: 2 | OUTPATIENT
Start: 2021-10-13

## 2021-10-13 NOTE — TELEPHONE ENCOUNTER
Current refill request refused due to refill is either a duplicate request or has active refills at the pharmacy. Check previous templates.     Requested Prescriptions     Refused Prescriptions Disp Refills   • furosemide 20 MG Oral Tab 18 tablet 2     Sig

## 2021-10-22 NOTE — TELEPHONE ENCOUNTER
Plavix has active refills on file with [facility] [place] until February 2022. To FD, please call pt to confirm preferred pharmacy for this prescription then back to Rx. Thanks!

## 2021-10-25 NOTE — TELEPHONE ENCOUNTER
Patient was called and had no idea which pharmacy she uses. Patient requested  call her son, Zoie Sullivan.  Zoie Sullivan was called (okay per HIPAA) and confirmed that the [facility] [place] and to please ignore the request from [email or web address] (patient is

## 2021-10-26 RX ORDER — CLOPIDOGREL BISULFATE 75 MG/1
TABLET ORAL
Qty: 90 TABLET | Refills: 3 | OUTPATIENT
Start: 2021-10-26

## 2021-10-26 NOTE — TELEPHONE ENCOUNTER
Noted, refill request from [facility] denied.      Requested Prescriptions     Refused Prescriptions Disp Refills   • CLOPIDOGREL 75 MG Oral Tab [Pharmacy Med Name: Clopidogrel Bisulfate 75 MG Oral Tablet] 90 tablet 3     Sig: TAKE 1 TABLET BY MOUTH  DAILY

## 2021-11-02 NOTE — TELEPHONE ENCOUNTER
Please advise - unsure if patient still has [place] - this task is from 9/29/21 to DR. Amparo Mojica

## 2021-11-03 RX ORDER — FUROSEMIDE 20 MG/1
TABLET ORAL
Qty: 1 TABLET | Refills: 0 | COMMUNITY
Start: 2021-11-03

## 2021-11-03 NOTE — TELEPHONE ENCOUNTER
Spoke with Eric Mann; she no longer works for [employer]. Spoke with Sixto Spencer from [email or web address] who reports patient has been discharged from [facility].

## 2021-11-03 NOTE — TELEPHONE ENCOUNTER
Spoke with son, Yelena Kiran, and relayed MD message below. He verbalizes understanding and agrees with plan. Med rec updated.

## 2021-11-08 RX ORDER — CLOPIDOGREL BISULFATE 75 MG/1
TABLET ORAL
Qty: 90 TABLET | Refills: 3 | OUTPATIENT
Start: 2021-11-08

## 2021-11-08 NOTE — TELEPHONE ENCOUNTER
Clopidogrel refill request received from [facility] mail order. Clopidogrel was refilled 2/15/21 for one year to [facility] in [place]. I see 10/20/21 phone call. Patient's son explained that the patient is using [facility]. Refill request denied.

## 2021-11-10 ENCOUNTER — MED REC SCAN ONLY (OUTPATIENT)
Dept: INTERNAL MEDICINE CLINIC | Facility: CLINIC | Age: 86
End: 2021-11-10

## 2021-11-22 ENCOUNTER — APPOINTMENT (OUTPATIENT)
Dept: CT IMAGING | Facility: HOSPITAL | Age: 86
End: 2021-11-22
Attending: EMERGENCY MEDICINE
Payer: MEDICARE

## 2021-11-22 ENCOUNTER — HOSPITAL ENCOUNTER (EMERGENCY)
Facility: HOSPITAL | Age: 86
Discharge: HOME | End: 2021-11-22
Attending: EMERGENCY MEDICINE
Payer: MEDICARE

## 2021-11-22 VITALS
RESPIRATION RATE: 18 BRPM | DIASTOLIC BLOOD PRESSURE: 66 MMHG | OXYGEN SATURATION: 95 % | HEART RATE: 82 BPM | SYSTOLIC BLOOD PRESSURE: 106 MMHG | TEMPERATURE: 98 F

## 2021-11-22 DIAGNOSIS — N30.00 ACUTE CYSTITIS WITHOUT HEMATURIA: ICD-10-CM

## 2021-11-22 DIAGNOSIS — M54.59 INTRACTABLE LOW BACK PAIN: Primary | ICD-10-CM

## 2021-11-22 PROCEDURE — 87086 URINE CULTURE/COLONY COUNT: CPT | Performed by: EMERGENCY MEDICINE

## 2021-11-22 PROCEDURE — 87186 SC STD MICRODIL/AGAR DIL: CPT | Performed by: EMERGENCY MEDICINE

## 2021-11-22 PROCEDURE — 85025 COMPLETE CBC W/AUTO DIFF WBC: CPT | Performed by: EMERGENCY MEDICINE

## 2021-11-22 PROCEDURE — 72131 CT LUMBAR SPINE W/O DYE: CPT | Performed by: EMERGENCY MEDICINE

## 2021-11-22 PROCEDURE — 87088 URINE BACTERIA CULTURE: CPT | Performed by: EMERGENCY MEDICINE

## 2021-11-22 PROCEDURE — 99285 EMERGENCY DEPT VISIT HI MDM: CPT

## 2021-11-22 PROCEDURE — 80048 BASIC METABOLIC PNL TOTAL CA: CPT | Performed by: EMERGENCY MEDICINE

## 2021-11-22 PROCEDURE — 96366 THER/PROPH/DIAG IV INF ADDON: CPT

## 2021-11-22 PROCEDURE — 96365 THER/PROPH/DIAG IV INF INIT: CPT

## 2021-11-22 PROCEDURE — 81001 URINALYSIS AUTO W/SCOPE: CPT | Performed by: EMERGENCY MEDICINE

## 2021-11-22 RX ORDER — CEPHALEXIN 500 MG/1
500 CAPSULE ORAL 2 TIMES DAILY
Qty: 14 CAPSULE | Refills: 0 | Status: SHIPPED | OUTPATIENT
Start: 2021-11-22 | End: 2021-11-29

## 2021-11-22 NOTE — ED PROVIDER NOTES
Patient Seen in: [facility] AND [facility] Emergency Department      History   Patient presents with:  Back Pain    Stated Complaint: back pain    Subjective:   HPI    80year old female with a past medical history of JAMES, chronic back pain, kidney cancer, known Subclavian arterial stenosis (HCC)    • UTI (urinary tract infection)    • Vertigo    • Visual impairment               Past Surgical History:   Procedure Laterality Date   • ANGIOPLASTY (CORONARY)     • APPENDECTOMY     • BACK SURGERY      L3-S2 at [facility] normal. No respiratory distress. Breath sounds: Normal breath sounds. No wheezing. Abdominal:      General: There is no distension or abdominal bruit. Palpations: Abdomen is soft. There is no mass or pulsatile mass. Tenderness:  There is no Abnormality         Status                     ---------                               -----------         ------                     CBC W/ DIFFERENTIAL[[id number]]          Abnormal            Final result                 Ple with PCP office, Dr. Behzad Chang, regarding this and he agrees with plan.           Disposition and Plan     Clinical Impression:  Intractable low back pain  (primary encounter diagnosis)  Acute cystitis without hematuria     Disposition:  Discharge  11/22/202

## 2021-11-22 NOTE — CM/SW NOTE
[facility] admissions is gone for the day and the possible admission will need to wait until tomorrow per Olegario Corey. Contacted Bettina from [facility] and she will get back to [facility].

## 2021-11-22 NOTE — ED INITIAL ASSESSMENT (HPI)
Pt BIBEMS from [facility] independent living with c/o atraumatic lower back pain since this morning. Pt denies urinary complaints, back is tender to palpation.

## 2021-11-23 NOTE — CM/SW NOTE
The pt has been accepted to [facility] in [place] [address]: [address] [facility] to nurse report [facility] called for BLS transport ETA: 2130  PCS completed

## 2021-11-23 NOTE — ED QUICK NOTES
[facility] here to transport patient to [facility].   All belongings sent with patient

## 2021-11-23 NOTE — CM/SW NOTE
Spoke to the pt and her son at the bedside. If [facility] is available they are in agreement to go there for rehab. Contact Rajendra when his mom has been accepted.  [phone]

## 2021-11-23 NOTE — ED QUICK NOTES
Report given to [facility] at [facility]

## 2021-11-24 ENCOUNTER — INITIAL APN SNF VISIT (OUTPATIENT)
Dept: INTERNAL MEDICINE CLINIC | Facility: SKILLED NURSING FACILITY | Age: 86
End: 2021-11-24

## 2021-11-24 VITALS
OXYGEN SATURATION: 96 % | TEMPERATURE: 98 F | DIASTOLIC BLOOD PRESSURE: 58 MMHG | SYSTOLIC BLOOD PRESSURE: 113 MMHG | HEART RATE: 68 BPM | RESPIRATION RATE: 20 BRPM

## 2021-11-24 DIAGNOSIS — I10 ESSENTIAL HYPERTENSION: ICD-10-CM

## 2021-11-24 DIAGNOSIS — J44.9 CHRONIC OBSTRUCTIVE PULMONARY DISEASE, UNSPECIFIED COPD TYPE (HCC): ICD-10-CM

## 2021-11-24 DIAGNOSIS — F03.90 DEMENTIA WITHOUT BEHAVIORAL DISTURBANCE, UNSPECIFIED DEMENTIA TYPE (HCC): ICD-10-CM

## 2021-11-24 DIAGNOSIS — E78.5 DYSLIPIDEMIA: ICD-10-CM

## 2021-11-24 PROCEDURE — 1123F ACP DISCUSS/DSCN MKR DOCD: CPT | Performed by: NURSE PRACTITIONER

## 2021-11-24 PROCEDURE — 99310 SBSQ NF CARE HIGH MDM 45: CPT | Performed by: NURSE PRACTITIONER

## 2021-11-24 NOTE — PROGRESS NOTES
Neldapaul Slater  : 1934  Age 80year old  female patient is admitted to [facility]  for rehab and strengthening  Directly from the [address] [place] [address] ED     Chief complaint: back pain, compression fracture , dementia , copd      HP Medical History:   Diagnosis Date   • JAMES (acute kidney injury) ([facility] [address]    • Anxiety    • Arthritis    • Back problem    • C. difficile diarrhea    • Cancer [facility])     Kidney   • Carotid stenosis    • Compression fracture of L1 lumbar vertebra (HCC)    • Comp Drug use: No      ALLERGIES:  No Known Allergies    CODE STATUS:  DNR    ADVANCED CARE PLANNING TEAM: will need family care plan       CURRENT MEDICATIONS - reviewed and updated     Current Outpatient Medications   Medication Sig Dispense Refill   • cephal visual complaints or deficits  HENT: denies nasal congestion, sinus pain or sore throat;  RESPIRATORY: denies shortness of breath, wheezing or cough   CARDIOVASCULAR:denies chest pain, no palpitations   GI: denies nausea, vomiting, constipation, diarrhea; in case   -lidoderm patch 55 12 hrs on to back and 12 hrs off. Will continue in rehab   -PT/OT following in rehab   -Monitor     2. Acute cystitis without hematuria   -vs q shift  -cont keflex 500mg po bid x 7 days   -denies pelvic pain or urgency     3.  C

## 2021-11-29 ENCOUNTER — TELEPHONE (OUTPATIENT)
Dept: INTERNAL MEDICINE CLINIC | Facility: CLINIC | Age: 86
End: 2021-11-29

## 2021-11-29 NOTE — TELEPHONE ENCOUNTER
Son Angela Munoz called  Requests call back from Dr Alexandre Chaney with update on his mom who is currently at [facility] in [place]    How long does she need to be in rehab and what are next steps?     Please call Angela Munoz [phone]    - ok for Dr Alexandre Chaney tomorrow

## 2021-11-30 ENCOUNTER — SNF VISIT (OUTPATIENT)
Dept: INTERNAL MEDICINE CLINIC | Facility: SKILLED NURSING FACILITY | Age: 86
End: 2021-11-30

## 2021-11-30 VITALS
BODY MASS INDEX: 29 KG/M2 | HEART RATE: 72 BPM | RESPIRATION RATE: 20 BRPM | TEMPERATURE: 98 F | SYSTOLIC BLOOD PRESSURE: 124 MMHG | WEIGHT: 155.81 LBS | DIASTOLIC BLOOD PRESSURE: 77 MMHG | OXYGEN SATURATION: 98 %

## 2021-11-30 DIAGNOSIS — F03.90 DEMENTIA WITHOUT BEHAVIORAL DISTURBANCE, UNSPECIFIED DEMENTIA TYPE (HCC): ICD-10-CM

## 2021-11-30 DIAGNOSIS — R53.1 GENERALIZED WEAKNESS: ICD-10-CM

## 2021-11-30 DIAGNOSIS — I10 ESSENTIAL HYPERTENSION: ICD-10-CM

## 2021-11-30 PROCEDURE — 99309 SBSQ NF CARE MODERATE MDM 30: CPT | Performed by: NURSE PRACTITIONER

## 2021-11-30 NOTE — PROGRESS NOTES
Alissa Mcneal  : 1934  Age 80year old  female patient is admitted to [facility] 21  for rehab and strengthening  Directly from the [facility] ED      Chief complaint: back pain, compression fracture , dementia , copd      incontinence.  No other new complaints or issues.     ALLERGIES:  No Known Allergies    CODE STATUS:  DNR    ADVANCED CARE PLANNING TEAM: family care plan, unclear of dc date, naveed [facility] in [facility] - reviewed and updated     Ck visual complaints or deficits  HENT: denies nasal congestion, sinus pain or sore throat;  RESPIRATORY: denies shortness of breath, wheezing or cough   CARDIOVASCULAR:denies chest pain, no palpitations   GI: denies nausea, vomiting, constipation, diarrhea; -PT/OT following in rehab   -Monitor      2. Acute cystitis without hematuria   -vs q shift  -cont keflex 500mg po bid x 7 days   -denies pelvic pain or urgency      3.  COPD   -wheezing   -cont pro air daily  -cont duonebs q 6   -cont symbicort bid   -Pu

## 2021-12-01 ENCOUNTER — EXTERNAL FACILITY (OUTPATIENT)
Dept: PULMONOLOGY | Facility: CLINIC | Age: 86
End: 2021-12-01

## 2021-12-01 DIAGNOSIS — J44.9 CHRONIC OBSTRUCTIVE PULMONARY DISEASE, UNSPECIFIED COPD TYPE (HCC): Primary | ICD-10-CM

## 2021-12-01 PROCEDURE — 99307 SBSQ NF CARE SF MDM 10: CPT | Performed by: PHYSICIAN ASSISTANT

## 2021-12-03 NOTE — PROGRESS NOTES
Pulmonary Progress Note  SNF [address]    History of present illness: The patient is an 80year old female who I am seeing for follow-up at [facility].  The patient was recently evaluated in ED for back pain with compressio Karuna Castaneda  Pulmonary Medicine

## 2021-12-07 ENCOUNTER — TELEPHONE (OUTPATIENT)
Dept: INTERNAL MEDICINE CLINIC | Facility: CLINIC | Age: 86
End: 2021-12-07

## 2021-12-07 NOTE — TELEPHONE ENCOUNTER
Please call [facility] to confirm Dr William Castillo will sign [place] health orders    [phone]

## 2021-12-07 NOTE — TELEPHONE ENCOUNTER
Katya Simon is calling to confirm Dr Roopa Pate will sign HH orders  Dr Devika Gill message relayed that she will sign HH orders

## 2021-12-08 ENCOUNTER — TELEPHONE (OUTPATIENT)
Dept: INTERNAL MEDICINE CLINIC | Facility: CLINIC | Age: 86
End: 2021-12-08

## 2021-12-08 NOTE — TELEPHONE ENCOUNTER
[facility] called     Pt was admitted to home health today for skilled nursing, physical & occupational therapy     No call back needed

## 2021-12-15 NOTE — TELEPHONE ENCOUNTER
Ezetimibe last sent to the [facility] in [place]. Now getting request from [facility]. [facility] as preferred [facility]. LMTCB - what pharmacy does patient want refill going to?

## 2021-12-27 RX ORDER — EZETIMIBE 10 MG/1
TABLET ORAL
Qty: 90 TABLET | Refills: 3 | OUTPATIENT
Start: 2021-12-27

## 2022-01-01 RX ORDER — EZETIMIBE 10 MG/1
TABLET ORAL
Qty: 90 TABLET | Refills: 3 | OUTPATIENT
Start: 2022-01-01

## 2022-01-01 NOTE — TELEPHONE ENCOUNTER
Current refill request refused due to refill is either a duplicate request or has active refills at the pharmacy. Check previous templates.     Requested Prescriptions     Refused Prescriptions Disp Refills   • EZETIMIBE 10 MG Oral Tab [Pharmacy Med Name:

## 2022-01-27 ENCOUNTER — TELEPHONE (OUTPATIENT)
Dept: INTERNAL MEDICINE CLINIC | Facility: CLINIC | Age: 87
End: 2022-01-27

## 2022-01-27 NOTE — TELEPHONE ENCOUNTER
Dr. Barney Workman reviewed and signed home health orders. Faxed back to [facility] at [phone]. Fax confirmation received. Copy to [facility] then to [facility].

## 2022-02-02 ENCOUNTER — TELEPHONE (OUTPATIENT)
Dept: INTERNAL MEDICINE CLINIC | Facility: CLINIC | Age: 87
End: 2022-02-02

## 2022-02-02 NOTE — TELEPHONE ENCOUNTER
Faxed orders signed by MD for rehab services PT and OT at [facility] at [phone], confirmation received. Order sent to scan.

## 2022-03-08 NOTE — TELEPHONE ENCOUNTER
Last \"pain patch\" looks like lidocaine-menthol 4-1 % 1 patch which was not prescribed by our office. Nursing, See other meds pended below. Also note, LOV 9/15/21 MD states, \"Discussed consideration of moving into skilled nursing facility for increased care. \"

## 2022-03-08 NOTE — TELEPHONE ENCOUNTER
Patients son is calling and states that his mother needs a refill on her pain patch. The patients son does not remember the name of the medication. All he knows its that its her pain patch. Pt is completely out of this medication.     [facility] in [facility]

## 2022-03-08 NOTE — TELEPHONE ENCOUNTER
Patient son called back and requested refills for ATORVASTATIN and CILOSTAZOL also to be sent to [facility] in [facility].

## 2022-03-09 RX ORDER — ATORVASTATIN CALCIUM 40 MG/1
40 TABLET, FILM COATED ORAL DAILY
Qty: 90 TABLET | Refills: 3 | Status: SHIPPED | OUTPATIENT
Start: 2022-03-09

## 2022-03-09 NOTE — TELEPHONE ENCOUNTER
Son, Kirby Mckinley is calling to check the status of the Lidocaine-menthol patch request. Son was informed of the message below, he was not made aware of this finding before today. Son states the patient no longer sees any other physician and is requesting that Dr Tobias Castellanos refill the patch. Patient is currently experiencing lots of back pain. [facility] [address].    Lilibeth Dillon ph # [phone], okay per HIPAA

## 2022-03-09 NOTE — TELEPHONE ENCOUNTER
Routed to Dr. Matilde Ramos please advise on Lidocaine patch refill as requested--- patient was prescribed this on 11/22/21 by Dr. Fouzia Hannon. We have not filled this medication for her in the past.  Also please advise on Cilostazol refill request as well-- okay to refill x 1 year? Previous Rx was for 6 months. Thanks!

## 2022-03-16 ENCOUNTER — OFFICE VISIT (OUTPATIENT)
Dept: INTERNAL MEDICINE CLINIC | Facility: CLINIC | Age: 87
End: 2022-03-16
Payer: MEDICARE

## 2022-03-16 ENCOUNTER — LAB ENCOUNTER (OUTPATIENT)
Dept: LAB | Age: 87
End: 2022-03-16
Attending: INTERNAL MEDICINE
Payer: MEDICARE

## 2022-03-16 VITALS
BODY MASS INDEX: 29.44 KG/M2 | TEMPERATURE: 97 F | SYSTOLIC BLOOD PRESSURE: 102 MMHG | HEIGHT: 62 IN | OXYGEN SATURATION: 96 % | RESPIRATION RATE: 16 BRPM | DIASTOLIC BLOOD PRESSURE: 70 MMHG | WEIGHT: 160 LBS | HEART RATE: 106 BPM

## 2022-03-16 DIAGNOSIS — Z11.1 SCREENING FOR TUBERCULOSIS: ICD-10-CM

## 2022-03-16 DIAGNOSIS — M79.672 PAIN OF BOTH HEELS: ICD-10-CM

## 2022-03-16 DIAGNOSIS — R53.83 FATIGUE, UNSPECIFIED TYPE: ICD-10-CM

## 2022-03-16 DIAGNOSIS — M79.671 PAIN OF BOTH HEELS: ICD-10-CM

## 2022-03-16 DIAGNOSIS — E78.5 DYSLIPIDEMIA: ICD-10-CM

## 2022-03-16 DIAGNOSIS — I10 HYPERTENSION, UNSPECIFIED TYPE: ICD-10-CM

## 2022-03-16 DIAGNOSIS — I73.9 PAD (PERIPHERAL ARTERY DISEASE) (HCC): ICD-10-CM

## 2022-03-16 DIAGNOSIS — I10 ESSENTIAL HYPERTENSION: ICD-10-CM

## 2022-03-16 DIAGNOSIS — N28.89 LEFT RENAL MASS: ICD-10-CM

## 2022-03-16 DIAGNOSIS — Z23 NEED FOR VACCINATION: Primary | ICD-10-CM

## 2022-03-16 LAB
ALBUMIN SERPL-MCNC: 3.6 G/DL (ref 3.4–5)
ALBUMIN/GLOB SERPL: 1.2 {RATIO} (ref 1–2)
ALP LIVER SERPL-CCNC: 79 U/L
ALT SERPL-CCNC: 23 U/L
ANION GAP SERPL CALC-SCNC: 5 MMOL/L (ref 0–18)
AST SERPL-CCNC: 18 U/L (ref 15–37)
BASOPHILS # BLD AUTO: 0.07 X10(3) UL (ref 0–0.2)
BASOPHILS NFR BLD AUTO: 0.9 %
BILIRUB SERPL-MCNC: 0.6 MG/DL (ref 0.1–2)
BUN BLD-MCNC: 25 MG/DL (ref 7–18)
BUN/CREAT SERPL: 20.3 (ref 10–20)
CALCIUM BLD-MCNC: 9.7 MG/DL (ref 8.5–10.1)
CHLORIDE SERPL-SCNC: 108 MMOL/L (ref 98–112)
CO2 SERPL-SCNC: 27 MMOL/L (ref 21–32)
CREAT BLD-MCNC: 1.23 MG/DL
DEPRECATED RDW RBC AUTO: 47.4 FL (ref 35.1–46.3)
EOSINOPHIL NFR BLD AUTO: 3.9 %
ERYTHROCYTE [DISTWIDTH] IN BLOOD BY AUTOMATED COUNT: 13.2 % (ref 11–15)
FASTING STATUS PATIENT QL REPORTED: YES
GLOBULIN PLAS-MCNC: 3 G/DL (ref 2.8–4.4)
GLUCOSE BLD-MCNC: 98 MG/DL (ref 70–99)
HCT VFR BLD AUTO: 49 %
HGB BLD-MCNC: 15.5 G/DL
IMM GRANULOCYTES # BLD AUTO: 0.05 X10(3) UL (ref 0–1)
LYMPHOCYTES # BLD AUTO: 1.3 X10(3) UL (ref 1–4)
LYMPHOCYTES NFR BLD AUTO: 16.4 %
MCH RBC QN AUTO: 30.9 PG (ref 26–34)
MCHC RBC AUTO-ENTMCNC: 31.6 G/DL (ref 31–37)
MCV RBC AUTO: 97.6 FL
MONOCYTES # BLD AUTO: 0.55 X10(3) UL (ref 0.1–1)
MONOCYTES NFR BLD AUTO: 6.9 %
NEUTROPHILS # BLD AUTO: 5.64 X10 (3) UL (ref 1.5–7.7)
NEUTROPHILS # BLD AUTO: 5.64 X10(3) UL (ref 1.5–7.7)
NEUTROPHILS NFR BLD AUTO: 71.3 %
OSMOLALITY SERPL CALC.SUM OF ELEC: 294 MOSM/KG (ref 275–295)
PLATELET # BLD AUTO: 207 10(3)UL (ref 150–450)
POTASSIUM SERPL-SCNC: 4.8 MMOL/L (ref 3.5–5.1)
PROT SERPL-MCNC: 6.6 G/DL (ref 6.4–8.2)
RBC # BLD AUTO: 5.02 X10(6)UL
SODIUM SERPL-SCNC: 140 MMOL/L (ref 136–145)
TSI SER-ACNC: 1.47 MIU/ML (ref 0.36–3.74)
VIT B12 SERPL-MCNC: 576 PG/ML (ref 193–986)
WBC # BLD AUTO: 7.9 X10(3) UL (ref 4–11)

## 2022-03-16 PROCEDURE — 36415 COLL VENOUS BLD VENIPUNCTURE: CPT

## 2022-03-16 PROCEDURE — 99214 OFFICE O/P EST MOD 30 MIN: CPT | Performed by: INTERNAL MEDICINE

## 2022-03-16 PROCEDURE — 84443 ASSAY THYROID STIM HORMONE: CPT

## 2022-03-16 PROCEDURE — 85025 COMPLETE CBC W/AUTO DIFF WBC: CPT

## 2022-03-16 PROCEDURE — 82607 VITAMIN B-12: CPT

## 2022-03-16 PROCEDURE — 90715 TDAP VACCINE 7 YRS/> IM: CPT | Performed by: INTERNAL MEDICINE

## 2022-03-16 PROCEDURE — 90471 IMMUNIZATION ADMIN: CPT | Performed by: INTERNAL MEDICINE

## 2022-03-16 PROCEDURE — 86480 TB TEST CELL IMMUN MEASURE: CPT

## 2022-03-16 PROCEDURE — 80053 COMPREHEN METABOLIC PANEL: CPT

## 2022-03-17 ENCOUNTER — TELEPHONE (OUTPATIENT)
Dept: INTERNAL MEDICINE CLINIC | Facility: CLINIC | Age: 87
End: 2022-03-17

## 2022-03-17 NOTE — TELEPHONE ENCOUNTER
Please call Vicky Simth / [address]     to advise if patient will be continuing out patient physical/occupational therapy at [address]      [phone] ask for Vicky Smith or Christiano Alvarez

## 2022-03-17 NOTE — TELEPHONE ENCOUNTER
Left detailed message with Jaclyn Hernandez / [facility] LONG [facility] Therapist); relayed MD message below.  (OK per [facility] Verbal Release / Privacy)

## 2022-03-18 LAB
M TB IFN-G CD4+ T-CELLS BLD-ACNC: 0.05 IU/ML
M TB TUBERC IFN-G BLD QL: NEGATIVE
M TB TUBERC IGNF/MITOGEN IGNF CONTROL: >10 IU/ML
QFT TB1 AG MINUS NIL: 0 IU/ML
QFT TB2 AG MINUS NIL: -0.02 IU/ML

## 2022-03-18 RX ORDER — CILOSTAZOL 100 MG/1
100 TABLET ORAL 2 TIMES DAILY
Qty: 180 TABLET | Refills: 3 | Status: SHIPPED | OUTPATIENT
Start: 2022-03-18

## 2022-03-22 ENCOUNTER — TELEPHONE (OUTPATIENT)
Dept: INTERNAL MEDICINE CLINIC | Facility: CLINIC | Age: 87
End: 2022-03-22

## 2022-03-22 RX ORDER — CEPHALEXIN 500 MG/1
500 CAPSULE ORAL 2 TIMES DAILY
Qty: 14 CAPSULE | Refills: 0 | Status: SHIPPED | OUTPATIENT
Start: 2022-03-22

## 2022-03-22 NOTE — TELEPHONE ENCOUNTER
To Dr. Franko Nettles---    UTI symptoms:    [x]Frequency  [x]Urgency  []Pain/burning  []Blood in urine  []Low back pain  []Flank pain  []Fevers/chills  [x]Odor  []Confusion    NOTES:    Reports symptoms x1 week. UA and cx ordered per MD protocol and faxed to [facility] lab fax ([phone]); confirmation received. Please advise thanks!      Phone# for Shivam Esparza

## 2022-03-22 NOTE — TELEPHONE ENCOUNTER
Please encourage pt to push fluids; after a urine specimen has been submitted, she can start keflex bid x 7 days    Please have her call if symptoms are not better  Please also notify [address]

## 2022-03-22 NOTE — TELEPHONE ENCOUNTER
Pt. called asking to speak with Dr. Carmel Rea. She said she has a UTI. Urine has a bad odor. She has frequency with little output. No blood. No abdominal pain. Symptoms for over a week. Pt. knows there is something wrong. Pt. States she lives at [facility]. Hoping we can send UA order to the nurse there for her. Pt. Can be reached at [phone].

## 2022-03-22 NOTE — TELEPHONE ENCOUNTER
Attempted to call pt at home phone; line rang multiple times then busy.  Cell went straight to ; SHOLATCB

## 2022-03-23 NOTE — TELEPHONE ENCOUNTER
Received fax back from [facility] that, as they are not a medical facility, they do not have a lab and would recommend using [facility]. Lab orders for urinalysis and culture faxed to [facility] labs at [phone], confirmation received. Spoke with patient and relayed most of MD message, she asks that I speak with her son, Phillip Mitchell, as that would be easier for her. Informed patient that [facility] will be out to collect urine sample. Spoke with patient's son, Phillip Mitchell (OK per HIPAA), and relayed MD message and instructions. He verbalizes understanding and agrees with plan. Spoke with Summer at [facility], who sent us the fax, and informed her we sent the orders to [facility]. She tells me they usually come out fairly quickly and if they don't hear that they saw the patient by tomorrow morning they will let us know. To Dr. Perri Cardona-- patient asks if you have any comments or recommendations on why she is getting daily hot flashes, thanks!

## 2022-03-24 ENCOUNTER — TELEPHONE (OUTPATIENT)
Dept: INTERNAL MEDICINE CLINIC | Facility: CLINIC | Age: 87
End: 2022-03-24

## 2022-03-24 NOTE — TELEPHONE ENCOUNTER
Pt son Garry Frias returned call back. Advised pt son of Bruce Leventhal message listed. Pt son stated he spoke with Dr. Roxana Shi and advised her that the facility is now requesting for 12 months of  medical records which Dr. Roxana Shi advised that she would get the medical records and send  them to the living facility. Pt son stated that he would even pick them up or give a fax number to have them faxed over  which fax number is [phone].     If you have any questions please call Garry Frias at [phone]

## 2022-03-24 NOTE — TELEPHONE ENCOUNTER
Left message for patient's son Kirby Mckinley to call back. Per Adrienne Grade, patient was to move into an assisted living facility and she was awaiting paperwork from the facility but nothing has been received. Is that still the plan?  Need to ask Kirby Mckinley for an update

## 2022-03-25 NOTE — TELEPHONE ENCOUNTER
Spoke to Ham Berry (pt's son, [place] per HIPAA) who stated that his mother will be transferred from [facility] to [facility] (target transfer date is on 4/1/2022. [facility] is requesting OV fromt he last 12 months, labs, and Immunization record. ontact person is Soraya , fax . Will gather and fax info on Monday.

## 2022-03-28 NOTE — TELEPHONE ENCOUNTER
Mayra Barakat from the [facility] [address] [address] 1935 called to confirm she will need last 12 months of OV, lab results & medication list sent to her attn:  @ fax# [phone]

## 2022-03-28 NOTE — TELEPHONE ENCOUNTER
Spoke to [employer], [address] notes, Labs, Immunization records including result of Quantiferon gold results faxed to  and confirmation received.

## 2022-03-29 NOTE — TELEPHONE ENCOUNTER
Spoke to son Yelena Bills (Axel Castelan per HIPAA) and verified that all paper work needed was faxed to [facility] and that I spoke to [employer] personally. Also questioned whether mom had UTI, confirmed and that she is currently on Keflex for the UTI.

## 2022-03-30 ENCOUNTER — TELEPHONE (OUTPATIENT)
Dept: INTERNAL MEDICINE CLINIC | Facility: CLINIC | Age: 87
End: 2022-03-30

## 2022-03-30 NOTE — TELEPHONE ENCOUNTER
[facility] faxed 90 day prescription request for   Furosemide 20 mg  Qty - 54  Take 0.5 tablet (10 mg) by mouth see admin  Instructions.  Take 10 mg on Mon/Wed/Fri

## 2022-03-31 RX ORDER — FUROSEMIDE 20 MG/1
TABLET ORAL
Qty: 18 TABLET | Refills: 3 | Status: SHIPPED | OUTPATIENT
Start: 2022-03-31

## 2022-04-26 PROCEDURE — 99328 REST HOME NEW PATIENT LEVEL 5: CPT | Performed by: FAMILY MEDICINE

## 2022-05-17 PROCEDURE — 99335 REST HOME ESTAB PT LEVEL 2: CPT | Performed by: FAMILY MEDICINE

## 2022-06-07 PROCEDURE — 99336 REST HOME ESTAB PT LEVEL 3: CPT | Performed by: FAMILY MEDICINE

## 2022-06-24 NOTE — TELEPHONE ENCOUNTER
Pt is at nursing home now and pt is asking for physical therapy. A nurse Lorenzo Montiel will be calling as well pt is ok with us given her any info she might need. Please advise No

## 2022-09-15 ENCOUNTER — TELEPHONE (OUTPATIENT)
Dept: FAMILY MEDICINE | Age: 87
End: 2022-09-15

## 2022-10-25 PROCEDURE — 99336 REST HOME ESTAB PT LEVEL 3: CPT | Performed by: FAMILY MEDICINE

## 2022-11-04 ENCOUNTER — OFFICE VISIT (OUTPATIENT)
Dept: INTERNAL MEDICINE CLINIC | Facility: CLINIC | Age: 87
End: 2022-11-04
Payer: MEDICARE

## 2022-11-04 ENCOUNTER — LAB ENCOUNTER (OUTPATIENT)
Dept: LAB | Age: 87
End: 2022-11-04
Attending: INTERNAL MEDICINE
Payer: MEDICARE

## 2022-11-04 ENCOUNTER — TELEPHONE (OUTPATIENT)
Dept: INTERNAL MEDICINE CLINIC | Facility: CLINIC | Age: 87
End: 2022-11-04

## 2022-11-04 VITALS
WEIGHT: 130 LBS | DIASTOLIC BLOOD PRESSURE: 74 MMHG | BODY MASS INDEX: 23.92 KG/M2 | HEART RATE: 97 BPM | OXYGEN SATURATION: 95 % | SYSTOLIC BLOOD PRESSURE: 130 MMHG | HEIGHT: 62 IN | TEMPERATURE: 98 F

## 2022-11-04 DIAGNOSIS — R53.83 FATIGUE, UNSPECIFIED TYPE: ICD-10-CM

## 2022-11-04 DIAGNOSIS — I10 ESSENTIAL HYPERTENSION: ICD-10-CM

## 2022-11-04 DIAGNOSIS — I10 ESSENTIAL HYPERTENSION: Primary | ICD-10-CM

## 2022-11-04 DIAGNOSIS — E78.5 DYSLIPIDEMIA: ICD-10-CM

## 2022-11-04 DIAGNOSIS — I87.2 VENOUS INSUFFICIENCY: ICD-10-CM

## 2022-11-04 LAB
ALBUMIN SERPL-MCNC: 3.7 G/DL (ref 3.4–5)
ALBUMIN/GLOB SERPL: 1.2 {RATIO} (ref 1–2)
ALP LIVER SERPL-CCNC: 81 U/L
ALT SERPL-CCNC: 24 U/L
ANION GAP SERPL CALC-SCNC: 9 MMOL/L (ref 0–18)
AST SERPL-CCNC: 22 U/L (ref 15–37)
BASOPHILS # BLD AUTO: 0.08 X10(3) UL (ref 0–0.2)
BASOPHILS NFR BLD AUTO: 1.1 %
BILIRUB SERPL-MCNC: 0.8 MG/DL (ref 0.1–2)
BUN BLD-MCNC: 17 MG/DL (ref 7–18)
BUN/CREAT SERPL: 15 (ref 10–20)
CALCIUM BLD-MCNC: 9.4 MG/DL (ref 8.5–10.1)
CHLORIDE SERPL-SCNC: 107 MMOL/L (ref 98–112)
CHOLEST SERPL-MCNC: 162 MG/DL (ref ?–200)
CO2 SERPL-SCNC: 26 MMOL/L (ref 21–32)
CREAT BLD-MCNC: 1.13 MG/DL
DEPRECATED RDW RBC AUTO: 48.8 FL (ref 35.1–46.3)
EOSINOPHIL # BLD AUTO: 0.33 X10(3) UL (ref 0–0.7)
EOSINOPHIL NFR BLD AUTO: 4.4 %
ERYTHROCYTE [DISTWIDTH] IN BLOOD BY AUTOMATED COUNT: 13.8 % (ref 11–15)
FASTING PATIENT LIPID ANSWER: NO
FASTING STATUS PATIENT QL REPORTED: NO
GFR SERPLBLD BASED ON 1.73 SQ M-ARVRAT: 47 ML/MIN/1.73M2 (ref 60–?)
GLOBULIN PLAS-MCNC: 3.2 G/DL (ref 2.8–4.4)
GLUCOSE BLD-MCNC: 96 MG/DL (ref 70–99)
HCT VFR BLD AUTO: 44.4 %
HDLC SERPL-MCNC: 101 MG/DL (ref 40–59)
HGB BLD-MCNC: 14.4 G/DL
IMM GRANULOCYTES # BLD AUTO: 0.02 X10(3) UL (ref 0–1)
IMM GRANULOCYTES NFR BLD: 0.3 %
LDLC SERPL CALC-MCNC: 46 MG/DL (ref ?–100)
LYMPHOCYTES # BLD AUTO: 1.15 X10(3) UL (ref 1–4)
LYMPHOCYTES NFR BLD AUTO: 15.5 %
MCH RBC QN AUTO: 31 PG (ref 26–34)
MCHC RBC AUTO-ENTMCNC: 32.4 G/DL (ref 31–37)
MCV RBC AUTO: 95.7 FL
MONOCYTES # BLD AUTO: 0.48 X10(3) UL (ref 0.1–1)
MONOCYTES NFR BLD AUTO: 6.5 %
NEUTROPHILS # BLD AUTO: 5.36 X10 (3) UL (ref 1.5–7.7)
NEUTROPHILS # BLD AUTO: 5.36 X10(3) UL (ref 1.5–7.7)
NEUTROPHILS NFR BLD AUTO: 72.2 %
NONHDLC SERPL-MCNC: 61 MG/DL (ref ?–130)
OSMOLALITY SERPL CALC.SUM OF ELEC: 295 MOSM/KG (ref 275–295)
PLATELET # BLD AUTO: 186 10(3)UL (ref 150–450)
POTASSIUM SERPL-SCNC: 4.1 MMOL/L (ref 3.5–5.1)
PROT SERPL-MCNC: 6.9 G/DL (ref 6.4–8.2)
RBC # BLD AUTO: 4.64 X10(6)UL
SODIUM SERPL-SCNC: 142 MMOL/L (ref 136–145)
TRIGL SERPL-MCNC: 85 MG/DL (ref 30–149)
TSI SER-ACNC: 1.66 MIU/ML (ref 0.36–3.74)
VLDLC SERPL CALC-MCNC: 12 MG/DL (ref 0–30)
WBC # BLD AUTO: 7.4 X10(3) UL (ref 4–11)

## 2022-11-04 PROCEDURE — 80053 COMPREHEN METABOLIC PANEL: CPT

## 2022-11-04 PROCEDURE — 85025 COMPLETE CBC W/AUTO DIFF WBC: CPT

## 2022-11-04 PROCEDURE — 36415 COLL VENOUS BLD VENIPUNCTURE: CPT

## 2022-11-04 PROCEDURE — 99214 OFFICE O/P EST MOD 30 MIN: CPT | Performed by: INTERNAL MEDICINE

## 2022-11-04 PROCEDURE — 84443 ASSAY THYROID STIM HORMONE: CPT

## 2022-11-04 PROCEDURE — 1126F AMNT PAIN NOTED NONE PRSNT: CPT | Performed by: INTERNAL MEDICINE

## 2022-11-04 PROCEDURE — 80061 LIPID PANEL: CPT

## 2022-11-04 NOTE — TELEPHONE ENCOUNTER
Pt was seen today in office. Dr. King Gonsalez requested that we fax written orders to [facility] where pt lives. Orders written and signed by Dr. Zoya Shabazz. Orders from Dr. King Gonsalez included below:     1. Increase lasix to full tablet (20mg) daily    2. Check BMP next Wednesday 11/9. Please fax results to 21 [phone]. 3. Keep legs elevated when possible; use maciej hose during daytime if tolerated. Orders above faxed to [facility], Feliz Cantu at [phone]. Received fax failure x2. [facility] at [phone] and was provided with alternate number [phone]. Faxed, confirmation received. Written order sent to scan.

## 2022-11-07 ENCOUNTER — TELEPHONE (OUTPATIENT)
Dept: INTERNAL MEDICINE CLINIC | Facility: CLINIC | Age: 87
End: 2022-11-07

## 2022-11-07 NOTE — TELEPHONE ENCOUNTER
Attempted to call son josé miguel cox 3 times this morning but phone line rings a busy tone , will attempt again later today, could not leave vm for him to call back.

## 2022-11-07 NOTE — TELEPHONE ENCOUNTER
Spoke to josé miguel and advised per MD message below, Sadia Rivers stated he would call once he sees her tomorrow with an update.

## 2022-11-07 NOTE — TELEPHONE ENCOUNTER
Please notify pt's son Yeny Cornell that blood tests look stable---i'd like to hear about the leg swelling later this week whenever he stops in to see her.

## 2022-11-16 ENCOUNTER — MED REC SCAN ONLY (OUTPATIENT)
Dept: INTERNAL MEDICINE CLINIC | Facility: CLINIC | Age: 87
End: 2022-11-16

## 2022-12-27 PROCEDURE — 99335 REST HOME ESTAB PT LEVEL 2: CPT | Performed by: FAMILY MEDICINE

## 2023-01-04 ENCOUNTER — APPOINTMENT (OUTPATIENT)
Dept: GENERAL RADIOLOGY | Age: 88
End: 2023-01-04

## 2023-01-04 ENCOUNTER — HOSPITAL ENCOUNTER (EMERGENCY)
Age: 88
Discharge: HOME | End: 2023-01-04
Attending: EMERGENCY MEDICINE

## 2023-01-04 VITALS
DIASTOLIC BLOOD PRESSURE: 92 MMHG | WEIGHT: 172.4 LBS | OXYGEN SATURATION: 95 % | TEMPERATURE: 98.1 F | HEART RATE: 95 BPM | RESPIRATION RATE: 18 BRPM | HEIGHT: 62 IN | BODY MASS INDEX: 31.73 KG/M2 | SYSTOLIC BLOOD PRESSURE: 158 MMHG

## 2023-01-04 DIAGNOSIS — R05.9 COUGH, UNSPECIFIED TYPE: Primary | ICD-10-CM

## 2023-01-04 LAB
ALBUMIN SERPL-MCNC: 3.3 G/DL (ref 3.6–5.1)
ALBUMIN/GLOB SERPL: 1.1 {RATIO} (ref 1–2.4)
ALP SERPL-CCNC: 67 UNITS/L (ref 45–117)
ALT SERPL-CCNC: 17 UNITS/L
ANION GAP SERPL CALC-SCNC: 9 MMOL/L (ref 7–19)
AST SERPL-CCNC: 14 UNITS/L
ATRIAL RATE (BPM): 97
BASOPHILS # BLD: 0 K/MCL (ref 0–0.3)
BASOPHILS NFR BLD: 1 %
BILIRUB SERPL-MCNC: 0.9 MG/DL (ref 0.2–1)
BUN SERPL-MCNC: 21 MG/DL (ref 6–20)
BUN/CREAT SERPL: 19 (ref 7–25)
CALCIUM SERPL-MCNC: 9.4 MG/DL (ref 8.4–10.2)
CHLORIDE SERPL-SCNC: 110 MMOL/L (ref 97–110)
CO2 SERPL-SCNC: 28 MMOL/L (ref 21–32)
CREAT SERPL-MCNC: 1.11 MG/DL (ref 0.51–0.95)
DEPRECATED RDW RBC: 50.2 FL (ref 39–50)
EOSINOPHIL # BLD: 0.3 K/MCL (ref 0–0.5)
EOSINOPHIL NFR BLD: 3 %
ERYTHROCYTE [DISTWIDTH] IN BLOOD: 14.3 % (ref 11–15)
FASTING DURATION TIME PATIENT: ABNORMAL H
FLUAV RNA RESP QL NAA+PROBE: NOT DETECTED
FLUBV RNA RESP QL NAA+PROBE: NOT DETECTED
GFR SERPLBLD BASED ON 1.73 SQ M-ARVRAT: 48 ML/MIN
GLOBULIN SER-MCNC: 3 G/DL (ref 2–4)
GLUCOSE SERPL-MCNC: 105 MG/DL (ref 70–99)
HCT VFR BLD CALC: 39.1 % (ref 36–46.5)
HGB BLD-MCNC: 13.1 G/DL (ref 12–15.5)
IMM GRANULOCYTES # BLD AUTO: 0 K/MCL (ref 0–0.2)
IMM GRANULOCYTES # BLD: 0 %
LACTATE BLDV-SCNC: 1.9 MMOL/L (ref 0–2)
LYMPHOCYTES # BLD: 0.7 K/MCL (ref 1–4)
LYMPHOCYTES NFR BLD: 9 %
MCH RBC QN AUTO: 32.1 PG (ref 26–34)
MCHC RBC AUTO-ENTMCNC: 33.5 G/DL (ref 32–36.5)
MCV RBC AUTO: 95.8 FL (ref 78–100)
MONOCYTES # BLD: 0.6 K/MCL (ref 0.3–0.9)
MONOCYTES NFR BLD: 8 %
NEUTROPHILS # BLD: 6.6 K/MCL (ref 1.8–7.7)
NEUTROPHILS NFR BLD: 79 %
NRBC BLD MANUAL-RTO: 0 /100 WBC
NT-PROBNP SERPL-MCNC: 392 PG/ML
P AXIS (DEGREES): 48
PLATELET # BLD AUTO: 157 K/MCL (ref 140–450)
POTASSIUM SERPL-SCNC: 4.4 MMOL/L (ref 3.4–5.1)
PR-INTERVAL (MSEC): 142
PROCALCITONIN SERPL IA-MCNC: <0.05 NG/ML
PROT SERPL-MCNC: 6.3 G/DL (ref 6.4–8.2)
QRS-INTERVAL (MSEC): 92
QT-INTERVAL (MSEC): 390
QTC: 493
R AXIS (DEGREES): 63
RBC # BLD: 4.08 MIL/MCL (ref 4–5.2)
REPORT TEXT: NORMAL
RSV AG NPH QL IA.RAPID: NOT DETECTED
SARS-COV-2 RNA RESP QL NAA+PROBE: NOT DETECTED
SERVICE CMNT-IMP: NORMAL
SERVICE CMNT-IMP: NORMAL
SODIUM SERPL-SCNC: 143 MMOL/L (ref 135–145)
T AXIS (DEGREES): 54
TROPONIN I SERPL DL<=0.01 NG/ML-MCNC: 12 NG/L
VENTRICULAR RATE EKG/MIN (BPM): 96
WBC # BLD: 8.3 K/MCL (ref 4.2–11)

## 2023-01-04 PROCEDURE — 99285 EMERGENCY DEPT VISIT HI MDM: CPT | Performed by: EMERGENCY MEDICINE

## 2023-01-04 PROCEDURE — 93010 ELECTROCARDIOGRAM REPORT: CPT | Performed by: INTERNAL MEDICINE

## 2023-01-04 PROCEDURE — 36415 COLL VENOUS BLD VENIPUNCTURE: CPT

## 2023-01-04 PROCEDURE — 83880 ASSAY OF NATRIURETIC PEPTIDE: CPT | Performed by: NURSE PRACTITIONER

## 2023-01-04 PROCEDURE — 99285 EMERGENCY DEPT VISIT HI MDM: CPT

## 2023-01-04 PROCEDURE — 93005 ELECTROCARDIOGRAM TRACING: CPT | Performed by: NURSE PRACTITIONER

## 2023-01-04 PROCEDURE — 0241U COVID/FLU/RSV PANEL: CPT | Performed by: NURSE PRACTITIONER

## 2023-01-04 PROCEDURE — 83605 ASSAY OF LACTIC ACID: CPT | Performed by: NURSE PRACTITIONER

## 2023-01-04 PROCEDURE — 87040 BLOOD CULTURE FOR BACTERIA: CPT | Performed by: NURSE PRACTITIONER

## 2023-01-04 PROCEDURE — 71045 X-RAY EXAM CHEST 1 VIEW: CPT

## 2023-01-04 PROCEDURE — 84484 ASSAY OF TROPONIN QUANT: CPT | Performed by: NURSE PRACTITIONER

## 2023-01-04 PROCEDURE — 84145 PROCALCITONIN (PCT): CPT | Performed by: NURSE PRACTITIONER

## 2023-01-04 PROCEDURE — C9803 HOPD COVID-19 SPEC COLLECT: HCPCS

## 2023-01-04 PROCEDURE — 85025 COMPLETE CBC W/AUTO DIFF WBC: CPT | Performed by: NURSE PRACTITIONER

## 2023-01-04 PROCEDURE — 80053 COMPREHEN METABOLIC PANEL: CPT | Performed by: NURSE PRACTITIONER

## 2023-01-04 ASSESSMENT — ENCOUNTER SYMPTOMS
SHORTNESS OF BREATH: 0
SINUS PAIN: 0
CHEST TIGHTNESS: 0
EYE REDNESS: 0
ACTIVITY CHANGE: 0
SORE THROAT: 0
DIARRHEA: 0
EYES NEGATIVE: 1
ABDOMINAL DISTENTION: 0
LIGHT-HEADEDNESS: 0
PSYCHIATRIC NEGATIVE: 1
NERVOUS/ANXIOUS: 0
NAUSEA: 0
EYE PAIN: 0
WOUND: 1
COLOR CHANGE: 0
COUGH: 1
FATIGUE: 0
NEUROLOGICAL NEGATIVE: 1
WEAKNESS: 0
ENDOCRINE NEGATIVE: 1
ABDOMINAL PAIN: 0
VOMITING: 0
AGITATION: 0
HEADACHES: 0
DIZZINESS: 0
CHILLS: 1
SHORTNESS OF BREATH: 1
RHINORRHEA: 0
CHILLS: 0
HEMATOLOGIC/LYMPHATIC NEGATIVE: 1
BACK PAIN: 0
SINUS PRESSURE: 0
FEVER: 0
HALLUCINATIONS: 0
ALLERGIC/IMMUNOLOGIC NEGATIVE: 1
CONSTIPATION: 0

## 2023-01-04 ASSESSMENT — PAIN SCALES - GENERAL: PAINLEVEL_OUTOF10: 0

## 2023-01-09 LAB
BACTERIA BLD CULT: NORMAL
BACTERIA BLD CULT: NORMAL

## 2023-02-16 ENCOUNTER — TELEPHONE (OUTPATIENT)
Dept: FAMILY MEDICINE | Age: 88
End: 2023-02-16

## 2023-03-10 ENCOUNTER — OFFICE VISIT (OUTPATIENT)
Dept: INTERNAL MEDICINE CLINIC | Facility: CLINIC | Age: 88
End: 2023-03-10

## 2023-03-10 ENCOUNTER — TELEPHONE (OUTPATIENT)
Dept: INTERNAL MEDICINE CLINIC | Facility: CLINIC | Age: 88
End: 2023-03-10

## 2023-03-10 VITALS
HEART RATE: 78 BPM | OXYGEN SATURATION: 98 % | WEIGHT: 161.38 LBS | SYSTOLIC BLOOD PRESSURE: 138 MMHG | BODY MASS INDEX: 30 KG/M2 | DIASTOLIC BLOOD PRESSURE: 64 MMHG | RESPIRATION RATE: 16 BRPM | TEMPERATURE: 97 F

## 2023-03-10 DIAGNOSIS — I10 HYPERTENSION, UNSPECIFIED TYPE: ICD-10-CM

## 2023-03-10 DIAGNOSIS — M79.605 PAIN IN BOTH LOWER EXTREMITIES: ICD-10-CM

## 2023-03-10 DIAGNOSIS — M79.604 PAIN IN BOTH LOWER EXTREMITIES: ICD-10-CM

## 2023-03-10 DIAGNOSIS — I73.9 CLAUDICATION IN PERIPHERAL VASCULAR DISEASE (HCC): ICD-10-CM

## 2023-03-10 DIAGNOSIS — E78.5 DYSLIPIDEMIA: ICD-10-CM

## 2023-03-10 DIAGNOSIS — I10 ESSENTIAL HYPERTENSION: Primary | ICD-10-CM

## 2023-03-10 DIAGNOSIS — J44.9 CHRONIC OBSTRUCTIVE PULMONARY DISEASE, UNSPECIFIED COPD TYPE (HCC): ICD-10-CM

## 2023-03-10 DIAGNOSIS — I87.2 VENOUS INSUFFICIENCY: ICD-10-CM

## 2023-03-10 DIAGNOSIS — I73.9 PAD (PERIPHERAL ARTERY DISEASE) (HCC): ICD-10-CM

## 2023-03-10 DIAGNOSIS — M79.89 LEG SWELLING: ICD-10-CM

## 2023-03-10 RX ORDER — QUETIAPINE FUMARATE 50 MG/1
TABLET, FILM COATED ORAL
Qty: 135 TABLET | Refills: 3 | Status: SHIPPED
Start: 2023-03-10

## 2023-03-10 RX ORDER — POTASSIUM CHLORIDE 1500 MG/1
20 TABLET, FILM COATED, EXTENDED RELEASE ORAL DAILY
Qty: 90 TABLET | Refills: 0 | Status: SHIPPED
Start: 2023-03-10 | End: 2023-04-09

## 2023-03-10 RX ORDER — FUROSEMIDE 40 MG/1
40 TABLET ORAL DAILY
Qty: 90 TABLET | Refills: 0 | Status: SHIPPED
Start: 2023-03-10 | End: 2024-03-04

## 2023-03-10 NOTE — TELEPHONE ENCOUNTER
New Prescription faxed to pt's assisted living facility at [facility] and faxed to [phone] with confirmation received. To med rec scanning.

## 2023-03-11 ENCOUNTER — HOSPITAL ENCOUNTER (OUTPATIENT)
Dept: ULTRASOUND IMAGING | Facility: HOSPITAL | Age: 88
Discharge: HOME | End: 2023-03-11
Attending: INTERNAL MEDICINE
Payer: MEDICARE

## 2023-03-11 DIAGNOSIS — M79.605 PAIN IN BOTH LOWER EXTREMITIES: ICD-10-CM

## 2023-03-11 DIAGNOSIS — M79.89 LEG SWELLING: ICD-10-CM

## 2023-03-11 DIAGNOSIS — M79.604 PAIN IN BOTH LOWER EXTREMITIES: ICD-10-CM

## 2023-03-11 PROCEDURE — 93970 EXTREMITY STUDY: CPT | Performed by: INTERNAL MEDICINE

## 2023-03-31 ENCOUNTER — HOSPITAL ENCOUNTER (OUTPATIENT)
Dept: CT IMAGING | Facility: HOSPITAL | Age: 88
Discharge: HOME | End: 2023-03-31
Attending: INTERNAL MEDICINE
Payer: MEDICARE

## 2023-03-31 DIAGNOSIS — I73.9 PAD (PERIPHERAL ARTERY DISEASE) (HCC): ICD-10-CM

## 2023-03-31 DIAGNOSIS — I73.9 CLAUDICATION IN PERIPHERAL VASCULAR DISEASE (HCC): ICD-10-CM

## 2023-03-31 LAB
CREAT BLD-MCNC: 1.3 MG/DL
GFR SERPLBLD BASED ON 1.73 SQ M-ARVRAT: 40 ML/MIN/1.73M2 (ref 60–?)

## 2023-03-31 PROCEDURE — 75635 CT ANGIO ABDOMINAL ARTERIES: CPT | Performed by: INTERNAL MEDICINE

## 2023-03-31 PROCEDURE — 82565 ASSAY OF CREATININE: CPT

## 2023-04-05 ENCOUNTER — VIRTUAL PHONE E/M (OUTPATIENT)
Dept: INTERNAL MEDICINE CLINIC | Facility: CLINIC | Age: 88
End: 2023-04-05

## 2023-04-05 DIAGNOSIS — I73.9 PAD (PERIPHERAL ARTERY DISEASE) (HCC): ICD-10-CM

## 2023-04-05 DIAGNOSIS — M79.89 LEG SWELLING: Primary | ICD-10-CM

## 2023-04-05 PROCEDURE — 99441 PHONE E/M BY PHYS 5-10 MIN: CPT | Performed by: INTERNAL MEDICINE

## 2023-04-05 NOTE — PROGRESS NOTES
Virtual Telephone Check-In    Reggie Brady verbally consents to a Virtual/Telephone Check-In visit on 04/05/23. Patient has been referred to the [facility] website at [email or web address] to review the yearly Consent to Treat document. Patient understands and accepts financial responsibility for any deductible, co-insurance and/or co-pays associated with this service. Duration of the service: 10 minutes      Summary of topics discussed: leg swelling    Video utilized for this visit    Leg swelling is much improved  Blister on left foot has resolved  Denies any pain in the legs    Plan:  1. Leg edema  Now resolved; continue tubigrips for now    2. PAD  Will have Dr. David Sykes review results next week of her CTA; pt is currently asymptomatic and foot blister has resolved    3. Kidney stone  Has appt with Dr. David Miranda tomorrow    4.  Bladder enhancement noted on imaging  Has appt with Dr. Gumaro Parks, DO

## 2023-04-19 RX ORDER — AMOXICILLIN AND CLAVULANATE POTASSIUM 875; 125 MG/1; MG/1
1 TABLET, FILM COATED ORAL 2 TIMES DAILY
COMMUNITY

## 2023-04-19 RX ORDER — ACETAMINOPHEN 325 MG/1
325 TABLET ORAL EVERY 6 HOURS PRN
COMMUNITY

## 2023-04-19 RX ORDER — AMOXICILLIN AND CLAVULANATE POTASSIUM 500; 125 MG/1; MG/1
1 TABLET, FILM COATED ORAL 2 TIMES DAILY
COMMUNITY
End: 2023-04-19 | Stop reason: ALTCHOICE

## 2023-04-20 NOTE — PAT NURSING NOTE
A call back from Stormy Amaral from Dr Elbert Bhat [facility] re: pt didn't stop her Aspirin, last dose was 4/19/23 as verified by the PAT nurse during phone interview. Per Stormy Amaral MD aware and ok to proceed as scheduled.

## 2023-04-21 ENCOUNTER — APPOINTMENT (OUTPATIENT)
Dept: GENERAL RADIOLOGY | Facility: HOSPITAL | Age: 88
End: 2023-04-21
Attending: UROLOGY
Payer: MEDICARE

## 2023-04-21 ENCOUNTER — HOSPITAL ENCOUNTER (OUTPATIENT)
Facility: HOSPITAL | Age: 88
Setting detail: HOSPITAL OUTPATIENT SURGERY
Discharge: HOME | End: 2023-04-21
Attending: UROLOGY | Admitting: UROLOGY
Payer: MEDICARE

## 2023-04-21 VITALS
OXYGEN SATURATION: 93 % | TEMPERATURE: 99 F | SYSTOLIC BLOOD PRESSURE: 131 MMHG | RESPIRATION RATE: 16 BRPM | HEIGHT: 62 IN | BODY MASS INDEX: 27.6 KG/M2 | HEART RATE: 91 BPM | DIASTOLIC BLOOD PRESSURE: 64 MMHG | WEIGHT: 150 LBS

## 2023-04-21 PROCEDURE — 94799 UNLISTED PULMONARY SVC/PX: CPT

## 2023-04-21 PROCEDURE — 94640 AIRWAY INHALATION TREATMENT: CPT

## 2023-04-21 RX ORDER — SODIUM CHLORIDE, SODIUM LACTATE, POTASSIUM CHLORIDE, CALCIUM CHLORIDE 600; 310; 30; 20 MG/100ML; MG/100ML; MG/100ML; MG/100ML
INJECTION, SOLUTION INTRAVENOUS CONTINUOUS
OUTPATIENT
Start: 2023-04-21

## 2023-04-21 RX ORDER — ACETAMINOPHEN 500 MG
1000 TABLET ORAL ONCE
Status: COMPLETED | OUTPATIENT
Start: 2023-04-21 | End: 2023-04-21

## 2023-04-21 RX ORDER — IPRATROPIUM BROMIDE AND ALBUTEROL SULFATE 2.5; .5 MG/3ML; MG/3ML
3 SOLUTION RESPIRATORY (INHALATION) ONCE
Status: COMPLETED | OUTPATIENT
Start: 2023-04-21 | End: 2023-04-21

## 2023-04-21 RX ORDER — HYDROMORPHONE HYDROCHLORIDE 1 MG/ML
0.2 INJECTION, SOLUTION INTRAMUSCULAR; INTRAVENOUS; SUBCUTANEOUS EVERY 5 MIN PRN
OUTPATIENT
Start: 2023-04-21

## 2023-04-21 RX ORDER — MORPHINE SULFATE 2 MG/ML
2 INJECTION, SOLUTION INTRAMUSCULAR; INTRAVENOUS EVERY 10 MIN PRN
OUTPATIENT
Start: 2023-04-21

## 2023-04-21 RX ORDER — HYDROMORPHONE HYDROCHLORIDE 1 MG/ML
0.6 INJECTION, SOLUTION INTRAMUSCULAR; INTRAVENOUS; SUBCUTANEOUS EVERY 5 MIN PRN
OUTPATIENT
Start: 2023-04-21

## 2023-04-21 RX ORDER — HYDROMORPHONE HYDROCHLORIDE 1 MG/ML
0.4 INJECTION, SOLUTION INTRAMUSCULAR; INTRAVENOUS; SUBCUTANEOUS EVERY 5 MIN PRN
OUTPATIENT
Start: 2023-04-21

## 2023-04-21 RX ORDER — MORPHINE SULFATE 4 MG/ML
4 INJECTION, SOLUTION INTRAMUSCULAR; INTRAVENOUS EVERY 10 MIN PRN
OUTPATIENT
Start: 2023-04-21

## 2023-04-21 RX ORDER — NALOXONE HYDROCHLORIDE 0.4 MG/ML
80 INJECTION, SOLUTION INTRAMUSCULAR; INTRAVENOUS; SUBCUTANEOUS AS NEEDED
OUTPATIENT
Start: 2023-04-21 | End: 2023-04-21

## 2023-04-21 RX ORDER — IPRATROPIUM BROMIDE AND ALBUTEROL SULFATE 2.5; .5 MG/3ML; MG/3ML
SOLUTION RESPIRATORY (INHALATION)
Status: DISCONTINUED
Start: 2023-04-21 | End: 2023-04-21

## 2023-04-21 RX ORDER — SODIUM CHLORIDE, SODIUM LACTATE, POTASSIUM CHLORIDE, CALCIUM CHLORIDE 600; 310; 30; 20 MG/100ML; MG/100ML; MG/100ML; MG/100ML
INJECTION, SOLUTION INTRAVENOUS CONTINUOUS
Status: DISCONTINUED | OUTPATIENT
Start: 2023-04-21 | End: 2023-04-21

## 2023-04-21 RX ORDER — MORPHINE SULFATE 10 MG/ML
6 INJECTION, SOLUTION INTRAMUSCULAR; INTRAVENOUS EVERY 10 MIN PRN
OUTPATIENT
Start: 2023-04-21

## 2023-05-19 ENCOUNTER — APPOINTMENT (OUTPATIENT)
Dept: GENERAL RADIOLOGY | Facility: HOSPITAL | Age: 88
End: 2023-05-19
Attending: INTERNAL MEDICINE
Payer: MEDICARE

## 2023-05-19 ENCOUNTER — HOSPITAL ENCOUNTER (INPATIENT)
Facility: HOSPITAL | Age: 88
LOS: 1 days | Discharge: HOME | End: 2023-05-21
Attending: UROLOGY | Admitting: UROLOGY
Payer: MEDICARE

## 2023-05-19 ENCOUNTER — APPOINTMENT (OUTPATIENT)
Dept: GENERAL RADIOLOGY | Facility: HOSPITAL | Age: 88
DRG: 659 | End: 2023-05-19
Attending: UROLOGY
Payer: MEDICARE

## 2023-05-19 ENCOUNTER — ANESTHESIA EVENT (OUTPATIENT)
Dept: SURGERY | Facility: HOSPITAL | Age: 88
End: 2023-05-19
Payer: MEDICARE

## 2023-05-19 ENCOUNTER — APPOINTMENT (OUTPATIENT)
Dept: GENERAL RADIOLOGY | Facility: HOSPITAL | Age: 88
DRG: 659 | End: 2023-05-19
Attending: INTERNAL MEDICINE
Payer: MEDICARE

## 2023-05-19 ENCOUNTER — APPOINTMENT (OUTPATIENT)
Dept: GENERAL RADIOLOGY | Facility: HOSPITAL | Age: 88
End: 2023-05-19
Attending: UROLOGY
Payer: MEDICARE

## 2023-05-19 ENCOUNTER — ANESTHESIA (OUTPATIENT)
Dept: SURGERY | Facility: HOSPITAL | Age: 88
End: 2023-05-19
Payer: MEDICARE

## 2023-05-19 ENCOUNTER — HOSPITAL ENCOUNTER (INPATIENT)
Facility: HOSPITAL | Age: 88
LOS: 1 days | Discharge: HOME | DRG: 659 | End: 2023-05-21
Attending: UROLOGY | Admitting: UROLOGY
Payer: MEDICARE

## 2023-05-19 PROBLEM — G89.18 POST-OP PAIN: Status: ACTIVE | Noted: 2023-05-19

## 2023-05-19 LAB
ALBUMIN SERPL-MCNC: 2.7 G/DL (ref 3.4–5)
ALBUMIN/GLOB SERPL: 1 {RATIO} (ref 1–2)
ALP LIVER SERPL-CCNC: 59 U/L
ALT SERPL-CCNC: 24 U/L
ANION GAP SERPL CALC-SCNC: 6 MMOL/L (ref 0–18)
AST SERPL-CCNC: 29 U/L (ref 15–37)
BILIRUB SERPL-MCNC: 0.4 MG/DL (ref 0.1–2)
BUN BLD-MCNC: 19 MG/DL (ref 7–18)
BUN/CREAT SERPL: 13.8 (ref 10–20)
CALCIUM BLD-MCNC: 8.7 MG/DL (ref 8.5–10.1)
CHLORIDE SERPL-SCNC: 109 MMOL/L (ref 98–112)
CO2 SERPL-SCNC: 24 MMOL/L (ref 21–32)
CREAT BLD-MCNC: 1.38 MG/DL
GFR SERPLBLD BASED ON 1.73 SQ M-ARVRAT: 37 ML/MIN/1.73M2 (ref 60–?)
GLOBULIN PLAS-MCNC: 2.7 G/DL (ref 2.8–4.4)
GLUCOSE BLD-MCNC: 152 MG/DL (ref 70–99)
OSMOLALITY SERPL CALC.SUM OF ELEC: 293 MOSM/KG (ref 275–295)
POTASSIUM SERPL-SCNC: 4.2 MMOL/L (ref 3.5–5.1)
PROT SERPL-MCNC: 5.4 G/DL (ref 6.4–8.2)
SARS-COV-2 RNA RESP QL NAA+PROBE: NOT DETECTED
SODIUM SERPL-SCNC: 139 MMOL/L (ref 136–145)

## 2023-05-19 PROCEDURE — 0TC68ZZ EXTIRPATION OF MATTER FROM RIGHT URETER, VIA NATURAL OR ARTIFICIAL OPENING ENDOSCOPIC: ICD-10-PCS | Performed by: UROLOGY

## 2023-05-19 PROCEDURE — BT1D1ZZ FLUOROSCOPY OF RIGHT KIDNEY, URETER AND BLADDER USING LOW OSMOLAR CONTRAST: ICD-10-PCS | Performed by: UROLOGY

## 2023-05-19 PROCEDURE — BT141ZZ FLUOROSCOPY OF KIDNEYS, URETERS AND BLADDER USING LOW OSMOLAR CONTRAST: ICD-10-PCS | Performed by: UROLOGY

## 2023-05-19 PROCEDURE — 0T768DZ DILATION OF RIGHT URETER WITH INTRALUMINAL DEVICE, VIA NATURAL OR ARTIFICIAL OPENING ENDOSCOPIC: ICD-10-PCS | Performed by: UROLOGY

## 2023-05-19 PROCEDURE — 99223 1ST HOSP IP/OBS HIGH 75: CPT | Performed by: INTERNAL MEDICINE

## 2023-05-19 PROCEDURE — 71045 X-RAY EXAM CHEST 1 VIEW: CPT | Performed by: INTERNAL MEDICINE

## 2023-05-19 DEVICE — URETERAL STENT
Type: IMPLANTABLE DEVICE | Site: URETER | Status: FUNCTIONAL
Brand: ASCERTA™

## 2023-05-19 RX ORDER — IPRATROPIUM BROMIDE AND ALBUTEROL SULFATE 2.5; .5 MG/3ML; MG/3ML
3 SOLUTION RESPIRATORY (INHALATION) ONCE
Status: COMPLETED | OUTPATIENT
Start: 2023-05-19 | End: 2023-05-19

## 2023-05-19 RX ORDER — EZETIMIBE 10 MG/1
10 TABLET ORAL DAILY
Status: DISCONTINUED | OUTPATIENT
Start: 2023-05-19 | End: 2023-05-21

## 2023-05-19 RX ORDER — LIDOCAINE HYDROCHLORIDE 10 MG/ML
INJECTION, SOLUTION EPIDURAL; INFILTRATION; INTRACAUDAL; PERINEURAL AS NEEDED
Status: DISCONTINUED | OUTPATIENT
Start: 2023-05-19 | End: 2023-05-19 | Stop reason: SURG

## 2023-05-19 RX ORDER — CEFTRIAXONE 1 G/1
1 INJECTION, POWDER, FOR SOLUTION INTRAMUSCULAR; INTRAVENOUS
Status: DISCONTINUED | OUTPATIENT
Start: 2023-05-19 | End: 2023-05-19

## 2023-05-19 RX ORDER — MORPHINE SULFATE 10 MG/ML
6 INJECTION, SOLUTION INTRAMUSCULAR; INTRAVENOUS EVERY 10 MIN PRN
Status: DISCONTINUED | OUTPATIENT
Start: 2023-05-19 | End: 2023-05-19 | Stop reason: HOSPADM

## 2023-05-19 RX ORDER — SODIUM CHLORIDE 9 MG/ML
INJECTION, SOLUTION INTRAVENOUS CONTINUOUS PRN
Status: DISCONTINUED | OUTPATIENT
Start: 2023-05-19 | End: 2023-05-19 | Stop reason: SURG

## 2023-05-19 RX ORDER — FLUTICASONE FUROATE AND VILANTEROL 200; 25 UG/1; UG/1
1 POWDER RESPIRATORY (INHALATION) DAILY
Status: DISCONTINUED | OUTPATIENT
Start: 2023-05-19 | End: 2023-05-21

## 2023-05-19 RX ORDER — METOCLOPRAMIDE HYDROCHLORIDE 5 MG/ML
10 INJECTION INTRAMUSCULAR; INTRAVENOUS EVERY 8 HOURS PRN
Status: DISCONTINUED | OUTPATIENT
Start: 2023-05-19 | End: 2023-05-19 | Stop reason: HOSPADM

## 2023-05-19 RX ORDER — ONDANSETRON 2 MG/ML
4 INJECTION INTRAMUSCULAR; INTRAVENOUS EVERY 6 HOURS PRN
Status: DISCONTINUED | OUTPATIENT
Start: 2023-05-19 | End: 2023-05-19 | Stop reason: HOSPADM

## 2023-05-19 RX ORDER — DEXAMETHASONE SODIUM PHOSPHATE 4 MG/ML
VIAL (ML) INJECTION AS NEEDED
Status: DISCONTINUED | OUTPATIENT
Start: 2023-05-19 | End: 2023-05-19 | Stop reason: SURG

## 2023-05-19 RX ORDER — MORPHINE SULFATE 4 MG/ML
4 INJECTION, SOLUTION INTRAMUSCULAR; INTRAVENOUS EVERY 10 MIN PRN
Status: DISCONTINUED | OUTPATIENT
Start: 2023-05-19 | End: 2023-05-19 | Stop reason: HOSPADM

## 2023-05-19 RX ORDER — ACETAMINOPHEN 500 MG
500 TABLET ORAL ONCE
Status: COMPLETED | OUTPATIENT
Start: 2023-05-19 | End: 2023-05-19

## 2023-05-19 RX ORDER — NALOXONE HYDROCHLORIDE 0.4 MG/ML
80 INJECTION, SOLUTION INTRAMUSCULAR; INTRAVENOUS; SUBCUTANEOUS AS NEEDED
Status: ACTIVE | OUTPATIENT
Start: 2023-05-19 | End: 2023-05-19

## 2023-05-19 RX ORDER — HYDROMORPHONE HYDROCHLORIDE 1 MG/ML
0.2 INJECTION, SOLUTION INTRAMUSCULAR; INTRAVENOUS; SUBCUTANEOUS EVERY 5 MIN PRN
Status: DISCONTINUED | OUTPATIENT
Start: 2023-05-19 | End: 2023-05-19 | Stop reason: HOSPADM

## 2023-05-19 RX ORDER — CEFTRIAXONE 1 G/1
1 INJECTION, POWDER, FOR SOLUTION INTRAMUSCULAR; INTRAVENOUS
Status: COMPLETED | OUTPATIENT
Start: 2023-05-20 | End: 2023-05-19

## 2023-05-19 RX ORDER — IBUPROFEN 600 MG/1
600 TABLET ORAL ONCE
Status: COMPLETED | OUTPATIENT
Start: 2023-05-19 | End: 2023-05-19

## 2023-05-19 RX ORDER — QUETIAPINE FUMARATE 25 MG/1
50 TABLET, FILM COATED ORAL NIGHTLY
Status: DISCONTINUED | OUTPATIENT
Start: 2023-05-19 | End: 2023-05-21

## 2023-05-19 RX ORDER — IPRATROPIUM BROMIDE AND ALBUTEROL SULFATE 2.5; .5 MG/3ML; MG/3ML
3 SOLUTION RESPIRATORY (INHALATION)
Status: DISCONTINUED | OUTPATIENT
Start: 2023-05-19 | End: 2023-05-19

## 2023-05-19 RX ORDER — HYDROMORPHONE HYDROCHLORIDE 1 MG/ML
0.6 INJECTION, SOLUTION INTRAMUSCULAR; INTRAVENOUS; SUBCUTANEOUS EVERY 5 MIN PRN
Status: DISCONTINUED | OUTPATIENT
Start: 2023-05-19 | End: 2023-05-19 | Stop reason: HOSPADM

## 2023-05-19 RX ORDER — PHENYLEPHRINE HCL 10 MG/ML
VIAL (ML) INJECTION AS NEEDED
Status: DISCONTINUED | OUTPATIENT
Start: 2023-05-19 | End: 2023-05-19 | Stop reason: SURG

## 2023-05-19 RX ORDER — ONDANSETRON 2 MG/ML
INJECTION INTRAMUSCULAR; INTRAVENOUS AS NEEDED
Status: DISCONTINUED | OUTPATIENT
Start: 2023-05-19 | End: 2023-05-19 | Stop reason: SURG

## 2023-05-19 RX ORDER — CLOPIDOGREL BISULFATE 75 MG/1
75 TABLET ORAL DAILY
Status: DISCONTINUED | OUTPATIENT
Start: 2023-05-19 | End: 2023-05-21

## 2023-05-19 RX ORDER — ATORVASTATIN CALCIUM 40 MG/1
40 TABLET, FILM COATED ORAL DAILY
Status: DISCONTINUED | OUTPATIENT
Start: 2023-05-19 | End: 2023-05-21

## 2023-05-19 RX ORDER — MAGNESIUM HYDROXIDE 1200 MG/15ML
LIQUID ORAL CONTINUOUS PRN
Status: COMPLETED | OUTPATIENT
Start: 2023-05-19 | End: 2023-05-19

## 2023-05-19 RX ORDER — SODIUM CHLORIDE, SODIUM LACTATE, POTASSIUM CHLORIDE, CALCIUM CHLORIDE 600; 310; 30; 20 MG/100ML; MG/100ML; MG/100ML; MG/100ML
INJECTION, SOLUTION INTRAVENOUS CONTINUOUS
Status: DISCONTINUED | OUTPATIENT
Start: 2023-05-19 | End: 2023-05-21

## 2023-05-19 RX ORDER — MORPHINE SULFATE 4 MG/ML
2 INJECTION, SOLUTION INTRAMUSCULAR; INTRAVENOUS EVERY 10 MIN PRN
Status: DISCONTINUED | OUTPATIENT
Start: 2023-05-19 | End: 2023-05-19 | Stop reason: HOSPADM

## 2023-05-19 RX ORDER — IPRATROPIUM BROMIDE AND ALBUTEROL SULFATE 2.5; .5 MG/3ML; MG/3ML
3 SOLUTION RESPIRATORY (INHALATION) ONCE
Status: DISCONTINUED | OUTPATIENT
Start: 2023-05-19 | End: 2023-05-19

## 2023-05-19 RX ORDER — ACETAMINOPHEN 325 MG/1
325 TABLET ORAL EVERY 6 HOURS PRN
Status: DISCONTINUED | OUTPATIENT
Start: 2023-05-19 | End: 2023-05-21

## 2023-05-19 RX ORDER — ACETAMINOPHEN 500 MG
1000 TABLET ORAL ONCE
Status: COMPLETED | OUTPATIENT
Start: 2023-05-19 | End: 2023-05-19

## 2023-05-19 RX ORDER — CILOSTAZOL 100 MG/1
100 TABLET ORAL 2 TIMES DAILY
Status: DISCONTINUED | OUTPATIENT
Start: 2023-05-19 | End: 2023-05-21

## 2023-05-19 RX ORDER — IPRATROPIUM BROMIDE AND ALBUTEROL SULFATE 2.5; .5 MG/3ML; MG/3ML
3 SOLUTION RESPIRATORY (INHALATION)
Status: DISCONTINUED | OUTPATIENT
Start: 2023-05-20 | End: 2023-05-20

## 2023-05-19 RX ORDER — HYDROMORPHONE HYDROCHLORIDE 1 MG/ML
0.4 INJECTION, SOLUTION INTRAMUSCULAR; INTRAVENOUS; SUBCUTANEOUS EVERY 5 MIN PRN
Status: DISCONTINUED | OUTPATIENT
Start: 2023-05-19 | End: 2023-05-19 | Stop reason: HOSPADM

## 2023-05-19 RX ORDER — FUROSEMIDE 40 MG/1
40 TABLET ORAL DAILY
Status: DISCONTINUED | OUTPATIENT
Start: 2023-05-20 | End: 2023-05-21

## 2023-05-19 RX ORDER — IPRATROPIUM BROMIDE AND ALBUTEROL SULFATE 2.5; .5 MG/3ML; MG/3ML
3 SOLUTION RESPIRATORY (INHALATION) EVERY 6 HOURS PRN
Status: DISCONTINUED | OUTPATIENT
Start: 2023-05-19 | End: 2023-05-19

## 2023-05-19 RX ORDER — EPHEDRINE SULFATE 50 MG/ML
INJECTION INTRAVENOUS AS NEEDED
Status: DISCONTINUED | OUTPATIENT
Start: 2023-05-19 | End: 2023-05-19 | Stop reason: SURG

## 2023-05-19 RX ADMIN — SODIUM CHLORIDE: 9 INJECTION, SOLUTION INTRAVENOUS at 07:46:00

## 2023-05-19 RX ADMIN — EPHEDRINE SULFATE 5 MG: 50 INJECTION INTRAVENOUS at 08:15:00

## 2023-05-19 RX ADMIN — SODIUM CHLORIDE: 9 INJECTION, SOLUTION INTRAVENOUS at 08:58:00

## 2023-05-19 RX ADMIN — PHENYLEPHRINE HCL 100 MCG: 10 MG/ML VIAL (ML) INJECTION at 08:15:00

## 2023-05-19 RX ADMIN — PHENYLEPHRINE HCL 200 MCG: 10 MG/ML VIAL (ML) INJECTION at 08:05:00

## 2023-05-19 RX ADMIN — ONDANSETRON 4 MG: 2 INJECTION INTRAMUSCULAR; INTRAVENOUS at 07:54:00

## 2023-05-19 RX ADMIN — EPHEDRINE SULFATE 10 MG: 50 INJECTION INTRAVENOUS at 08:01:00

## 2023-05-19 RX ADMIN — DEXAMETHASONE SODIUM PHOSPHATE 4 MG: 4 MG/ML VIAL (ML) INJECTION at 07:54:00

## 2023-05-19 RX ADMIN — LIDOCAINE HYDROCHLORIDE 50 MG: 10 INJECTION, SOLUTION EPIDURAL; INFILTRATION; INTRACAUDAL; PERINEURAL at 07:54:00

## 2023-05-19 RX ADMIN — CEFTRIAXONE 1 G: 1 INJECTION, POWDER, FOR SOLUTION INTRAMUSCULAR; INTRAVENOUS at 07:56:00

## 2023-05-19 NOTE — OPERATIVE REPORT
[facility]     PATIENT'S NAME: Marino Henry   ATTENDING PHYSICIAN: Sergio Fuller MD   OPERATING PHYSICIAN: Sergio Fuller MD     MRN: [id number]  : 1934  DATE OF OPERATION: 2023    OPERATIVE REPORT        PREOPERATIVE DIAGNOSIS:  Right ureteral and renal stones    POSTOPERATIVE DIAGNOSIS:  Same    PROCEDURE PERFORMED:  Cystoscopy, right retrograde pyelogram x 2 (upper and lower moieties), right ureteroscopy x 2 (upper and lower moieties), laser lithotripsy, stone extraction, right ureteral stent insertion (upper and lower moieties)     ANESTHESIA:  General     ESTIMATED BLOOD LOSS:  Minimal     INTRAVENOUS FLUIDS:  See anesthesia record. URINE OUTPUT:  Not measured     COMPLICATIONS:  None     DRAINS:  Right 6 Fr 22 cm ureteral stent in lower moiety, 6 Fr x 26 cm ureteral stent in upper moiety     SPECIMENS:  Right renal stones for chemical analysis     DISPOSITION:  Stable to recovery room. INDICATIONS:  The patient is a 80year old-year-old female who was found to have right ureteral and renal stones on CT performed for other indications. We discussed options for management of the stone including conservative approach versus ureteroscopy. Patient elected to proceed with ureteroscopic stone extraction. We discussed risks including bleeding, infection, damage surrounding structures, ureteral injury, ureteral stricture formation, inability to remove all stones, need for ureteral stent and stent related complications, need for additional procedures, and others. All questions were answered. FINDINGS:   Normal cystourethroscopy. Some white debris within the bladder upon entry. 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 Lower moiety with no renal or ureteral stones. Upper moiety with multiple ureteral and renal stones. PROCEDURE:  Informed consent was obtained.  The patient was brought to the operative suite where general anesthesia was administered. IV antibiotics were administered and SCDs were applied to the left lower extremity only given a large bruise on the right lower extremity. She was placed in the dorsolithotomy position, prepped, and draped in usual fashion. After timeout was completed, the rigid cystoscope was inserted per urethra and advanced into the bladder under direct vision. The bladder was thoroughly inspected and no mucosal abnormalities were seen. There was some scant debris which was irrigated out. Of note 2 separate right ureteral orifice ease were seen consistent with a complete duplication of the collecting system. I started with the lower pole moiety. The lower pole ureteral orifice was cannulated with a 5 [place] Karma open-ended ureteral catheter which was advanced into the distal ureter over a wire. Gentle retrograde pyelogram was performed which revealed no apparent filling defects. A sensor wire was advanced into the collecting system. A semirigid ureteroscope was inserted per urethra and carefully navigated into the distal ureter. A stone had been seen on the scan in this location. The rigid ureteroscope was advanced as far proximally as possible, no ureteral stones were seen. Next a dual-lumen was used to place a second wire as a safety. The flexible ureteroscope was inserted over one of the wires and advanced into the renal pelvis without resistance. Complete nephroscopy was performed. No renal stones were seen. The ureteroscope was then slowly backed down the ureter inspecting its entire length. Again no ureteral stones were seen. A 6 Georgian by 22 cm ureteral stent was then placed over the safety wire under fluoroscopic guidance. Upon removal of the wire, the proximal and distal curls of the stent were seen in appropriate position. Attention was then turned to the upper pole.   The ureteral orifice was cannulated with a 5 Georgian ureteral catheter which was inserted in order to perform a retrograde pyelogram.  There was mild hydronephrosis. A wire was advanced. A dual-lumen ureteral catheter was used to place a second wire. Flexible ureteroscope was then inserted over one of the wires into the mid ureter where resistance was met due to several ureteral stones in this location. The 200 nm laser fiber was inserted through the scope and used to fragment the stones completely. The stones fragmented very readily and were dusted. With the ureter clear the ureteroscope was then advanced further proximally into the renal pelvis. Complete nephroscopy was performed. There were multiple renal stones seen. These were dusted completely. Several fragments were removed using the 1.9 Lithuanian 0 tip basket to send for analysis. Once the stone was finally dusted, final nephroscopy was performed. There were no clinically significant stone fragments remaining. The ureteroscope was then slowly backed in the ureter, inspecting its entire length. There were no ureteral injuries seen and no remaining ureteral stones. A 6 Lithuanian by 26 cm ureteral stent was then placed under fluoroscopic guidance over the safety wire. Upon removal of the wire, the proximal and distal curls of the stent were seen in appropriate position. The bladder was then emptied. The patient was returned to the supine position and awakened. She was taken the recovery room in stable condition having tolerated the procedure well.     PLAN  Stent removal in the office in one week

## 2023-05-19 NOTE — PROGRESS NOTES
PT had cough this morning before surgery. Called anesthesia to request neb treatment. Ordered and administered by respiratory. When assessing pt skin during admission. Red/painful spot on RLL. MD notified and came to bed side. No new ordered, surgery continued as scheduled.

## 2023-05-20 LAB
ANION GAP SERPL CALC-SCNC: 4 MMOL/L (ref 0–18)
BASOPHILS # BLD AUTO: 0.04 X10(3) UL (ref 0–0.2)
BASOPHILS NFR BLD AUTO: 0.2 %
BUN BLD-MCNC: 21 MG/DL (ref 7–18)
BUN/CREAT SERPL: 16.5 (ref 10–20)
CALCIUM BLD-MCNC: 8.5 MG/DL (ref 8.5–10.1)
CHLORIDE SERPL-SCNC: 110 MMOL/L (ref 98–112)
CO2 SERPL-SCNC: 27 MMOL/L (ref 21–32)
CREAT BLD-MCNC: 1.27 MG/DL
DEPRECATED RDW RBC AUTO: 51.9 FL (ref 35.1–46.3)
EOSINOPHIL # BLD AUTO: 0.26 X10(3) UL (ref 0–0.7)
EOSINOPHIL NFR BLD AUTO: 1.3 %
ERYTHROCYTE [DISTWIDTH] IN BLOOD BY AUTOMATED COUNT: 14.5 % (ref 11–15)
GFR SERPLBLD BASED ON 1.73 SQ M-ARVRAT: 41 ML/MIN/1.73M2 (ref 60–?)
GLUCOSE BLD-MCNC: 88 MG/DL (ref 70–99)
HCT VFR BLD AUTO: 36.3 %
HGB BLD-MCNC: 11.7 G/DL
IMM GRANULOCYTES # BLD AUTO: 0.21 X10(3) UL (ref 0–1)
IMM GRANULOCYTES NFR BLD: 1 %
LYMPHOCYTES # BLD AUTO: 0.67 X10(3) UL (ref 1–4)
LYMPHOCYTES NFR BLD AUTO: 3.2 %
MCH RBC QN AUTO: 31.2 PG (ref 26–34)
MCHC RBC AUTO-ENTMCNC: 32.2 G/DL (ref 31–37)
MCV RBC AUTO: 96.8 FL
MONOCYTES # BLD AUTO: 0.88 X10(3) UL (ref 0.1–1)
MONOCYTES NFR BLD AUTO: 4.2 %
NEUTROPHILS # BLD AUTO: 18.67 X10 (3) UL (ref 1.5–7.7)
NEUTROPHILS # BLD AUTO: 18.67 X10(3) UL (ref 1.5–7.7)
NEUTROPHILS NFR BLD AUTO: 90.1 %
OSMOLALITY SERPL CALC.SUM OF ELEC: 294 MOSM/KG (ref 275–295)
PLATELET # BLD AUTO: 122 10(3)UL (ref 150–450)
POTASSIUM SERPL-SCNC: 4.2 MMOL/L (ref 3.5–5.1)
RBC # BLD AUTO: 3.75 X10(6)UL
SODIUM SERPL-SCNC: 141 MMOL/L (ref 136–145)
WBC # BLD AUTO: 20.7 X10(3) UL (ref 4–11)

## 2023-05-20 PROCEDURE — 99232 SBSQ HOSP IP/OBS MODERATE 35: CPT | Performed by: INTERNAL MEDICINE

## 2023-05-20 RX ORDER — METHYLPREDNISOLONE SODIUM SUCCINATE 40 MG/ML
40 INJECTION, POWDER, LYOPHILIZED, FOR SOLUTION INTRAMUSCULAR; INTRAVENOUS EVERY 12 HOURS
Status: DISCONTINUED | OUTPATIENT
Start: 2023-05-20 | End: 2023-05-21

## 2023-05-20 RX ORDER — HEPARIN SODIUM 5000 [USP'U]/ML
5000 INJECTION, SOLUTION INTRAVENOUS; SUBCUTANEOUS EVERY 12 HOURS SCHEDULED
Status: DISCONTINUED | OUTPATIENT
Start: 2023-05-20 | End: 2023-05-21

## 2023-05-20 RX ORDER — IPRATROPIUM BROMIDE AND ALBUTEROL SULFATE 2.5; .5 MG/3ML; MG/3ML
3 SOLUTION RESPIRATORY (INHALATION)
Status: DISCONTINUED | OUTPATIENT
Start: 2023-05-20 | End: 2023-05-21

## 2023-05-20 NOTE — PROGRESS NOTES
Pt sent from post op for monitoring O2. On 1-2L NC. Per MD pt at 94% on RA for baseline. A&Ox1-2, hx dementia. Mild pain to abdomen. Purewick in place. No BM. IV fluids. Cardiac diet. Up with assist and walker per son, Liyah Roland. Wheel chair for long distances. Plan to discharge tomorrow and stent removal in 1 week. Care endorsed to night RN. Safety measures in place.

## 2023-05-21 VITALS
DIASTOLIC BLOOD PRESSURE: 60 MMHG | SYSTOLIC BLOOD PRESSURE: 101 MMHG | HEIGHT: 62 IN | OXYGEN SATURATION: 93 % | HEART RATE: 93 BPM | BODY MASS INDEX: 27.6 KG/M2 | RESPIRATION RATE: 18 BRPM | WEIGHT: 150 LBS | TEMPERATURE: 98 F

## 2023-05-21 LAB
ANION GAP SERPL CALC-SCNC: 6 MMOL/L (ref 0–18)
BUN BLD-MCNC: 28 MG/DL (ref 7–18)
BUN/CREAT SERPL: 25.5 (ref 10–20)
CALCIUM BLD-MCNC: 8.7 MG/DL (ref 8.5–10.1)
CHLORIDE SERPL-SCNC: 112 MMOL/L (ref 98–112)
CO2 SERPL-SCNC: 22 MMOL/L (ref 21–32)
CREAT BLD-MCNC: 1.1 MG/DL
GFR SERPLBLD BASED ON 1.73 SQ M-ARVRAT: 48 ML/MIN/1.73M2 (ref 60–?)
GLUCOSE BLD-MCNC: 115 MG/DL (ref 70–99)
OSMOLALITY SERPL CALC.SUM OF ELEC: 296 MOSM/KG (ref 275–295)
POTASSIUM SERPL-SCNC: 4.4 MMOL/L (ref 3.5–5.1)
SODIUM SERPL-SCNC: 140 MMOL/L (ref 136–145)

## 2023-05-21 PROCEDURE — 99239 HOSP IP/OBS DSCHRG MGMT >30: CPT | Performed by: INTERNAL MEDICINE

## 2023-05-21 RX ORDER — PREDNISONE 20 MG/1
40 TABLET ORAL DAILY
Qty: 6 TABLET | Refills: 0 | Status: SHIPPED | COMMUNITY
Start: 2023-05-22

## 2023-05-21 RX ORDER — IPRATROPIUM BROMIDE AND ALBUTEROL SULFATE 2.5; .5 MG/3ML; MG/3ML
3 SOLUTION RESPIRATORY (INHALATION) EVERY 4 HOURS PRN
Status: DISCONTINUED | OUTPATIENT
Start: 2023-05-21 | End: 2023-05-21

## 2023-05-21 NOTE — PLAN OF CARE
Patient is alert X1-2. Vital signs stable. Complained right leg pain improved with tylenol. IV fluids infusing. IV Solumedrol continued. Oxygen at 2L in place, unable to wean off oxygen, patient's O2 saturation drops to 88-89% on room air. Purewick in place- dark red colored urine overnight. Call light within reach. Fall and safety measures in place.

## 2023-05-21 NOTE — CM/SW NOTE
05/21/23 1217   Discharge disposition   Expected discharge disposition Home or [facility] Provider Other  ([facility])   Discharge transportation [address] [place] [address] for patient discharge to [facility] today via [phone] at 3pm, family agreeable to Marley Spoon. PCS form completed. CM called facility at [phone] with update on patient discharge. RN updated on dc plan and provided facility phone number for transfer report. [facility]  [address] [place] [address], [address] [place] [address]    Nicolle Napoles, DANIELLE Case Manager [id number]

## 2023-05-21 NOTE — PROGRESS NOTES
Patient alert and oriented X 1-2, hx dementia. Oxygen mid to upper 90s on room air. Continues with IVFs and IV solumedrol. Purewick in place. Urine dark neeraj to dark red. Tolerating diet. Patient cleared for discharge. Report called to [facility]. Son notified of discharge. Patient transported via [facility]. All belongings with patient.

## 2023-05-21 NOTE — PROGRESS NOTES
Patient alert and oriented X 1-2, hx dementia. Continues with IVFs and oxygen via NC. Tried to wean patient off O2 but was unsuccessful. Started on solumedrol. IVFs decreased. Purewick in place. Urine dark neeraj to dark red. Tolerating diet. Plans for discharge hopefully tomorrow. Bed locked and in lowest position call light within reach, bed alarm for added safety.

## 2023-05-22 ENCOUNTER — PATIENT OUTREACH (OUTPATIENT)
Dept: CASE MANAGEMENT | Age: 88
End: 2023-05-22

## 2023-05-25 LAB
CARBONATE APATITE: 20 %
MG NH4 PO4 (STRUVITE): 80 %
WEIGHT-STONE: 1 MG

## 2023-07-19 NOTE — TELEPHONE ENCOUNTER
Called son per hipaa and relayed DR. RAUSCH message -verbalized understanding
I am aware that her surgery was cancelled-her memory is not very good. She was to stop aspirin 1 week before surgery and did not do this. She should ask her son as he is aware.
LMTCB
LMTCB.
Pt's surgery for tomorrow with Dr John Sofia @ [facility] was cancelled  Pt received a voice mail yesterday her surgery was cancelled - was not able to speak with anyone in Dr Sarah Lara office for details as to why    Wanted call back from Dr Roopa Pate to
To -  Tried calling patient on 2/8 and again today- her surgery was cancelled with DR. Mari Gardner and on 2/8 she did not know why . Do you have any information ?
No

## 2023-08-29 ENCOUNTER — TELEPHONE (OUTPATIENT)
Dept: INTERNAL MEDICINE CLINIC | Facility: CLINIC | Age: 88
End: 2023-08-29

## 2024-03-10 NOTE — ANESTHESIA PROCEDURE NOTES
Airway  Date/Time: 5/19/2023 7:55 AM  Urgency: Elective    Airway not difficult    General Information and Staff    Patient location during procedure: OR  Anesthesiologist: Nubia Carver MD  Performed: anesthesiologist   Performed by: Nubia Carver MD  Authorized by: Nubia Carver MD      Indications and Patient Condition  Indications for airway management: anesthesia  Spontaneous ventilation: present  Sedation level: deep  Preoxygenated: yes  Patient position: sniffing  Mask difficulty assessment: 1 - vent by mask    Final Airway Details  Final airway type: supraglottic airway      Successful airway: classic  Size 4  Airway Seal Pressure (cm H2O): 30       Number of attempts at approach: 1  Number of other approaches attempted: 0
General

## (undated) DIAGNOSIS — R35.0 URINARY FREQUENCY: ICD-10-CM

## (undated) DIAGNOSIS — R82.90 ABNORMAL URINE ODOR: Primary | ICD-10-CM

## (undated) DEVICE — SLEEVE KENDALL SCD EXPRESS MED

## (undated) DEVICE — HYBRID SENSOR WIRE .035 STR

## (undated) DEVICE — DUAL LUMEN URETERAL CATHETER

## (undated) DEVICE — HYDROGEL COATED URETERAL DILATOR: Brand: NOTTINGHAM ONE-STEP

## (undated) DEVICE — STERILE WATER 1000ML BTL

## (undated) DEVICE — PAD,EYE,LARGE,2 1/8"X2 5/8",STERILE,LF: Brand: MEDLINE

## (undated) DEVICE — MOSES 200 FIBER

## (undated) DEVICE — MEDI-VAC NON-CONDUCTIVE SUCTION TUBING: Brand: CARDINAL HEALTH

## (undated) DEVICE — UROLOGY DRAIN BAG

## (undated) DEVICE — SOL NACL IRRIG 0.9% 1000ML BTL

## (undated) DEVICE — SOLO FLEX HYBRID GUIDEWIRE .03

## (undated) DEVICE — VIAL ISOVUE 300 10X100ML

## (undated) DEVICE — CATHETER,URETHRAL,REDRUBBER,STRL,12FR: Brand: MEDLINE INDUSTRIES, INC.

## (undated) DEVICE — SNAP KOVER: Brand: UNBRANDED

## (undated) DEVICE — GAMMEX® PI HYBRID SIZE 6, STERILE POWDER-FREE SURGICAL GLOVE, POLYISOPRENE AND NEOPRENE BLEND: Brand: GAMMEX

## (undated) DEVICE — ZIPWIRE GUIDEWIRE 035X150 STR

## (undated) DEVICE — ENDOSCOPIC VALVE WITH ADAPTER.: Brand: SURSEAL® II

## (undated) DEVICE — SKIN PREP TRAY 4 COMPARTM TRAY: Brand: MEDLINE INDUSTRIES, INC.

## (undated) DEVICE — SOLUTION  .9 3000ML

## (undated) DEVICE — NITINOL STONE RETRIEVAL BASKET: Brand: ZERO TIP

## (undated) DEVICE — TIGERTAIL 5F FLXTIP 70CM

## (undated) DEVICE — CYSTO PACK: Brand: MEDLINE INDUSTRIES, INC.

## (undated) NOTE — IP AVS SNAPSHOT
[facility]            (For Outpatient Use Only) Initial Admit Date: 5/5/2020   Inpt/Obs Admit Date: Inpt: 5/5/20 / Obs: N/A   Discharge Date:    [place] Later:  [de-identified]   MRN: [de-identified]   CSN: [id number]   CEID: [id number]        [facility] Group Number:  Insurance Type:    Subscriber Name:  Subscriber :    Subscriber ID:  Pt Rel to Subscriber:    Hospital Account Financial Class: Medicare    May 20, 2020

## (undated) NOTE — IP AVS SNAPSHOT
Patient Demographics     Address  [address] [address] Phone  [phone] [facility]) *Preferred*  [phone] [facility])      Emergency Contact(s)     Name Relation Home Work Wiggins Son [phone] Commonly known as:  LEXAPRO  Next dose due:  TOMORROW MORNING      Take 1 tablet (5 mg total) by mouth daily.    Romi Quintanilla DO         ezetimibe 10 MG Tabs  Commonly known as:  Zetia  Next dose due:  TOMORROW MORNING      Take 1 tablet (10 mg total) by elvira Pantoprazole Sodium 40 MG Tbec  sodium chloride 0.9% SOLN 100 mL with Acyclovir Sodium 50 MG/ML SOLN 627 mg  vancomycin HCl 50 MG/ML Solr           [id number] - MAR ACTION REPORT  (last 24 hrs)    ** SITE UNKNOWN **     Order ID Medication Name Action Time Microbiology Results (All)     Procedure Component Value Units Date/Time    Rapid SARS-CoV-2 by PCR STAT [[id number]]  (Normal) Collected:  05/20/20 1033    Order Status:  Completed Lab Status:  Final result Updated:  05/20/20 1053    Specimen:  Other from 1228    Specimen:  Other from CSF, lumbar puncture      AFB Smear No Acid Fast Bacilli observed on direct smear    FUNGUS CULTURE AND SMEAR Once [[id number]] Collected:  05/11/20 1433    Order Status:  Sent Lab Status:   In process Updated:  05/12/20 122 Rapid SARS-CoV-2 by PCR Not Detected      Pending Labs     Order Current Status    CBC WITH DIFFERENTIAL WITH PLATELET Collected (96/87/05 0546)    AFB CULTURE AND SMEAR Preliminary result    AFB CULTURE(P) Preliminary result    FUNGUS CULTURE AND SMEAR • COPD (chronic obstructive pulmonary disease) (HCC)    • [facility] 3/1/14    right leg   • High cholesterol    • History of blood transfusion    • Hyperlipidemia    • Incontinence    • Lumbar spinal stenosis    • Lupus (HCC)    • 2 puffs into the lungs 2 (two) times daily. alendronate 70 MG Oral Tab, Take 1 tablet (70 mg total) by mouth once a week. (Patient taking differently: Take 70 mg by mouth once a week.  Every Thursday )  ezetimibe (ZETIA) 10 MG Oral Tab, Take 1 tablet (10 m Unable to follow commands; responding to questions with \"yes\" and ok\" inappropriately. Unable to state name. Skin: Skin is warm and dry.        Results:     Lab Results   Component Value Date    WBC 10.1 05/06/2020    HGB 14.2 05/06/2020    HCT 42.9 0 Hypercholesterolemia  On ezetimibe 10 mg po qD     Osteoporosis   DEXA -2.7 T-score in July 2019; no improvement on Fosamax, now receiving prolia.      Multiple vertebral compression fracture  Status post T8 vertebroplasty in 2005.     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     OA spine  Status post L2-S1 decompressive laminectomy with bilateral foraminotomies. Dr. Monico Dahl.     Memory changes  She was evaluated by neurology as outpatient.  MRI brain shows chronic Use of Assistive Device(s): unknown     Prior Level of [place]: Unable to obtain prior social history from patient. Per previous visit in 3/2020 - pt is MOD IND with ADLs using rolling walker. Has an electric scooter for longer distances ie.  To go to RBC Morphology Normal Normal, Slide reviewed, see previous RBC morphology.     Platelet Morphology See morphology below (A) Normal    Clumped Platelets 2+ (A) (none)   SCAN SLIDE    Collection Time: 05/11/20  6:14 AM   Result Value Ref Range    Slide Revie Room Number: 417/417-A       Presenting Problem: AMS, found on floor at [facility]    Problem List  Principal Problem:    Altered mental status, unspecified altered mental status type  Active Problems:    C. difficile diarrhea    Urinary tract infection without h -   Turning over in bed (including adjusting bedclothes, sheets and blankets)?: A Lot   -   Sitting down on and standing up from a chair with arms (e.g., wheelchair, bedside commode, etc.): A Lot   -   Moving from lying on back to sitting on the side of th Goal #5 Patient to demonstrate independence with home activity/exercise instructions provided to patient in preparation for discharge. Goal #5   Current Status IN PROGRESS   Goal #6    Goal #6  Current Status[DK. 1]           Attribution Key    DK. 1 - Kur assist with her B LE's and her upper trunk to sit up. Pt sat EOB for about 10 minutes with min/CGA with her sitting balance. Pt performed sit<>stand transfers while holding onto the therapist. max A. Pt stood for about 2-3 minutes.  Pt stood to receive jacob -   Sitting down on and standing up from a chair with arms (e.g., wheelchair, bedside commode, etc.): A Lot   -   Moving from lying on back to sitting on the side of the bed?: A Lot   How much help from another person does the patient currently need. ..    - RM.1 - [facility], PTA on 5/17/2020  1:57 PM                        Occupational Therapy Notes (last 72 hours) (Notes from 5/17/2020 12:15 PM through 5/20/2020 12:15 PM)      Occupational Therapy Note signed by Phyliss Heimlich, OT at 5/19/2020  2:10 this level of impairment may benefit from acute inpatient rehab. DISCHARGE RECOMMENDATIONS  OT Discharge Recommendations: Acute rehabilitation  OT Device Recommendations: 3-in-1 commode     PLAN  OT Treatment Plan: Balance activities; Visual perceptual t Patient End of Session: Up in chair;Needs met;Call light within reach;RN aware of session/findings; All patient questions and concerns addressed; Alarm set    OT Goals:       Patient will complete toilet transfer with with min a  Max assist for sit to stand, Recommend continue Regular diet/Thin liquids, continue with 1:1 supervision due to confusion/expressive aphasia. DC swallow goals. [facility] 6/7    Patient continues with significant expressive aphasia, non-fluent.   Has moments of fluency, though this is limit guidance due to reduced coordination of acceptance.  In Progress   Goal #3 The patient will tolerate trial upgrade of pureed  consistency and THIN liquids without overt signs or symptoms of aspiration with 100 % accuracy over 1-2 session(s).     Na Goal me

## (undated) NOTE — MR AVS SNAPSHOT
[facility] [address] [address]  [phone]               Thank you for choosing us for your health care visit with Marilyn Faye DO. We are glad to serve you and happy to provide you with this summary of your visit.   Please he Fax:  [phone]       Comment:  incontinence      Referral Orders      Normal Orders This Visit    OP REFERRAL TO [address] [[id number] [address]  Order #:  [id number]         **REFERRAL REQUEST**    Your physician has referred you to a specialis Take 1,200 Units by mouth 2 (two) times daily. cilostazol 100 MG Tabs   Take 1 tablet (100 mg total) by mouth 2 (two) times daily. Commonly known as:  PLETAL           EYE VITAMINS OR   Take 1 tablet by mouth daily.            IDA Ruiz Larisa

## (undated) NOTE — ED AVS SNAPSHOT
Shannon Poe   MRN: [id number]    Department:  [facility] Emergency Department   Date of Visit:  6/4/2019           Disclosure     Insurance plans vary and the physician(s) referred by the ER may not be covered by your plan.  Please within the next three months to obtain basic health screening including reassessment of your blood pressure.     IF THERE IS ANY CHANGE OR WORSENING OF YOUR CONDITION, CALL YOUR PRIMARY CARE PHYSICIAN AT ONCE OR RETURN IMMEDIATELY TO THE EMERGENCY DEPARTMEN

## (undated) NOTE — IP AVS SNAPSHOT
Patient Demographics     Address  [address] [address] Phone  [phone] [facility]) *Preferred*  [phone] [facility])      Emergency Contact(s)     Name Relation Home Work [place] Son [phone] Next dose due: Tomorrow 9 am      Take 1 tablet (75 mg total) by mouth daily. Tiara Ceaj, DO         ezetimibe 10 MG Tabs  Commonly known as:  ZETIA  Next dose due: Tomorrow 9 am      Take 1 tablet (10 mg total) by mouth daily.    Rachelle Pelayo, DO Phone:  [phone]   predniSONE 20 MG Tabs           [id number] - MAR ACTION REPORT  (last 24 hrs)    ** SITE UNKNOWN **     Order ID Medication Name Action Time Action Reason Comments    [id number] CefTRIAXone Sodium (ROCEPHIN) 1 g in sodium chloride 0.9 Order ID Medication Name Action Time Action Reason Comments    [id number] Heparin Sodium (Porcine) 5000 UNIT/ML injection 5,000 Units 01/03/20 0838 Given              Recent Vital Signs       Most Recent Value   Vitals  126/64 Filed at 01/03/2020 0820   Pu Blood — 01/03/20 0722          Components    Component Value Reference Range Flag Lab   Magnesium 2.0 1.6 - 2.6 mg/dL [employer]            Testing Performed By     Lab - Abbreviation Name Director Address Valid Date Range    Robert [address] Lab [facility] has not been eating well for 2 days, and he noticed that she was repeating herself and her mood was different than usual. She had difficulty getting out of bed today and unable to walk.      In ED, patient was noted to have severe wheezing and tachypnea and • Lupus ([facility] [address]    • Macular degeneration    • Nodule of kidney    • Osteoporosis    • Peripheral vascular disease ([facility] [address]    • Renal disorder    • Subclavian arterial stenosis (HCC)    • Visual impairment          PSH:  Past Surgical History:   Procedure Late • Albuterol Sulfate HFA (PROAIR HFA) 108 (90 Base) MCG/ACT Inhalation Aero Soln Inhale 2 puffs into the lungs every 4 (four) hours as needed for Wheezing.  1 Inhaler 6   • hydrOXYzine HCl 25 MG Oral Tab TAKE 1 TABLET BY MOUTH 3  TIMES A DAY AS NEEDED FOR  I Intake/Output Summary (Last 24 hours) at 12/31/2019 1337  Last data filed at 12/31/2019 1137  Gross per 24 hour   Intake —   Output 250 ml   Net -250 ml     NAD  A&Ox1  Head AT/NC  Anicteric  Lung occ exp wheeze and subtle crackles bases  CV  Reg   Abd sof PHYSICAL THERAPY ASSESSMENT     Patient is a 80year old female admitted 12/31/2019 for deconditioning, COPD exacerbation.   Patient's current functional deficits include unsteady gait, increased fal risk, decreased strength, transfers, bed mobility, which COPD exacerbation (HCC)  Active Problems:    Osteoporosis    PAD (peripheral artery disease) (HCC)    Subclavian artery stenosis, left (HCC)    Respiratory alkalosis    Respiratory failure with hypoxia, unspecified chronicity (HCC)    Urinary tract infec PHYSICAL THERAPY EXAMINATION     OBJECTIVE  Precautions: Bed/chair alarm(elevated fal risk)  Fall Risk: High fall risk    WEIGHT BEARING RESTRICTION  Weight Bearing Restriction: None                PAIN ASSESSMENT  Rating: 3  Location: LLE sensitive to yinka Comment : up to chair with min A / chair alarm    Bed Mobility: Min A     Transfers: Min A with RW    Exercise/Education Provided:  Bed mobility  Body mechanics  Energy conservation  Functional activity tolerated  Gait training  Posture  Strengthening  Low Signed    :  Zen Wilhelm OT (Occupational Therapist)       OCCUPATIONAL THERAPY EVALUATION - INPATIENT     Room Number: 557/557-A  Evaluation Date: 1/2/2020  Type of Evaluation: Initial[EB.1]  Presenting Problem: COPD exacerbation[EB.2]    Physici judgement, patient is not safe to return home at this time, as she is an increased fall risk and lives alone.      DISCHARGE RECOMMENDATIONS[EB.1]  OT Discharge Recommendations: Sub-acute rehabilitation  OT Device Recommendations: Shower chair[EB.2]    PLAN Toilet and Equipment: Standard height toilet;Grab bar  Shower/Tub and Equipment: Walk-in shower;Grab bar  Other Equipment: None    Occupation/Status: retired  Hand Dominance: Right  Drives: No  Patient Regularly Uses: None[EB. 2]    Stairs in Home: 0  Use o Standardized Score (AM-PAC Scale): 40.22  CMS Modifier (G-Code): CK[EB.2]    FUNCTIONAL TRANSFER ASSESSMENT[EB.1]  Supine to Sit : Not tested  Sit to Stand: Minimum assistance[EB. 2]  Toilet Transfer: Min A  Shower Transfer: Not Tested  Chair Transfer: [facility] Pt seen sitting upright in chair and agreeable to PO trials and SLP education. No overt clinical signs of aspiration on current diet. New CXR negative. SLP to sign off. Please re-order with any changes.      Diet Recommendations - Solids: Regular  Diet Khanh be lifted. No further IP SLP services deemed necessary at this time. SLP to sign off. Goal met. FOLLOW UP  Follow Up Needed: No  Number of Visits to Meet Established Goals: 2  Session: 1 following BSSE.     Hailee Haider MA, CCC-SLP  Speech-Garry

## (undated) NOTE — LETTER
[facility], [place]  Authorization for Invasive Procedures  1.  I hereby authorize  Dr. Richa Solis , my physician and whomever may be designated as the doctor's assistant, to perform the following operation and/or procedure: Right Leg performed for the purposes of advancing medicine, science, and/or education, provided my identity is not revealed. If the procedure has been videotaped, the physician/surgeon will obtain the original videotape.  The hospital will not be responsible for stor My signature below affirms that prior to the time of the procedure, I have explained to the patient and/or her legal representative, the risks and benefits involved in the proposed treatment and any reasonable alternative to the proposed treatment.  I have

## (undated) NOTE — ED AVS SNAPSHOT
Nickolas Payan   MRN: [id number]    Department:  [facility] Emergency Department   Date of Visit:  3/15/2020           Disclosure     Insurance plans vary and the physician(s) referred by the ER may not be covered by your plan.  Please contac within the next three months to obtain basic health screening including reassessment of your blood pressure.     IF THERE IS ANY CHANGE OR WORSENING OF YOUR CONDITION, CALL YOUR PRIMARY CARE PHYSICIAN AT ONCE OR RETURN IMMEDIATELY TO THE EMERGENCY DEPARTMEN

## (undated) NOTE — ED AVS SNAPSHOT
Jennifer Stoll   MRN: [id number]    Department:  [facility] Emergency Department   Date of Visit:  12/3/2017           Disclosure     Insurance plans vary and the physician(s) referred by the ER may not be covered by your plan.  Please contac within the next three months to obtain basic health screening including reassessment of your blood pressure.     IF THERE IS ANY CHANGE OR WORSENING OF YOUR CONDITION, CALL YOUR PRIMARY CARE PHYSICIAN AT ONCE OR RETURN IMMEDIATELY TO THE EMERGENCY DEPARTMEN

## (undated) NOTE — LETTER
[address], [place]  Authorization for Invasive Procedures  1.  I hereby authorize  Dr Alexandre Dior  my physician and whomever may be designated as the doctor's assistant, to perform the following operation and/or procedure: Cardiac Cat 5. I consent to the photographing of the operations or procedures to be performed for the purposes of advancing medicine, science, and/or education, provided my identity is not revealed.  If the procedure has been videotaped, the physician/surgeon will obta __________ Time: ___________    Statement of Physician  My signature below affirms that prior to the time of the procedure, I have explained to the patient and/or her legal representative, the risks and benefits involved in the proposed treatment and any r

## (undated) NOTE — IP AVS SNAPSHOT
[facility]            (For Outpatient Use Only) Initial Admit Date: 3/18/2020   Inpt/Obs Admit Date: Inpt: 3/18/20 / Obs: 03/19/20   Discharge Date:    Paul Brantley:  [de-identified]   MRN: [de-identified]   CSN: [id number]   CEID: [id number] Subscriber ID:  Pt Rel to Subscriber:    Hospital Account Financial Class: Medicare    March 20, 2020

## (undated) NOTE — MR AVS SNAPSHOT
[facility] [address] [place] [address]  [phone]               Thank you for choosing us for your health care visit with Romi Quintanilla DO. We are glad to serve you and happy to provide you with this summary of your visit.   Please he Take 1 tablet (10 mg total) by mouth nightly. Commonly known as:  ZOCOR           Vitamin D 2000 units Caps   Take 1 capsule (2,000 Units total) by mouth daily.                    MyChart     Call the Reactivitydesk for assistance with your inactive MyChart acc

## (undated) NOTE — MR AVS SNAPSHOT
[facility] [address] [address]  [phone]               Thank you for choosing us for your health care visit with Nelli Ibanez DO. We are glad to serve you and happy to provide you with this summary of your visit.   Please he No Known Allergies                Today's Vital Signs     BP Pulse Temp Height Weight BMI    108/72 mmHg 100 97.3 °F (36.3 °C) (Oral) 5' 2\" (1.575 m) 123 lb (55.792 kg) 22.49 kg/m2         Current Medications          This list is accurate as of: 2/22/17

## (undated) NOTE — IP AVS SNAPSHOT
[address]  [address] [phone]                Discharge Summary   3/1/2017    Marc Montes [facility]           Admission Information        Provider Department    3/1/2017 Lianne Terrell DO [facility] 5sw/Se HydrOXYzine HCl 25 MG Tabs   Commonly known as:  ATARAX        Take 1 tablet (25 mg total) by mouth 3 (three) times daily as needed for Itching.     Tiara Forbes                                 simvastatin 10 MG Tabs   Commonly known as:  ZOCOR        Take [phone]            Aug 24, 2017  9:00 AM   FOLLOW UP with Diana Prince MD   [address] [facility]    [address] [facility]   [phone]              Immunization History as of 3/3/2017  Ne coverage. Patient [address] is a Federal Navigator program that can help with your Affordable Care Act coverage, as well as all types of Medicaid plans.   To get signed up and covered, please call [phone] and ask to get set up for an insuran Use:  “Thinning” of blood to prevent clotting within blood vessels, Pain relief   Most common side effects:  Bleeding, stomach upset   What to report to your healthcare team: Unusual bleeding, abdominal pain, dark, loose bowel movements           Non-Narco

## (undated) NOTE — LETTER
7/14/2021          To Whom It May Concern:    Fabiola Dey is currently under my medical care and I am writing to inform you that I believe she may be candidate for skilled nursing placement.   Given her multiple comorbidities, she requires ass

## (undated) NOTE — IP AVS SNAPSHOT
[facility]            (For Outpatient Use Only) Initial Admit Date: 12/31/2019   Inpt/Obs Admit Date: Inpt: 12/31/19 / Obs: N/A   Discharge Date:    Paul Brantley:  [de-identified]   MRN: [de-identified]   CSN: [id number]   CEID: [id number]        E Subscriber ID:  Pt Rel to Subscriber:    Hospital Account Financial Class: Medicare    January 3, 2020